# Patient Record
Sex: FEMALE | Race: WHITE | NOT HISPANIC OR LATINO
[De-identification: names, ages, dates, MRNs, and addresses within clinical notes are randomized per-mention and may not be internally consistent; named-entity substitution may affect disease eponyms.]

---

## 2017-11-28 ENCOUNTER — APPOINTMENT (OUTPATIENT)
Dept: VASCULAR SURGERY | Facility: CLINIC | Age: 82
End: 2017-11-28
Payer: MEDICARE

## 2017-11-28 PROCEDURE — 99213 OFFICE O/P EST LOW 20 MIN: CPT

## 2018-02-06 ENCOUNTER — APPOINTMENT (OUTPATIENT)
Dept: ORTHOPEDIC SURGERY | Facility: CLINIC | Age: 83
End: 2018-02-06
Payer: MEDICARE

## 2018-02-06 ENCOUNTER — CHART COPY (OUTPATIENT)
Age: 83
End: 2018-02-06

## 2018-02-06 VITALS
DIASTOLIC BLOOD PRESSURE: 68 MMHG | HEIGHT: 58 IN | BODY MASS INDEX: 25.19 KG/M2 | WEIGHT: 120 LBS | HEART RATE: 94 BPM | SYSTOLIC BLOOD PRESSURE: 158 MMHG

## 2018-02-06 DIAGNOSIS — H35.30 UNSPECIFIED MACULAR DEGENERATION: ICD-10-CM

## 2018-02-06 PROCEDURE — 73552 X-RAY EXAM OF FEMUR 2/>: CPT | Mod: RT

## 2018-02-06 PROCEDURE — 99204 OFFICE O/P NEW MOD 45 MIN: CPT

## 2018-02-06 PROCEDURE — 72100 X-RAY EXAM L-S SPINE 2/3 VWS: CPT

## 2018-02-06 PROCEDURE — 72170 X-RAY EXAM OF PELVIS: CPT

## 2018-03-06 ENCOUNTER — APPOINTMENT (OUTPATIENT)
Dept: ORTHOPEDIC SURGERY | Facility: CLINIC | Age: 83
End: 2018-03-06
Payer: MEDICARE

## 2018-03-06 PROCEDURE — 99214 OFFICE O/P EST MOD 30 MIN: CPT

## 2018-04-17 ENCOUNTER — APPOINTMENT (OUTPATIENT)
Dept: ORTHOPEDIC SURGERY | Facility: CLINIC | Age: 83
End: 2018-04-17
Payer: MEDICARE

## 2018-04-17 PROCEDURE — 99214 OFFICE O/P EST MOD 30 MIN: CPT

## 2018-05-15 ENCOUNTER — APPOINTMENT (OUTPATIENT)
Dept: ORTHOPEDIC SURGERY | Facility: CLINIC | Age: 83
End: 2018-05-15
Payer: MEDICARE

## 2018-05-15 DIAGNOSIS — M47.816 SPONDYLOSIS W/OUT MYELOPATHY OR RADICULOPATHY, LUMBAR REGION: ICD-10-CM

## 2018-05-15 DIAGNOSIS — M54.16 RADICULOPATHY, LUMBAR REGION: ICD-10-CM

## 2018-05-15 PROCEDURE — 99214 OFFICE O/P EST MOD 30 MIN: CPT

## 2018-06-29 ENCOUNTER — APPOINTMENT (OUTPATIENT)
Dept: OTOLARYNGOLOGY | Facility: CLINIC | Age: 83
End: 2018-06-29
Payer: MEDICARE

## 2018-06-29 VITALS
HEART RATE: 77 BPM | HEIGHT: 58 IN | SYSTOLIC BLOOD PRESSURE: 177 MMHG | WEIGHT: 120 LBS | BODY MASS INDEX: 25.19 KG/M2 | DIASTOLIC BLOOD PRESSURE: 72 MMHG

## 2018-06-29 DIAGNOSIS — F17.200 NICOTINE DEPENDENCE, UNSPECIFIED, UNCOMPLICATED: ICD-10-CM

## 2018-06-29 DIAGNOSIS — Z60.2 PROBLEMS RELATED TO LIVING ALONE: ICD-10-CM

## 2018-06-29 DIAGNOSIS — Z78.9 OTHER SPECIFIED HEALTH STATUS: ICD-10-CM

## 2018-06-29 DIAGNOSIS — M26.69 OTHER SPECIFIED DISORDERS OF TEMPOROMANDIBULAR JOINT: ICD-10-CM

## 2018-06-29 PROCEDURE — 92567 TYMPANOMETRY: CPT

## 2018-06-29 PROCEDURE — 92557 COMPREHENSIVE HEARING TEST: CPT

## 2018-06-29 PROCEDURE — 99214 OFFICE O/P EST MOD 30 MIN: CPT | Mod: 25

## 2018-06-29 PROCEDURE — 69210 REMOVE IMPACTED EAR WAX UNI: CPT

## 2018-06-29 SDOH — SOCIAL STABILITY - SOCIAL INSECURITY: PROBLEMS RELATED TO LIVING ALONE: Z60.2

## 2019-01-29 ENCOUNTER — APPOINTMENT (OUTPATIENT)
Dept: ORTHOPEDIC SURGERY | Facility: CLINIC | Age: 84
End: 2019-01-29
Payer: MEDICARE

## 2019-01-29 VITALS — HEART RATE: 85 BPM | DIASTOLIC BLOOD PRESSURE: 73 MMHG | SYSTOLIC BLOOD PRESSURE: 179 MMHG

## 2019-01-29 DIAGNOSIS — M79.604 PAIN IN RIGHT LEG: ICD-10-CM

## 2019-01-29 PROCEDURE — 99214 OFFICE O/P EST MOD 30 MIN: CPT

## 2019-01-29 NOTE — DISCUSSION/SUMMARY
[Medication Risks Reviewed] : Medication risks reviewed [de-identified] : She will try taking 2 arthritis strength Tylenol at bedtime. I do not think these symptoms necessarily are coming from her spine. She also complains about peripheral neuropathy. I will see her for followup on a p.r.n. basis.

## 2019-01-29 NOTE — HISTORY OF PRESENT ILLNESS
[de-identified] : She again returned to the right lateral thigh pain. She has had a previous operation for an intertrochanteric hip fracture. X-rays have revealed a fracture be well healed. There is tenderness in the mid thigh it is nowhere near the trochanteric fixation. Straight leg raising is negative to 90°. I had treated her with anti-inflammatory medications that she could not tolerate. She recently had relief with some Advil gel pads but she is on Plavix and her internist does not want her to take that. She had a trial with Celebrex in the past and felt a Tylenol work just as well. Symptoms seem to be more during the night and in the day.

## 2019-01-29 NOTE — REASON FOR VISIT
[Follow-Up Visit] : a follow-up visit for [Other: _____] : [unfilled] [FreeTextEntry2] : Right lateral thigh pain

## 2019-03-19 ENCOUNTER — APPOINTMENT (OUTPATIENT)
Dept: OTOLARYNGOLOGY | Facility: CLINIC | Age: 84
End: 2019-03-19
Payer: MEDICARE

## 2019-03-19 VITALS
SYSTOLIC BLOOD PRESSURE: 179 MMHG | HEART RATE: 73 BPM | DIASTOLIC BLOOD PRESSURE: 68 MMHG | HEIGHT: 58 IN | WEIGHT: 115 LBS | BODY MASS INDEX: 24.14 KG/M2

## 2019-03-19 DIAGNOSIS — H61.23 IMPACTED CERUMEN, BILATERAL: ICD-10-CM

## 2019-03-19 DIAGNOSIS — H90.3 SENSORINEURAL HEARING LOSS, BILATERAL: ICD-10-CM

## 2019-03-19 PROCEDURE — 99214 OFFICE O/P EST MOD 30 MIN: CPT

## 2019-03-19 NOTE — ASSESSMENT
[FreeTextEntry1] : Patient with the diminished hearing hearing is are working very well on examination she is MiraLax no infection she was reassured no other options other than adjusting her hearing aids are available.

## 2019-03-19 NOTE — HISTORY OF PRESENT ILLNESS
[de-identified] : Patient states that she had her ears cleaned by the audiologist and states that she still cannot hear anything when she has had hearing aids out. SHe does not hve any pain in the ears right now or pressure and she is not having any dizziness.

## 2019-08-16 ENCOUNTER — APPOINTMENT (OUTPATIENT)
Dept: SURGICAL ONCOLOGY | Facility: CLINIC | Age: 84
End: 2019-08-16
Payer: MEDICARE

## 2019-08-16 VITALS
TEMPERATURE: 97.7 F | HEIGHT: 58 IN | DIASTOLIC BLOOD PRESSURE: 75 MMHG | OXYGEN SATURATION: 96 % | SYSTOLIC BLOOD PRESSURE: 156 MMHG | RESPIRATION RATE: 18 BRPM | HEART RATE: 81 BPM | BODY MASS INDEX: 24.14 KG/M2 | WEIGHT: 115 LBS

## 2019-08-16 DIAGNOSIS — L85.9 EPIDERMAL THICKENING, UNSPECIFIED: ICD-10-CM

## 2019-08-16 PROCEDURE — 99203 OFFICE O/P NEW LOW 30 MIN: CPT

## 2019-08-16 NOTE — HISTORY OF PRESENT ILLNESS
[de-identified] : \par 89 y/o female patient presents for an initial consultation. Pt states that she has two black spots in between her breasts. \par Pt has macular degeneration. \par \par \par Allergies: Shellfish ,Levaquin, Latex, Neosporin, Morphine, Iodine, Florescence. \par

## 2019-08-16 NOTE — CONSULT LETTER
[Dear  ___] : Dear  [unfilled], [Consult Letter:] : I had the pleasure of evaluating your patient, [unfilled]. [Consult Closing:] : Thank you very much for allowing me to participate in the care of this patient.  If you have any questions, please do not hesitate to contact me. [Please see my note below.] : Please see my note below. [Sincerely,] : Sincerely, [FreeTextEntry2] : [address] [address] [FreeTextEntry3] : Steve Atwood M.D.\par \par \par \par

## 2019-08-16 NOTE — ASSESSMENT
[FreeTextEntry1] : Imp:\par Hyperkeratosis bilateral chest walls\par No suspicious lesions\par \par Plan:\par Referred back to dermatology.

## 2019-08-16 NOTE — PHYSICAL EXAM
[Normal] : supple, no neck mass and thyroid not enlarged [Normal Neck Lymph Nodes] : normal neck lymph nodes  [Normal Supraclavicular Lymph Nodes] : normal supraclavicular lymph nodes [Normal Groin Lymph Nodes] : normal groin lymph nodes [Normal Axillary Lymph Nodes] : normal axillary lymph nodes [Normal] : oriented to person, place and time, with appropriate affect [FreeTextEntry1] : I, Jose Davila was present for the physical exam\par \par \par \par  [de-identified] : Normal S1, S2. Regular rate and rhythm\par \par  [de-identified] : Hyperkeratosis on the inner and lower outer aspect of of RT breast and inner aspect of LT breast.  [de-identified] : Clear breath sounds bilaterally, normal respiratory effort\par \par

## 2019-10-02 PROBLEM — Z60.2 PERSON LIVING ALONE: Status: ACTIVE | Noted: 2018-02-06

## 2020-03-28 ENCOUNTER — EMERGENCY (EMERGENCY)
Facility: HOSPITAL | Age: 85
LOS: 1 days | Discharge: HOME | End: 2020-03-28
Attending: EMERGENCY MEDICINE
Payer: MEDICARE

## 2020-03-28 VITALS
OXYGEN SATURATION: 97 % | HEIGHT: 62 IN | HEART RATE: 96 BPM | TEMPERATURE: 99 F | DIASTOLIC BLOOD PRESSURE: 83 MMHG | RESPIRATION RATE: 20 BRPM | SYSTOLIC BLOOD PRESSURE: 167 MMHG | WEIGHT: 104.94 LBS

## 2020-03-28 VITALS
TEMPERATURE: 98 F | DIASTOLIC BLOOD PRESSURE: 72 MMHG | SYSTOLIC BLOOD PRESSURE: 179 MMHG | RESPIRATION RATE: 18 BRPM | OXYGEN SATURATION: 98 % | HEART RATE: 98 BPM

## 2020-03-28 PROCEDURE — 73551 X-RAY EXAM OF FEMUR 1: CPT | Mod: 26,LT

## 2020-03-28 PROCEDURE — 72125 CT NECK SPINE W/O DYE: CPT | Mod: 26

## 2020-03-28 PROCEDURE — 71045 X-RAY EXAM CHEST 1 VIEW: CPT

## 2020-03-28 PROCEDURE — 70450 CT HEAD/BRAIN W/O DYE: CPT

## 2020-03-28 PROCEDURE — 72125 CT NECK SPINE W/O DYE: CPT

## 2020-03-28 PROCEDURE — 70450 CT HEAD/BRAIN W/O DYE: CPT | Mod: 26

## 2020-03-28 PROCEDURE — 99284 EMERGENCY DEPT VISIT MOD MDM: CPT

## 2020-03-28 PROCEDURE — 73551 X-RAY EXAM OF FEMUR 1: CPT

## 2020-03-28 PROCEDURE — 71045 X-RAY EXAM CHEST 1 VIEW: CPT | Mod: 26

## 2020-03-28 PROCEDURE — 73502 X-RAY EXAM HIP UNI 2-3 VIEWS: CPT

## 2020-03-28 PROCEDURE — 73502 X-RAY EXAM HIP UNI 2-3 VIEWS: CPT | Mod: 26,LT

## 2020-03-28 RX ORDER — ACETAMINOPHEN 500 MG
650 TABLET ORAL ONCE
Refills: 0 | Status: COMPLETED | OUTPATIENT
Start: 2020-03-28 | End: 2020-03-28

## 2020-03-28 RX ADMIN — Medication 650 MILLIGRAM(S): at 14:22

## 2020-03-28 RX ADMIN — Medication 650 MILLIGRAM(S): at 15:00

## 2020-03-28 NOTE — ED PROVIDER NOTE - NSFOLLOWUPINSTRUCTIONS_ED_ALL_ED_FT
1. Follow up with your primary care doctor in the next 1-2 days   2. Rest and increase fluids. Apply ice to areas of pain in 20 minute intervals several times a day  3. For pain take Tylenol 1 g every 6 hours  4. Return to ED for change of symptoms including increased pain, recurrent falls, dizziness, weakness and any concerns

## 2020-03-28 NOTE — ED ADULT NURSE NOTE - NSIMPLEMENTINTERV_GEN_ALL_ED
Implemented All Fall with Harm Risk Interventions:  [place] to call system. Call bell, personal items and telephone within reach. Instruct patient to call for assistance. Room bathroom lighting operational. Non-slip footwear when patient is off stretcher. Physically safe environment: no spills, clutter or unnecessary equipment. Stretcher in lowest position, wheels locked, appropriate side rails in place. Provide visual cue, wrist band, yellow gown, etc. Monitor gait and stability. Monitor for mental status changes and reorient to person, place, and time. Review medications for side effects contributing to fall risk. Reinforce activity limits and safety measures with patient and family. Provide visual clues: red socks.

## 2020-03-28 NOTE — ED PROVIDER NOTE - PHYSICAL EXAMINATION
CONSTITUTIONAL: Patient is awake, alert and oriented x 3. Patient is well appearing and in no acute distress.  HEAD: 2 golfball size hematomas to left head, 1st for left forehead above eyebrow w/ overlying ecchymosis w/ second superior to left ear,   EYES: PERRL b/l, EOMI,   LUNGS: CTA B/L,  HEART: RRR.+S1S2 no murmurs,   ABDOMEN: Soft nd/nt+bs no rebound or guarding.   EXTREMITY: no edema or calf tenderness b/l, FROM upper and lower ext b/l. No midline cervical, thoracic or lumbar ttp. Pelvis stable. TTP left proximal femur w/out obvious deformity. TTP left elbow w/out deformity   NEURO: No focal deficits

## 2020-03-28 NOTE — ED PROVIDER NOTE - PATIENT PORTAL LINK FT
You can access the FollowMyHealth Patient Portal offered by [facility] by registering at the following website: [email or web address]. By joining [employer]’s FollowMyHealth portal, you will also be able to view your health information using other applications (apps) compatible with our system.

## 2020-03-28 NOTE — ED PROVIDER NOTE - OBJECTIVE STATEMENT
89 year old female with pmhx GERD, arthritis, seizure disorder, macular degeneration, hear of hearing presents to ED c/o left hip and elbow pain and left sided head pain s/p fall. Pt reports she was on the phone with her son, was walking and attempted to fix something on her TV she turned to fast and lost her balance falling on her left side. She denies syncope or loc. Reports bump to left side head as well as pain in the left elbow and left upper leg. Denies HA, dizziness, fevers, chills, cough, sick contacts, chest pain, sob, abd pain, n/v/d

## 2020-03-28 NOTE — ED ADULT NURSE NOTE - OBJECTIVE STATEMENT
1115 89 yr old WF brought to ER via ambulance on stretcher from nursing home for further eval and tx of HA. "Was adjusting television that was not working and she fell. ". No LOC. Ecchymosis, swelling noted at center of forehead. fall risk precautions maintained. Denies dizziness, palp, chest pain, fever or chills. A&Ox4.

## 2020-03-28 NOTE — ED PROVIDER NOTE - PROGRESS NOTE DETAILS
Pt imaging unremarkable. Ambulated in ED w/ assistance (normally walks w/ cane). Discussed w/ son who will  patient now   Mayda Harrell PA-C

## 2020-03-28 NOTE — ED PROVIDER NOTE - PMH
Arthritis    GERD (gastroesophageal reflux disease)    Hard of hearing, bilateral    Macular degeneration    Seizure disorder    Visual impairment

## 2020-03-28 NOTE — ED PROVIDER NOTE - CLINICAL SUMMARY MEDICAL DECISION MAKING FREE TEXT BOX
89 year old female with pmhx GERD, arthritis, seizure disorder, macular degeneration, hear of hearing presents to ED c/o left hip and elbow pain and left sided head pain s/p mechanical fall. + head injury, denies loc. Pt takes Plavix. Will obtain CT head and cervical pain, xray chest, left hip and left elbow. Provide tylenol for pain and reassess -ZR 89 year old female with pmhx GERD, arthritis, seizure disorder, macular degeneration, hear of hearing presents to ED c/o left hip and elbow pain and left sided head pain s/p mechanical fall. + head injury, denies loc. Pt takes Plavix. Will obtain CT head and cervical pain, xray chest, left hip. Provide tylenol for pain and reassess -ZR 89 year old female with pmhx GERD, arthritis, seizure disorder, macular degeneration, hear of hearing presents to ED c/o left hip and elbow pain and left sided head pain s/p mechanical fall. + head injury, denies loc. Pt takes Plavix. Will obtain CT head and cervical spine, xray chest, left hip. Provide tylenol for pain and reassess -ZR

## 2021-12-12 ENCOUNTER — EMERGENCY (EMERGENCY)
Facility: HOSPITAL | Age: 86
LOS: 1 days | Discharge: HOME | End: 2021-12-12
Attending: EMERGENCY MEDICINE | Admitting: HOSPITALIST
Payer: MEDICARE

## 2021-12-12 VITALS
TEMPERATURE: 98 F | HEIGHT: 62 IN | SYSTOLIC BLOOD PRESSURE: 179 MMHG | DIASTOLIC BLOOD PRESSURE: 75 MMHG | WEIGHT: 130.07 LBS | HEART RATE: 87 BPM | OXYGEN SATURATION: 95 % | RESPIRATION RATE: 16 BRPM

## 2021-12-12 DIAGNOSIS — Z96.642 PRESENCE OF LEFT ARTIFICIAL HIP JOINT: Chronic | ICD-10-CM

## 2021-12-12 DIAGNOSIS — S32.000A WEDGE COMPRESSION FRACTURE OF UNSPECIFIED LUMBAR VERTEBRA, INITIAL ENCOUNTER FOR CLOSED FRACTURE: ICD-10-CM

## 2021-12-12 DIAGNOSIS — I10 ESSENTIAL (PRIMARY) HYPERTENSION: ICD-10-CM

## 2021-12-12 DIAGNOSIS — S39.012A STRAIN OF MUSCLE, FASCIA AND TENDON OF LOWER BACK, INITIAL ENCOUNTER: ICD-10-CM
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
## 2021-12-12 DIAGNOSIS — D32.9 BENIGN NEOPLASM OF MENINGES, UNSPECIFIED: ICD-10-CM

## 2021-12-12 LAB
ALBUMIN SERPL ELPH-MCNC: 3.8 G/DL — SIGNIFICANT CHANGE UP (ref 3.3–5)
ALP SERPL-CCNC: 123 U/L — HIGH (ref 40–120)
ALT FLD-CCNC: 8 U/L — LOW (ref 10–45)
ANION GAP SERPL CALC-SCNC: 13 MMOL/L — SIGNIFICANT CHANGE UP (ref 5–17)
APPEARANCE UR: CLEAR — SIGNIFICANT CHANGE UP
AST SERPL-CCNC: 13 U/L — SIGNIFICANT CHANGE UP (ref 10–40)
BACTERIA # UR AUTO: ABNORMAL
BASOPHILS # BLD AUTO: 0.02 K/UL — SIGNIFICANT CHANGE UP (ref 0–0.2)
BASOPHILS NFR BLD AUTO: 0.2 % — SIGNIFICANT CHANGE UP (ref 0–2)
BILIRUB SERPL-MCNC: 0.7 MG/DL — SIGNIFICANT CHANGE UP (ref 0.2–1.2)
BILIRUB UR-MCNC: NEGATIVE — SIGNIFICANT CHANGE UP
BUN SERPL-MCNC: 25 MG/DL — HIGH (ref 7–23)
CALCIUM SERPL-MCNC: 9 MG/DL — SIGNIFICANT CHANGE UP (ref 8.4–10.5)
CHLORIDE SERPL-SCNC: 100 MMOL/L — SIGNIFICANT CHANGE UP (ref 96–108)
CO2 SERPL-SCNC: 23 MMOL/L — SIGNIFICANT CHANGE UP (ref 22–31)
COLOR SPEC: SIGNIFICANT CHANGE UP
CREAT SERPL-MCNC: 0.69 MG/DL — SIGNIFICANT CHANGE UP (ref 0.5–1.3)
DIFF PNL FLD: ABNORMAL
EOSINOPHIL # BLD AUTO: 0.09 K/UL — SIGNIFICANT CHANGE UP (ref 0–0.5)
EOSINOPHIL NFR BLD AUTO: 0.8 % — SIGNIFICANT CHANGE UP (ref 0–6)
EPI CELLS # UR: 2 /HPF — SIGNIFICANT CHANGE UP
GLUCOSE SERPL-MCNC: 111 MG/DL — HIGH (ref 70–99)
GLUCOSE UR QL: NEGATIVE — SIGNIFICANT CHANGE UP
HCT VFR BLD CALC: 33.4 % — LOW (ref 34.5–45)
HGB BLD-MCNC: 10.9 G/DL — LOW (ref 11.5–15.5)
HYALINE CASTS # UR AUTO: 1 /LPF — SIGNIFICANT CHANGE UP (ref 0–7)
IMM GRANULOCYTES NFR BLD AUTO: 0.5 % — SIGNIFICANT CHANGE UP (ref 0–1.5)
KETONES UR-MCNC: NEGATIVE — SIGNIFICANT CHANGE UP
LEUKOCYTE ESTERASE UR-ACNC: ABNORMAL
LYMPHOCYTES # BLD AUTO: 1.02 K/UL — SIGNIFICANT CHANGE UP (ref 1–3.3)
LYMPHOCYTES # BLD AUTO: 9.5 % — LOW (ref 13–44)
MCHC RBC-ENTMCNC: 28.3 PG — SIGNIFICANT CHANGE UP (ref 27–34)
MCHC RBC-ENTMCNC: 32.6 GM/DL — SIGNIFICANT CHANGE UP (ref 32–36)
MCV RBC AUTO: 86.8 FL — SIGNIFICANT CHANGE UP (ref 80–100)
MONOCYTES # BLD AUTO: 0.55 K/UL — SIGNIFICANT CHANGE UP (ref 0–0.9)
MONOCYTES NFR BLD AUTO: 5.1 % — SIGNIFICANT CHANGE UP (ref 2–14)
NEUTROPHILS # BLD AUTO: 8.99 K/UL — HIGH (ref 1.8–7.4)
NEUTROPHILS NFR BLD AUTO: 83.9 % — HIGH (ref 43–77)
NITRITE UR-MCNC: NEGATIVE — SIGNIFICANT CHANGE UP
NRBC # BLD: 0 /100 WBCS — SIGNIFICANT CHANGE UP (ref 0–0)
PH UR: 6 — SIGNIFICANT CHANGE UP (ref 5–8)
PLATELET # BLD AUTO: 172 K/UL — SIGNIFICANT CHANGE UP (ref 150–400)
POTASSIUM SERPL-MCNC: 4.1 MMOL/L — SIGNIFICANT CHANGE UP (ref 3.5–5.3)
POTASSIUM SERPL-SCNC: 4.1 MMOL/L — SIGNIFICANT CHANGE UP (ref 3.5–5.3)
PROT SERPL-MCNC: 7.1 G/DL — SIGNIFICANT CHANGE UP (ref 6–8.3)
PROT UR-MCNC: ABNORMAL
RBC # BLD: 3.85 M/UL — SIGNIFICANT CHANGE UP (ref 3.8–5.2)
RBC # FLD: 14 % — SIGNIFICANT CHANGE UP (ref 10.3–14.5)
RBC CASTS # UR COMP ASSIST: 5 /HPF — HIGH (ref 0–4)
SARS-COV-2 RNA SPEC QL NAA+PROBE: SIGNIFICANT CHANGE UP
SODIUM SERPL-SCNC: 136 MMOL/L — SIGNIFICANT CHANGE UP (ref 135–145)
SP GR SPEC: 1.02 — SIGNIFICANT CHANGE UP (ref 1.01–1.02)
UROBILINOGEN FLD QL: NEGATIVE — SIGNIFICANT CHANGE UP
WBC # BLD: 10.72 K/UL — HIGH (ref 3.8–10.5)
WBC # FLD AUTO: 10.72 K/UL — HIGH (ref 3.8–10.5)
WBC UR QL: 3 /HPF — SIGNIFICANT CHANGE UP (ref 0–5)

## 2021-12-12 PROCEDURE — 93010 ELECTROCARDIOGRAM REPORT: CPT

## 2021-12-12 PROCEDURE — 99285 EMERGENCY DEPT VISIT HI MDM: CPT | Mod: 25

## 2021-12-12 RX ORDER — LIDOCAINE 4 G/100G
1 CREAM TOPICAL DAILY
Refills: 0 | Status: DISCONTINUED | OUTPATIENT
Start: 2021-12-12 | End: 2021-12-13

## 2021-12-12 RX ORDER — CHOLECALCIFEROL (VITAMIN D3) 125 MCG
1000 CAPSULE ORAL DAILY
Refills: 0 | Status: DISCONTINUED | OUTPATIENT
Start: 2021-12-12 | End: 2021-12-13

## 2021-12-12 RX ORDER — LIDOCAINE 4 G/100G
1 CREAM TOPICAL ONCE
Refills: 0 | Status: COMPLETED | OUTPATIENT
Start: 2021-12-12 | End: 2021-12-12

## 2021-12-12 RX ORDER — CLOPIDOGREL BISULFATE 75 MG/1
75 TABLET, FILM COATED ORAL DAILY
Refills: 0 | Status: DISCONTINUED | OUTPATIENT
Start: 2021-12-12 | End: 2021-12-13

## 2021-12-12 RX ORDER — ACETAMINOPHEN 500 MG
650 TABLET ORAL EVERY 6 HOURS
Refills: 0 | Status: DISCONTINUED | OUTPATIENT
Start: 2021-12-12 | End: 2021-12-13

## 2021-12-12 RX ORDER — ENOXAPARIN SODIUM 100 MG/ML
30 INJECTION SUBCUTANEOUS DAILY
Refills: 0 | Status: DISCONTINUED | OUTPATIENT
Start: 2021-12-12 | End: 2021-12-13

## 2021-12-12 RX ORDER — ACETAMINOPHEN 500 MG
650 TABLET ORAL ONCE
Refills: 0 | Status: COMPLETED | OUTPATIENT
Start: 2021-12-12 | End: 2021-12-12

## 2021-12-12 RX ADMIN — LIDOCAINE 1 PATCH: 4 CREAM TOPICAL at 10:54

## 2021-12-12 RX ADMIN — Medication 650 MILLIGRAM(S): at 10:52

## 2021-12-12 RX ADMIN — Medication 650 MILLIGRAM(S): at 16:31

## 2021-12-12 RX ADMIN — LIDOCAINE 1 PATCH: 4 CREAM TOPICAL at 22:24

## 2021-12-12 NOTE — ED PROVIDER NOTE - ATTENDING CONTRIBUTION TO CARE
------------ATTENDING NOTE------------  pt w/ son brought to ED by EMS c/o recent falls, describes this AM falling while trying to sit in chair, describes sliding back and hit back on ground, no head injury or LOC, pt was on ground for less than an hour waiting for her son to come (she called from ground), complicated as recent falls in past weeks and has likely a R sciatica that was bothering her in bed this AM, today c/o diffuse lower mid back pain w/ ROM, she is mentating at her baseline, no additional complaints, at baseline ambulates w/ walker or cane, overall very well appearing, awaiting imaging and close reassessments --> failed safe PT, needing admission for [facility] medical [facility], outpt needs assessments.  - Feng Gibbs MD   ---------------------------------------------

## 2021-12-12 NOTE — PHYSICAL THERAPY INITIAL EVALUATION ADULT - PRECAUTIONS/LIMITATIONS, REHAB EVAL
Only down for 20-30 minutes, at maximum. Pain is dull and increased with rotation. No cp, pain with deep breathing, neck pain or numbness/tingling. +L hip pain s/p hip replacement 7y ago, recently seen by Marcin and ray with soft tissue injury; no change in the pain after the fall. Only down for 20-30 minutes, at maximum. Pain is dull and increased with rotation. No cp, pain with deep breathing, neck pain or numbness/tingling. +L hip pain s/p hip replacement 7y ago, recently seen by Marcin and ray with soft tissue injury; no change in the pain after the fall. CTT/LSpine: No acute findings.Multiple chronic compression deformities of the T12, L1, L4, and L5  vertebral bodies which appear unchanged. No bony retropulsion. Diffuse osteopenia and multilevel thoracolumbar spondylosis.  Cervical spine CT: No acute fractures or dislocations.Similar-appearing multilevel cervical stenosis.Head CT: No acute intracranial findings.Similar-appearing left lateral frontal convexity partially calcified meningioma without associated mass effect or surrounding edema.Unchanged imaging findings for which normal pressure hydrocephalus can be  considered. Clinical correlation is required for this diagnosis. XRayPelvis: (-) XRayRHip: (-) XRayLTib/Fib: (-)/fall precautions

## 2021-12-12 NOTE — H&P ADULT - PROBLEM SELECTOR PLAN 1
-PT consulted and recommended for YOON and patient okay with it  -Fall risk protocol, ambulate w/ assistance

## 2021-12-12 NOTE — ED PROVIDER NOTE - OBJECTIVE STATEMENT
pt typically ambulatory at home with a walker presents after sliding off her assistive recliner and onto the floor this am, landing on her buttocks. No head trauma LOC or prodromal cp/dizziness/sob. She was in the recliner secondary to acute pain in her midback overnight. She could not find a comfortable position, so she slept the rest of the night in the recliner, and when attempting to get up in the morning before her aid arrived, slipped off the assistive recliner. Only down for 20-30 minutes, at maximum. Pain is dull and increased with rotation. No cp, pain with deep breathing, neck pain or numbness/tingling. +L hip pain s/p hip replacement 7y ago, recently seen by Marcin and dx with soft tissue injury; no change in the pain after the fall.

## 2021-12-12 NOTE — H&P ADULT - HISTORY OF PRESENT ILLNESS
89F with PMHx of macular degeneration (appears to be legally blind), severe presbycusis, and hypertension presents after a fall at home. Patient was attempting to sit on her assistive recliner when she missed it and slid onto the floor. Afterwards had worsening of her chronic back pain, bilateral knee and hip pain and was in the recliner overnight. Patient states she has trouble walking with pain in lower lumbar described as 10/10 and worsened with movement. She states she typically takes Tylenol which relieves the pain. In the ED patient essentially pan-scanned with no acute fractures, chronic of T12, L1, L4 and L5, cervical stenosis and unchanged meningioma with no midline shift.    Patient seen in the ED after working with PT and was recommended for YOON. Patient states she is turning 89 next month. She states she only takes Plavix. She states she is blind and deaf. Currently complaining of back pain, no endorsement of other alarm symptoms.

## 2021-12-12 NOTE — ED PROVIDER NOTE - CLINICAL SUMMARY MEDICAL DECISION MAKING FREE TEXT BOX
------------ATTENDING NOTE------------  pt w/ son brought to ED by EMS c/o recent falls, describes this AM falling while trying to sit in chair, describes sliding back and hit back on ground, no head injury or LOC, pt was on ground for less than an hour waiting for her son to come (she called from ground), complicated as recent falls in past weeks and has likely a R sciatica that was bothering her in bed this AM, today c/o diffuse lower mid back pain w/ ROM, she is mentating at her baseline, no additional complaints, at baseline ambulates w/ walker or cane, overall very well appearing, awaiting imaging and close reassessments -->   - Feng Gibbs MD   --------------------------------------------- see MD Note

## 2021-12-12 NOTE — ED PROVIDER NOTE - CARE PLAN
1 Principal Discharge DX:	Strain, back, initial encounter  Secondary Diagnosis:	Contusion of lower back, initial encounter

## 2021-12-12 NOTE — ED ADULT NURSE NOTE - NSIMPLEMENTINTERV_GEN_ALL_ED
Implemented All Fall with Harm Risk Interventions:  [place] to call system. Call bell, personal items and telephone within reach. Instruct patient to call for assistance. Room bathroom lighting operational. Non-slip footwear when patient is off stretcher. Physically safe environment: no spills, clutter or unnecessary equipment. Stretcher in lowest position, wheels locked, appropriate side rails in place. Provide visual cue, wrist band, yellow gown, etc. Monitor gait and stability. Monitor for mental status changes and reorient to person, place, and time. Review medications for side effects contributing to fall risk. Reinforce activity limits and safety measures with patient and family. Provide visual clues: red socks.

## 2021-12-12 NOTE — ED PROVIDER NOTE - PROGRESS NOTE DETAILS
imaging negative for acute traumatic injury, however PT has evaluated pt and deemed her unsafe to be dc'd home. sending labs for admission. hospitalist paged. PCP is Dakota Villalobos, cardiologist

## 2021-12-12 NOTE — H&P ADULT - PROBLEM SELECTOR PLAN 4
Unchanged from prior scans with no mass effect/midline shift. Patient can continue with monitor as outpatient with imaging, but based on growth trajectory and her age this is unlikely to cause her any issues.

## 2021-12-12 NOTE — H&P ADULT - PROBLEM SELECTOR PLAN 2
-Obtain TLSO brace  -Monitor for neurological alarm symptoms  -Tylenol PRN  -Lidocaine patch  -Supplement with Vitamin D and Calcium

## 2021-12-12 NOTE — ED PROVIDER NOTE - PHYSICAL EXAMINATION
General: non-toxic, NAD  HEENT: NCAT,   Cardiac: RRR, no murmurs, 2+ peripheral pulses  Resp: CTAB  Abdomen: soft, non-distended, bowel sounds present, no ttp, no rebound or guarding. no organomegaly  MSK: no midline spinal tenderness. no deformity. +tibial tenderness pelvis stable.  Skin: no rashes or ecchymosis  Neuro: AAOx4, 5+motor, sensation grossly intact  Psych: mood and affect appropriate

## 2021-12-12 NOTE — H&P ADULT - NSHPPHYSICALEXAM_GEN_ALL_CORE
T(C): 36.7 (12-12-21 @ 15:20), Max: 36.8 (12-12-21 @ 10:29)  HR: 78 (12-12-21 @ 15:20) (78 - 88)  BP: 154/69 (12-12-21 @ 15:20) (154/69 - 188/68)  RR: 18 (12-12-21 @ 10:44) (16 - 18)  SpO2: 95% (12-12-21 @ 15:20) (94% - 95%)    GEN: female in NAD, appears comfortable, no diaphoresis  EYES: No scleral injection, PERRL, EOMI  ENTM: neck supple & symmetric without tracheal deviation, moist membranes, no gross hearing impairment, thyroid gland not enlarged  CV: +S1/S2, no m/r/g, no abdominal bruit, no LE edema  RESP: breathing comfortably, no respiratory accessory muscle use, CTAB, no w/r/r  GI: normoactive BS, soft, NTND, no rebounding/guarding, no palpable masses

## 2021-12-12 NOTE — H&P ADULT - NSICDXPASTMEDICALHX_GEN_ALL_CORE_FT
PAST MEDICAL HISTORY:  Arthritis     GERD (gastroesophageal reflux disease)     Hard of hearing, bilateral     Macular degeneration     Visual impairment

## 2021-12-12 NOTE — PHYSICAL THERAPY INITIAL EVALUATION ADULT - PERTINENT HX OF CURRENT PROBLEM, REHAB EVAL
89y/o F complaining of back pain/injury. Pt typically ambulatory at home with a walker presents after sliding off her assistive recliner and onto the floor this am, landing on her buttocks.  She was in the recliner secondary to acute pain in her midback overnight. She could not find a comfortable position, so she slept the rest of the night in the recliner, and when attempting to get up in the morning before her aid arrived, slipped off the assistive recliner.

## 2021-12-12 NOTE — ED PROVIDER NOTE - NSICDXPASTMEDICALHX_GEN_ALL_CORE_FT
PAST MEDICAL HISTORY:  Arthritis     GERD (gastroesophageal reflux disease)     Hard of hearing, bilateral     Macular degeneration     Seizure disorder     Visual impairment

## 2021-12-12 NOTE — ED PROVIDER NOTE - NS ED ROS FT
Constitutional: no fevers, chills  HEENT: no HA, vision changes, rhinorrhea, sore throat  Cardiac: no chest pain, palpitations  Respiratory: no SOB, cough or hemoptysis  GI: no n/v/d/c, abd pain, bloody or dark stools  : +dysuria, no frequency, or hematuria  MSK: see HPI  Skin: no rashes, jaundice, pruritis  Neuro: no numbness/tingling, weakness, unsteady gait  ROS otherwise negative except as per HPI

## 2021-12-12 NOTE — H&P ADULT - ASSESSMENT
89F with PMHx of macular degeneration (appears to be legally blind), severe presbycusis, and hypertension presents after a fall at home after she missed her recliner and slid onto the floor onto her buttocks. In the ED patient essentially pan-scanned with no acute fractures, chronic of T12, L1, L4 and L5, cervical stenosis and unchanged meningioma with no midline shift.

## 2021-12-12 NOTE — ED ADULT NURSE NOTE - OBJECTIVE STATEMENT
89y f pt arrived via ems; emt reports son called ems; pt had gone to sit in chair, missed and slid to floor on back; pt aox3; [place]; pt denies head injury; denies LOC; pt denies dizziness/syncope; pt states just slid down; was unable to get up on own; called son; was on floor for less than an hour; pt c/o pain to right lower back; right hip; down right leg; no shortening of leg; no visible injury; pt able to move all toes; + periph pulses; pt medicated; son at bedside; safety/comfort maintained

## 2021-12-12 NOTE — PHYSICAL THERAPY INITIAL EVALUATION ADULT - GAIT TRAINING, PT EVAL
Needs an appointment. Only refilled for one month.  
Next Office Visit: none scheduled  Last seen: 11/12/20  Last refilled: 10/15/20  Medication: lamoTRIgine (LaMICtal) 100 MG tablet    She will return in 1 month for a follow up.             
Writer called pt to let her know that Lamotrigine 100 mg was sent to the pharmacy.  Pt will need to schedule an office visit with Joie Jolly prior to next refill.  Please call and schedule at [phone] opt 2  
GOAL:Pt will ambulate 50ft with RW and CG assist in 2wks.

## 2021-12-13 ENCOUNTER — TRANSCRIPTION ENCOUNTER (OUTPATIENT)
Age: 86
End: 2021-12-13

## 2021-12-13 VITALS
OXYGEN SATURATION: 94 % | SYSTOLIC BLOOD PRESSURE: 170 MMHG | DIASTOLIC BLOOD PRESSURE: 90 MMHG | HEART RATE: 83 BPM | TEMPERATURE: 98 F | RESPIRATION RATE: 18 BRPM

## 2021-12-13 PROCEDURE — 85025 COMPLETE CBC W/AUTO DIFF WBC: CPT

## 2021-12-13 PROCEDURE — 81001 URINALYSIS AUTO W/SCOPE: CPT

## 2021-12-13 PROCEDURE — 70450 CT HEAD/BRAIN W/O DYE: CPT | Mod: MA

## 2021-12-13 PROCEDURE — 80053 COMPREHEN METABOLIC PANEL: CPT

## 2021-12-13 PROCEDURE — 73502 X-RAY EXAM HIP UNI 2-3 VIEWS: CPT

## 2021-12-13 PROCEDURE — 72131 CT LUMBAR SPINE W/O DYE: CPT | Mod: MA

## 2021-12-13 PROCEDURE — U0005: CPT

## 2021-12-13 PROCEDURE — 93005 ELECTROCARDIOGRAM TRACING: CPT

## 2021-12-13 PROCEDURE — 72125 CT NECK SPINE W/O DYE: CPT | Mod: MA

## 2021-12-13 PROCEDURE — 99285 EMERGENCY DEPT VISIT HI MDM: CPT | Mod: 25

## 2021-12-13 PROCEDURE — 73590 X-RAY EXAM OF LOWER LEG: CPT

## 2021-12-13 PROCEDURE — U0003: CPT

## 2021-12-13 PROCEDURE — 97162 PT EVAL MOD COMPLEX 30 MIN: CPT

## 2021-12-13 PROCEDURE — 71250 CT THORAX DX C-: CPT | Mod: MA

## 2021-12-13 PROCEDURE — 72170 X-RAY EXAM OF PELVIS: CPT

## 2021-12-13 RX ORDER — LIDOCAINE 4 G/100G
1 CREAM TOPICAL
Qty: 0 | Refills: 0 | DISCHARGE
Start: 2021-12-13

## 2021-12-13 RX ADMIN — Medication 650 MILLIGRAM(S): at 12:07

## 2021-12-13 RX ADMIN — LIDOCAINE 1 PATCH: 4 CREAM TOPICAL at 22:35

## 2021-12-13 RX ADMIN — Medication 1000 UNIT(S): at 11:00

## 2021-12-13 RX ADMIN — Medication 650 MILLIGRAM(S): at 11:01

## 2021-12-13 RX ADMIN — LIDOCAINE 1 PATCH: 4 CREAM TOPICAL at 19:00

## 2021-12-13 RX ADMIN — CLOPIDOGREL BISULFATE 75 MILLIGRAM(S): 75 TABLET, FILM COATED ORAL at 11:00

## 2021-12-13 RX ADMIN — ENOXAPARIN SODIUM 30 MILLIGRAM(S): 100 INJECTION SUBCUTANEOUS at 11:00

## 2021-12-13 RX ADMIN — Medication 650 MILLIGRAM(S): at 00:49

## 2021-12-13 RX ADMIN — LIDOCAINE 1 PATCH: 4 CREAM TOPICAL at 11:23

## 2021-12-13 RX ADMIN — Medication 1 TABLET(S): at 11:23

## 2021-12-13 NOTE — DISCHARGE NOTE NURSING/CASE MANAGEMENT/SOCIAL WORK - PATIENT PORTAL LINK FT
You can access the FollowMyHealth Patient Portal offered by [facility] by registering at the following website: [email or web address]. By joining [facility]’s FollowMyHealth portal, you will also be able to view your health information using other applications (apps) compatible with our system.

## 2021-12-13 NOTE — PROGRESS NOTE ADULT - SUBJECTIVE AND OBJECTIVE BOX
Patient is a 89y old  Female who presents with a chief complaint of Fall   T 12- L 1- L4comp fracture (13 Dec 2021 13:45)      SUBJECTIVE / OVERNIGHT EVENTS:  Pt seen and examined with aide at bedside. No acute events overnight. She c/o right sided hip pain and low back pain.     MEDICATIONS  (STANDING):  calcium carbonate 1250 mG  + Vitamin D (OsCal 500 + D) 1 Tablet(s) Oral daily  cholecalciferol 1000 Unit(s) Oral daily  clopidogrel Tablet 75 milliGRAM(s) Oral daily  enoxaparin Injectable 30 milliGRAM(s) SubCutaneous daily  lidocaine   4% Patch 1 Patch Transdermal daily    MEDICATIONS  (PRN):  acetaminophen     Tablet .. 650 milliGRAM(s) Oral every 6 hours PRN Temp greater or equal to 38C (100.4F), Mild Pain (1 - 3), Moderate Pain (4 - 6), Severe Pain (7 - 10)      Vital Signs Last 24 Hrs  T(C): 36.1 (13 Dec 2021 10:59), Max: 36.9 (12 Dec 2021 20:16)  T(F): 97 (13 Dec 2021 10:59), Max: 98.4 (12 Dec 2021 20:16)  HR: 80 (13 Dec 2021 10:59) (69 - 82)  BP: 179/68 (13 Dec 2021 10:59) (142/60 - 179/68)  BP(mean): --  RR: 18 (13 Dec 2021 10:59) (16 - 18)  SpO2: 97% (13 Dec 2021 10:59) (93% - 97%)  CAPILLARY BLOOD GLUCOSE        I&O's Summary      PHYSICAL EXAM:  GENERAL: NAD, frail  HEAD:  Atraumatic, Normocephalic  EYES: conjunctiva and sclera clear  NECK: Supple, No JVD  CHEST/LUNG: Clear to auscultation bilaterally; No wheeze  HEART: Regular rate and rhythm; No murmurs, rubs, or gallops  ABDOMEN: Soft, Nontender, Nondistended; Bowel sounds present  EXTREMITIES:  Limited ROS due to pain, no LE edema  PSYCH: appropriate affect  NEUROLOGY:  AAOX 3, non-focal  SKIN: No rashes or lesions    LABS:                        10.9   10.72 )-----------( 172      ( 12 Dec 2021 14:54 )             33.4     12-12    136  |  100  |  25<H>  ----------------------------<  111<H>  4.1   |  23  |  0.69    Ca    9.0      12 Dec 2021 14:54    TPro  7.1  /  Alb  3.8  /  TBili  0.7  /  DBili  x   /  AST  13  /  ALT  8<L>  /  AlkPhos  123<H>            Urinalysis Basic - ( 12 Dec 2021 12:26 )    Color: Light Yellow / Appearance: Clear / S.020 / pH: x  Gluc: x / Ketone: Negative  / Bili: Negative / Urobili: Negative   Blood: x / Protein: Trace / Nitrite: Negative   Leuk Esterase: Moderate / RBC: 5 /hpf / WBC 3 /HPF   Sq Epi: x / Non Sq Epi: 2 /hpf / Bacteria: Occasional

## 2021-12-13 NOTE — DISCHARGE NOTE PROVIDER - HOSPITAL COURSE
89F with PMHx of macular degeneration (appears to be legally blind), severe presbycusis, and hypertension presents after a fall at home after she missed her recliner and slid onto the floor onto her buttocks. In the ED patient essentially pan-scanned with no acute fractures, chronic of T12, L1, L4 and L5, cervical stenosis and unchanged meningioma with no midline shift.       ·  Fall.   ·  : -PT consulted and recommended for YOON and patient okay with it  -Fall risk protocol, ambulate w/ assistance.       ·   Lumbar compression fracture.   ·   TLSO brace  -Monitor for neurological alarm symptoms  -Tylenol PRN  -Lidocaine patch  -Supplement with Vitamin D and Calcium.       ·  : Hypertension.   ·  Plan: Continue to monitor. JNC8 goal BP for someone of this age and no other comorbidities is <150/90.       ·  Meningioma.   ·  Plan: Unchanged from prior scans with no mass effect/midline shift. Patient can continue with monitor as outpatient with imaging, but based on growth trajectory and her age this is unlikely to cause her any issues.    attending has clear this patient for discharge

## 2021-12-13 NOTE — PROGRESS NOTE ADULT - NSPROGADDITIONALINFOA_GEN_ALL_CORE
Based on my assessment, this patient’s condition has improved and no longer warrants inpatient status and will be changed to outpatient status prior to being discharged from the hospital.  This decision is based on the following reasons ; stable vial signs, benign physical exam and absence of alarming neurological symptoms.    d/w VANESSA Membreno

## 2021-12-13 NOTE — PROGRESS NOTE ADULT - PROBLEM SELECTOR PLAN 1
- mechanical fall per hx ;  pt slid off a recliner , no headstrike , no LOC  - PT consulted and reccs YOON ; can be done at [facility] where she currently resides  -Fall risk protocol, ambulate w/ assistance

## 2021-12-13 NOTE — PROGRESS NOTE ADULT - PROBLEM SELECTOR PLAN 4
Unchanged from prior scans with no mass effect/midline shift.   Patient can continue with monitor as outpatient with imaging  but based on growth trajectory and her age this is unlikely to cause her any issues.

## 2021-12-13 NOTE — DISCHARGE NOTE PROVIDER - NSDCMRMEDTOKEN_GEN_ALL_CORE_FT
calcium-vitamin D 500 mg-5 mcg (200 intl units) oral tablet: 1 tab(s) orally once a day  lidocaine 4% topical film: Apply topically to affected area once a day  Plavix: 75 milligram(s) orally once a day  TLSO BRACE: 1  buccal once a day

## 2021-12-13 NOTE — DISCHARGE NOTE PROVIDER - NSDCCPCAREPLAN_GEN_ALL_CORE_FT
PRINCIPAL DISCHARGE DIAGNOSIS  Diagnosis: Strain, back, initial encounter  Assessment and Plan of Treatment: T12 FX Tlso brace - Rehab      SECONDARY DISCHARGE DIAGNOSES  Diagnosis: Contusion of lower back, initial encounter  Assessment and Plan of Treatment:

## 2021-12-13 NOTE — PROGRESS NOTE ADULT - SUBJECTIVE AND OBJECTIVE BOX
Patient eval custom measured and fitted for TLSO spinal orthosis  to treat T11 L1 L4 L% compression fractures ordered by internal medicine  delivered by [facility] [phone] spinal orthosis will aid in  immobilization of fractures and reduce pain levels allow patient to  weight bear for OBB delivered as ordered

## 2021-12-13 NOTE — DISCHARGE NOTE NURSING/CASE MANAGEMENT/SOCIAL WORK - NSDCPEFALRISK_GEN_ALL_CORE
For information on Fall & Injury Prevention, visit: [email or web address] OR  [email or web address] OR  https://www.cdc.gov/steadi/patient.html

## 2021-12-13 NOTE — PROGRESS NOTE ADULT - PROBLEM SELECTOR PLAN 2
-Obtain TLSO brace  -No neurological alarm symptoms noted  -Tylenol PRN  -Lidocaine patch  -Supplement with Vitamin D and Calcium

## 2022-12-01 ENCOUNTER — INPATIENT (INPATIENT)
Facility: HOSPITAL | Age: 87
LOS: 8 days | Discharge: NURSING, REHAB OR LONG-TERM CARE | DRG: 438 | End: 2022-12-10
Attending: INTERNAL MEDICINE | Admitting: INTERNAL MEDICINE
Payer: MEDICARE

## 2022-12-01 VITALS
WEIGHT: 119.93 LBS | HEART RATE: 107 BPM | OXYGEN SATURATION: 99 % | TEMPERATURE: 98 F | DIASTOLIC BLOOD PRESSURE: 81 MMHG | HEIGHT: 62 IN | SYSTOLIC BLOOD PRESSURE: 152 MMHG | RESPIRATION RATE: 18 BRPM

## 2022-12-01 DIAGNOSIS — K85.90 ACUTE PANCREATITIS WITHOUT NECROSIS OR INFECTION, UNSPECIFIED: ICD-10-CM

## 2022-12-01 DIAGNOSIS — Z96.642 PRESENCE OF LEFT ARTIFICIAL HIP JOINT: Chronic | ICD-10-CM

## 2022-12-01 LAB
ALBUMIN SERPL ELPH-MCNC: 3.9 G/DL — SIGNIFICANT CHANGE UP (ref 3.3–5)
ALP SERPL-CCNC: 67 U/L — SIGNIFICANT CHANGE UP (ref 40–120)
ALT FLD-CCNC: 9 U/L — LOW (ref 10–45)
ANION GAP SERPL CALC-SCNC: 17 MMOL/L — SIGNIFICANT CHANGE UP (ref 5–17)
APPEARANCE UR: ABNORMAL
AST SERPL-CCNC: 21 U/L — SIGNIFICANT CHANGE UP (ref 10–40)
BACTERIA # UR AUTO: NEGATIVE — SIGNIFICANT CHANGE UP
BASE EXCESS BLDV CALC-SCNC: -3 MMOL/L — LOW (ref -2–3)
BASOPHILS # BLD AUTO: 0.03 K/UL — SIGNIFICANT CHANGE UP (ref 0–0.2)
BASOPHILS NFR BLD AUTO: 0.2 % — SIGNIFICANT CHANGE UP (ref 0–2)
BILIRUB SERPL-MCNC: 0.8 MG/DL — SIGNIFICANT CHANGE UP (ref 0.2–1.2)
BILIRUB UR-MCNC: NEGATIVE — SIGNIFICANT CHANGE UP
BUN SERPL-MCNC: 27 MG/DL — HIGH (ref 7–23)
CA-I SERPL-SCNC: 1.25 MMOL/L — SIGNIFICANT CHANGE UP (ref 1.15–1.33)
CALCIUM SERPL-MCNC: 9.8 MG/DL — SIGNIFICANT CHANGE UP (ref 8.4–10.5)
CHLORIDE BLDV-SCNC: 102 MMOL/L — SIGNIFICANT CHANGE UP (ref 96–108)
CHLORIDE SERPL-SCNC: 100 MMOL/L — SIGNIFICANT CHANGE UP (ref 96–108)
CO2 BLDV-SCNC: 22 MMOL/L — SIGNIFICANT CHANGE UP (ref 22–26)
CO2 SERPL-SCNC: 19 MMOL/L — LOW (ref 22–31)
COLOR SPEC: YELLOW — SIGNIFICANT CHANGE UP
CREAT SERPL-MCNC: 0.91 MG/DL — SIGNIFICANT CHANGE UP (ref 0.5–1.3)
DIFF PNL FLD: ABNORMAL
EGFR: 56 ML/MIN/1.73M2 — LOW
EOSINOPHIL # BLD AUTO: 0.09 K/UL — SIGNIFICANT CHANGE UP (ref 0–0.5)
EOSINOPHIL NFR BLD AUTO: 0.6 % — SIGNIFICANT CHANGE UP (ref 0–6)
EPI CELLS # UR: 3 /HPF — SIGNIFICANT CHANGE UP
GAS PNL BLDV: 132 MMOL/L — LOW (ref 136–145)
GAS PNL BLDV: SIGNIFICANT CHANGE UP
GAS PNL BLDV: SIGNIFICANT CHANGE UP
GLUCOSE BLDV-MCNC: 61 MG/DL — LOW (ref 70–99)
GLUCOSE SERPL-MCNC: 69 MG/DL — LOW (ref 70–99)
GLUCOSE UR QL: ABNORMAL
GRAN CASTS # UR COMP ASSIST: 8 /LPF — SIGNIFICANT CHANGE UP
HCO3 BLDV-SCNC: 21 MMOL/L — LOW (ref 22–29)
HCT VFR BLD CALC: 34.8 % — SIGNIFICANT CHANGE UP (ref 34.5–45)
HCT VFR BLDA CALC: 37 % — SIGNIFICANT CHANGE UP (ref 34.5–46.5)
HGB BLD CALC-MCNC: 12.3 G/DL — SIGNIFICANT CHANGE UP (ref 11.7–16.1)
HGB BLD-MCNC: 11.6 G/DL — SIGNIFICANT CHANGE UP (ref 11.5–15.5)
HYALINE CASTS # UR AUTO: 31 /LPF — HIGH (ref 0–2)
IMM GRANULOCYTES NFR BLD AUTO: 0.6 % — SIGNIFICANT CHANGE UP (ref 0–0.9)
KETONES UR-MCNC: ABNORMAL
LACTATE BLDV-MCNC: 1.5 MMOL/L — SIGNIFICANT CHANGE UP (ref 0.5–2)
LEUKOCYTE ESTERASE UR-ACNC: NEGATIVE — SIGNIFICANT CHANGE UP
LIDOCAIN IGE QN: >3000 U/L — HIGH (ref 7–60)
LYMPHOCYTES # BLD AUTO: 1.16 K/UL — SIGNIFICANT CHANGE UP (ref 1–3.3)
LYMPHOCYTES # BLD AUTO: 7.5 % — LOW (ref 13–44)
MCHC RBC-ENTMCNC: 29.5 PG — SIGNIFICANT CHANGE UP (ref 27–34)
MCHC RBC-ENTMCNC: 33.3 GM/DL — SIGNIFICANT CHANGE UP (ref 32–36)
MCV RBC AUTO: 88.5 FL — SIGNIFICANT CHANGE UP (ref 80–100)
MONOCYTES # BLD AUTO: 1.1 K/UL — HIGH (ref 0–0.9)
MONOCYTES NFR BLD AUTO: 7.1 % — SIGNIFICANT CHANGE UP (ref 2–14)
NEUTROPHILS # BLD AUTO: 12.95 K/UL — HIGH (ref 1.8–7.4)
NEUTROPHILS NFR BLD AUTO: 84 % — HIGH (ref 43–77)
NITRITE UR-MCNC: NEGATIVE — SIGNIFICANT CHANGE UP
NRBC # BLD: 0 /100 WBCS — SIGNIFICANT CHANGE UP (ref 0–0)
PCO2 BLDV: 33 MMHG — LOW (ref 39–42)
PH BLDV: 7.41 — SIGNIFICANT CHANGE UP (ref 7.32–7.43)
PH UR: 5.5 — SIGNIFICANT CHANGE UP (ref 5–8)
PLATELET # BLD AUTO: 198 K/UL — SIGNIFICANT CHANGE UP (ref 150–400)
PO2 BLDV: 139 MMHG — HIGH (ref 25–45)
POTASSIUM BLDV-SCNC: 4.8 MMOL/L — SIGNIFICANT CHANGE UP (ref 3.5–5.1)
POTASSIUM SERPL-MCNC: 4.4 MMOL/L — SIGNIFICANT CHANGE UP (ref 3.5–5.3)
POTASSIUM SERPL-SCNC: 4.4 MMOL/L — SIGNIFICANT CHANGE UP (ref 3.5–5.3)
PROT SERPL-MCNC: 7.8 G/DL — SIGNIFICANT CHANGE UP (ref 6–8.3)
PROT UR-MCNC: 100 — SIGNIFICANT CHANGE UP
RBC # BLD: 3.93 M/UL — SIGNIFICANT CHANGE UP (ref 3.8–5.2)
RBC # FLD: 14.2 % — SIGNIFICANT CHANGE UP (ref 10.3–14.5)
RBC CASTS # UR COMP ASSIST: 5 /HPF — HIGH (ref 0–4)
SAO2 % BLDV: 99.7 % — HIGH (ref 67–88)
SARS-COV-2 RNA SPEC QL NAA+PROBE: SIGNIFICANT CHANGE UP
SODIUM SERPL-SCNC: 136 MMOL/L — SIGNIFICANT CHANGE UP (ref 135–145)
SP GR SPEC: 1.02 — SIGNIFICANT CHANGE UP (ref 1.01–1.02)
UROBILINOGEN FLD QL: NEGATIVE — SIGNIFICANT CHANGE UP
WBC # BLD: 15.42 K/UL — HIGH (ref 3.8–10.5)
WBC # FLD AUTO: 15.42 K/UL — HIGH (ref 3.8–10.5)
WBC UR QL: 8 /HPF — HIGH (ref 0–5)

## 2022-12-01 PROCEDURE — 74176 CT ABD & PELVIS W/O CONTRAST: CPT | Mod: 26,MA

## 2022-12-01 PROCEDURE — 76705 ECHO EXAM OF ABDOMEN: CPT | Mod: 26

## 2022-12-01 PROCEDURE — 99285 EMERGENCY DEPT VISIT HI MDM: CPT

## 2022-12-01 RX ORDER — CLOPIDOGREL BISULFATE 75 MG/1
75 TABLET, FILM COATED ORAL DAILY
Refills: 0 | Status: DISCONTINUED | OUTPATIENT
Start: 2022-12-01 | End: 2022-12-10

## 2022-12-01 RX ORDER — SODIUM CHLORIDE 9 MG/ML
500 INJECTION INTRAMUSCULAR; INTRAVENOUS; SUBCUTANEOUS ONCE
Refills: 0 | Status: COMPLETED | OUTPATIENT
Start: 2022-12-01 | End: 2022-12-01

## 2022-12-01 RX ORDER — SODIUM CHLORIDE 9 MG/ML
1000 INJECTION, SOLUTION INTRAVENOUS
Refills: 0 | Status: DISCONTINUED | OUTPATIENT
Start: 2022-12-01 | End: 2022-12-02

## 2022-12-01 RX ORDER — CLOPIDOGREL BISULFATE 75 MG/1
1 TABLET, FILM COATED ORAL
Qty: 0 | Refills: 0 | DISCHARGE

## 2022-12-01 RX ORDER — DEXTROSE 50 % IN WATER 50 %
50 SYRINGE (ML) INTRAVENOUS ONCE
Refills: 0 | Status: COMPLETED | OUTPATIENT
Start: 2022-12-01 | End: 2022-12-01

## 2022-12-01 RX ORDER — ACETAMINOPHEN 500 MG
2 TABLET ORAL
Qty: 0 | Refills: 0 | DISCHARGE

## 2022-12-01 RX ORDER — SODIUM CHLORIDE 9 MG/ML
1000 INJECTION, SOLUTION INTRAVENOUS ONCE
Refills: 0 | Status: COMPLETED | OUTPATIENT
Start: 2022-12-01 | End: 2022-12-01

## 2022-12-01 RX ORDER — ACETAMINOPHEN 500 MG
1000 TABLET ORAL ONCE
Refills: 0 | Status: COMPLETED | OUTPATIENT
Start: 2022-12-01 | End: 2022-12-01

## 2022-12-01 RX ADMIN — SODIUM CHLORIDE 500 MILLILITER(S): 9 INJECTION INTRAMUSCULAR; INTRAVENOUS; SUBCUTANEOUS at 12:58

## 2022-12-01 RX ADMIN — Medication 400 MILLIGRAM(S): at 12:56

## 2022-12-01 RX ADMIN — SODIUM CHLORIDE 1000 MILLILITER(S): 9 INJECTION, SOLUTION INTRAVENOUS at 16:04

## 2022-12-01 RX ADMIN — SODIUM CHLORIDE 100 MILLILITER(S): 9 INJECTION, SOLUTION INTRAVENOUS at 18:38

## 2022-12-01 RX ADMIN — Medication 50 MILLILITER(S): at 15:11

## 2022-12-01 NOTE — H&P ADULT - NSHPPHYSICALEXAM_GEN_ALL_CORE
Vital Signs Last 24 Hrs  T(C): 36.8 (01 Dec 2022 18:24), Max: 36.8 (01 Dec 2022 18:24)  T(F): 98.3 (01 Dec 2022 18:24), Max: 98.3 (01 Dec 2022 18:24)  HR: 95 (01 Dec 2022 18:24) (84 - 116)  BP: 159/69 (01 Dec 2022 18:24) (152/81 - 183/75)  BP(mean): --  RR: 18 (01 Dec 2022 18:24) (18 - 20)  SpO2: 96% (01 Dec 2022 18:24) (96% - 99%)    Parameters below as of 01 Dec 2022 18:24  Patient On (Oxygen Delivery Method): room air      PHYSICAL EXAM:  GENERAL: NAD, well-developed  HEAD:  Atraumatic, Normocephalic  EYES: EOMI, PERRLA, conjunctiva and sclera clear  NECK: Supple, No JVD  CHEST/LUNG: Clear to auscultation bilaterally; No wheeze  HEART: Regular rate and rhythm; No murmurs, rubs, or gallops  ABDOMEN: Soft, mild diffuse tender, Nondistended; Bowel sounds present  EXTREMITIES:  2+ Peripheral Pulses, No clubbing, cyanosis, or edema  PSYCH: AAOx3  NEUROLOGY: non-focal  SKIN: No rashes or lesions

## 2022-12-01 NOTE — ED PROVIDER NOTE - PHYSICAL EXAMINATION
Physical Exam:  Gen: NAD, AOx3, non-toxic appearing  Head: normal appearing  HEENT: normal conjunctiva, oral mucosa moist  Lung:  no respiratory distress, speaking in full sentences, clear to ascultation bilaterally     CV: regular rate and rhythm   Abd: soft, distended diffuse ttp no rt no cva ttp  MSK: no visible deformities  Neuro: No focal deficits  Skin: Warm no leg swelling calf ttp   Psych: normal affect  ~Regino Fuentes D.O. -ED Attending

## 2022-12-01 NOTE — ED PROVIDER NOTE - PROGRESS NOTE DETAILS
Gagnadeep, PGY3: Patient reporting severe allergy to IV contrast. Will get non-con CT. Chris, PGY3: CT notable for acute pancreatitis. Patient is actually a patient of Dr. Conner Molina and is being covered by Dr. Covington. Discussed w/ Dr. Covington

## 2022-12-01 NOTE — ED PROVIDER NOTE - ATTENDING CONTRIBUTION TO CARE
I, Regino Fuentes, performed a history and physical exam of the patient and discussed their management with the resident and/or advanced care provider. I reviewed the resident and/or advanced care provider's note and agree with the documented findings and plan of care. I was present and available for all procedures.    See my full note for details

## 2022-12-01 NOTE — H&P ADULT - HISTORY OF PRESENT ILLNESS
89yof PMHx of macular degeneration (appears to be legally blind), severe presbycusis, and hypertension presents after 1 week of abdominal distention mild nausea enlarging abdomen went to PMD Dr. Rodriguez having routine blood work done for this issue and sent in today for evaluation after lipase greater than 6000.  Denies previous abdominal surgeries denies constipation diarrhea. Denies recent trauma, fevers, chills, headache, dizziness, nausea, vomiting, dysuria, freq, hematuria, diarrhea, constipation, chest pain, shortness of breath, cough.

## 2022-12-01 NOTE — ED PROVIDER NOTE - CLINICAL SUMMARY MEDICAL DECISION MAKING FREE TEXT BOX
pettet attending- concerning for pancreatitis eval for mass ctap screening labs analgesia prn fluids admit for further mgmt lipid panel

## 2022-12-01 NOTE — CONSULT NOTE ADULT - ASSESSMENT
abdominal pain   elevated lipase   pancreatitis     lipase >3000  CT non con pending   NPO  advance diet as tolerated  IVF   check lipid profile   trend lipase  pain meds prn  will follow  dw pt and son at bedside     Advanced care planning was discussed with patient and family.  Advanced care planning forms were reviewed and discussed.  Risks, benefits and alternatives of gastroenterologic procedures were discussed in detail and all questions were answered.    30 minutes spent.

## 2022-12-01 NOTE — ED PROVIDER NOTE - NSICDXPASTMEDICALHX_GEN_ALL_CORE_FT
PAST MEDICAL HISTORY:  Arthritis     GERD (gastroesophageal reflux disease)     Hard of hearing, bilateral     Macular degeneration     Visual impairment      (1) Oriented to own ability

## 2022-12-01 NOTE — H&P ADULT - ASSESSMENT
89 yo female h/o macular degeneration, htn, here with pancreatitis    pancreatitis  gi consulted  npo  ivf  pain control  CT noted  check lipid panel    htn  not on meds at home  monitor    d/w pt and son at bedside      Advanced care planning was discussed with patient and family.  Advanced care planning forms were reviewed and discussed as appropriate.  Differential diagnosis and plan of care discussed with patient after the evaluation.   Pain assessed and judicious use of narcotics when appropriate was discussed.  Importance of Fall prevention discussed.  Counseling on Smoking and Alcohol cessation was offered when appropriate.  Counseling on Diet, exercise, and medication compliance was done.   Approx 30 minutes spent. 89 yo female h/o macular degeneration, htn, here with pancreatitis    pancreatitis  gi consulted  npo  ivf  pain control  CT noted  check lipid panel    htn  not on meds at home  monitor    peripheral arterial disease  cont plavix    d/w pt and son at bedside    PT    Advanced care planning was discussed with patient and family.  Advanced care planning forms were reviewed and discussed as appropriate.  Differential diagnosis and plan of care discussed with patient after the evaluation.   Pain assessed and judicious use of narcotics when appropriate was discussed.  Importance of Fall prevention discussed.  Counseling on Smoking and Alcohol cessation was offered when appropriate.  Counseling on Diet, exercise, and medication compliance was done.   Approx 30 minutes spent.

## 2022-12-01 NOTE — ED PROVIDER NOTE - NS ED ROS FT
ROS:  GENERAL: No fever, no chills  EYES: no change in vision  HEENT: no trouble swallowing, no trouble speaking  CARDIAC: no chest pain  PULMONARY: no cough, no shortness of breath  GI:+abdominal pain, + nausea, no vomiting, no diarrhea, no constipation  : No dysuria, no frequency, no change in appearance, or odor of urine  SKIN: no rashes  NEURO: no headache, no weakness  MSK: No joint pain  ~Regino Fuentes D.O. -ED Attending

## 2022-12-01 NOTE — ED ADULT NURSE NOTE - OBJECTIVE STATEMENT
89 y female presents from home with 1 week of abdominal pain, nausea, and distention. reports to following with PCP for symptoms- was told to come to ED today for elated lipase. denies cp, sob, lightheadedness, dizziness, cough, sob. a&ox3. skin warm and dry. abdomen soft- reports to bloating and distention. safety maintained- bed locked in lowest position. awaiting labs, radiology and dispo.

## 2022-12-01 NOTE — CONSULT NOTE ADULT - SUBJECTIVE AND OBJECTIVE BOX
[facility]  Keegan Cox PA-C  [address] [place] [id number] [phone]      Chief Complaint:  Patient is a 89y old  Female who presents with a chief complaint of     HPI: 89yof PMHx of macular degeneration (appears to be legally blind), severe presbycusis, and hypertension presents after 1 week of abdominal distention mild nausea enlarging abdomen went to PMD Dr. Rodriguez having routine blood work done for this issue and sent in today for evaluation after lipase greater than 6000.  Denies previous abdominal surgeries denies constipation diarrhea. Denies recent trauma, fevers, chills, headache, dizziness, nausea, vomiting, dysuria, freq, hematuria, diarrhea, constipation, chest pain, shortness of breath, cough.    GI asked to consult for pancreatitis  -pt seen and examined in the ER with son at bedside. As per pt and son, she has had mild epigastric abdominal pain for about 1 week. Also had complaints of nausea. Went to PCP, had labs drawn showing lipase >6000. Also c/o abdominal distention.  -no hx of pancreatitis in the past  -no hx gallstones  -in the ER pt received IV Tylenol pain currently well controlled.    Allergies:  bacitracin (Unknown)  Flonase (Unknown)  fluorescein (Unknown)  iodine (Anaphylaxis)  iodine topical (Unknown)  latex (Unknown)  Levaquin (Unknown)  levofloxacin (Unknown)  morphine (Unknown)  Neosporin (Unknown)  shellfish (Anaphylaxis)  shellfish (Unknown)  sulfa drugs (Unknown)      Medications:  lactated ringers Bolus 1000 milliLiter(s) IV Bolus once  lactated ringers. 1000 milliLiter(s) IV Continuous <Continuous>      PMHX/PSHX:  Seizure disorder    GERD (gastroesophageal reflux disease)    Arthritis    Macular degeneration    Hard of hearing, bilateral    Visual impairment    No Past Surgical History    S/P hip replacement, left        Family history:      Social History:     ROS:     General:  No wt loss, fevers, chills, night sweats, fatigue,   Eyes:  Good vision, no reported pain  ENT:  No sore throat, pain, runny nose, dysphagia  CV:  No pain, palpitations, hypo/hypertension  Resp:  No dyspnea, cough, tachypnea, wheezing  GI:  + pain, + nausea, No vomiting, No diarrhea, No constipation, No weight loss, No fever, No pruritis, No rectal bleeding, No tarry stools, No dysphagia,  :  No pain, bleeding, incontinence, nocturia  Muscle:  No pain, weakness  Neuro:  No weakness, tingling, memory problems  Psych:  No fatigue, insomnia, mood problems, depression  Endocrine:  No polyuria, polydipsia, cold/heat intolerance  Heme:  No petechiae, ecchymosis, easy bruisability  Skin:  No rash, tattoos, scars, edema      PHYSICAL EXAM:   Vital Signs:  Vital Signs Last 24 Hrs  T(C): 36.6 (01 Dec 2022 12:15), Max: 36.7 (01 Dec 2022 11:36)  T(F): 97.9 (01 Dec 2022 12:15), Max: 98 (01 Dec 2022 11:36)  HR: 116 (01 Dec 2022 12:15) (107 - 116)  BP: 183/75 (01 Dec 2022 12:15) (152/81 - 183/75)  BP(mean): --  RR: 20 (01 Dec 2022 12:15) (18 - 20)  SpO2: 99% (01 Dec 2022 12:15) (99% - 99%)    Parameters below as of 01 Dec 2022 12:15  Patient On (Oxygen Delivery Method): room air      Daily Height in cm: 157.48 (01 Dec 2022 11:36)    Daily     nad  alert   frail  non toxic  soft, ttp  no edema      LABS:                        11.6   15.42 )-----------( 198      ( 01 Dec 2022 13:03 )             34.8     12-01    136  |  100  |  27<H>  ----------------------------<  69<L>  4.4   |  19<L>  |  0.91    Ca    9.8      01 Dec 2022 13:03    TPro  7.8  /  Alb  3.9  /  TBili  0.8  /  DBili  x   /  AST  21  /  ALT  9<L>  /  AlkPhos  67  12-01    LIVER FUNCTIONS - ( 01 Dec 2022 13:03 )  Alb: 3.9 g/dL / Pro: 7.8 g/dL / ALK PHOS: 67 U/L / ALT: 9 U/L / AST: 21 U/L / GGT: x               Amylase Serum--      Lipase serum>3000       Ammonia--      Imaging:

## 2022-12-01 NOTE — ED PROVIDER NOTE - OBJECTIVE STATEMENT
brady attending- 89yof PMHx of macular degeneration (appears to be legally blind), severe presbycusis, and hypertension presents after 1 week of abdominal distention mild nausea enlarging abdomen went to PMD Dr. Rodriguez having routine blood work done for this issue and sent in today for evaluation after lipase greater than 6000.  Denies previous abdominal surgeries denies constipation diarrhea. Denies recent trauma, fevers, chills, headache, dizziness, nausea, vomiting, dysuria, freq, hematuria, diarrhea, constipation, chest pain, shortness of breath, cough.

## 2022-12-02 DIAGNOSIS — K85.90 ACUTE PANCREATITIS WITHOUT NECROSIS OR INFECTION, UNSPECIFIED: ICD-10-CM

## 2022-12-02 DIAGNOSIS — I10 ESSENTIAL (PRIMARY) HYPERTENSION: ICD-10-CM

## 2022-12-02 DIAGNOSIS — R09.02 HYPOXEMIA: ICD-10-CM

## 2022-12-02 LAB
ALBUMIN SERPL ELPH-MCNC: 2.8 G/DL — LOW (ref 3.3–5)
ALBUMIN SERPL ELPH-MCNC: 3.3 G/DL — SIGNIFICANT CHANGE UP (ref 3.3–5)
ALP SERPL-CCNC: 47 U/L — SIGNIFICANT CHANGE UP (ref 40–120)
ALP SERPL-CCNC: 62 U/L — SIGNIFICANT CHANGE UP (ref 40–120)
ALT FLD-CCNC: 11 U/L — SIGNIFICANT CHANGE UP (ref 10–45)
ALT FLD-CCNC: 13 U/L — SIGNIFICANT CHANGE UP (ref 10–45)
ANION GAP SERPL CALC-SCNC: 13 MMOL/L — SIGNIFICANT CHANGE UP (ref 5–17)
ANION GAP SERPL CALC-SCNC: 16 MMOL/L — SIGNIFICANT CHANGE UP (ref 5–17)
AST SERPL-CCNC: 19 U/L — SIGNIFICANT CHANGE UP (ref 10–40)
AST SERPL-CCNC: 28 U/L — SIGNIFICANT CHANGE UP (ref 10–40)
BILIRUB SERPL-MCNC: 0.6 MG/DL — SIGNIFICANT CHANGE UP (ref 0.2–1.2)
BILIRUB SERPL-MCNC: 0.8 MG/DL — SIGNIFICANT CHANGE UP (ref 0.2–1.2)
BLD GP AB SCN SERPL QL: NEGATIVE — SIGNIFICANT CHANGE UP
BUN SERPL-MCNC: 16 MG/DL — SIGNIFICANT CHANGE UP (ref 7–23)
BUN SERPL-MCNC: 18 MG/DL — SIGNIFICANT CHANGE UP (ref 7–23)
CALCIUM SERPL-MCNC: 8.6 MG/DL — SIGNIFICANT CHANGE UP (ref 8.4–10.5)
CALCIUM SERPL-MCNC: 8.9 MG/DL — SIGNIFICANT CHANGE UP (ref 8.4–10.5)
CHLORIDE SERPL-SCNC: 101 MMOL/L — SIGNIFICANT CHANGE UP (ref 96–108)
CHLORIDE SERPL-SCNC: 104 MMOL/L — SIGNIFICANT CHANGE UP (ref 96–108)
CHOLEST SERPL-MCNC: 236 MG/DL — HIGH
CK MB BLD-MCNC: 2.8 % — SIGNIFICANT CHANGE UP (ref 0–3.5)
CK MB CFR SERPL CALC: 9.4 NG/ML — HIGH (ref 0–3.8)
CK SERPL-CCNC: 341 U/L — HIGH (ref 25–170)
CO2 SERPL-SCNC: 18 MMOL/L — LOW (ref 22–31)
CO2 SERPL-SCNC: 20 MMOL/L — LOW (ref 22–31)
CREAT SERPL-MCNC: 0.67 MG/DL — SIGNIFICANT CHANGE UP (ref 0.5–1.3)
CREAT SERPL-MCNC: 0.71 MG/DL — SIGNIFICANT CHANGE UP (ref 0.5–1.3)
EGFR: 75 ML/MIN/1.73M2 — SIGNIFICANT CHANGE UP
EGFR: 77 ML/MIN/1.73M2 — SIGNIFICANT CHANGE UP
GAS PNL BLDA: SIGNIFICANT CHANGE UP
GLUCOSE BLDC GLUCOMTR-MCNC: 93 MG/DL — SIGNIFICANT CHANGE UP (ref 70–99)
GLUCOSE SERPL-MCNC: 136 MG/DL — HIGH (ref 70–99)
GLUCOSE SERPL-MCNC: 59 MG/DL — LOW (ref 70–99)
HCT VFR BLD CALC: 28 % — LOW (ref 34.5–45)
HCT VFR BLD CALC: 34.5 % — SIGNIFICANT CHANGE UP (ref 34.5–45)
HDLC SERPL-MCNC: 56 MG/DL — SIGNIFICANT CHANGE UP
HGB BLD-MCNC: 11.3 G/DL — LOW (ref 11.5–15.5)
HGB BLD-MCNC: 9.3 G/DL — LOW (ref 11.5–15.5)
LIDOCAIN IGE QN: 2111 U/L — HIGH (ref 7–60)
LIPID PNL WITH DIRECT LDL SERPL: 159 MG/DL — HIGH
MCHC RBC-ENTMCNC: 29 PG — SIGNIFICANT CHANGE UP (ref 27–34)
MCHC RBC-ENTMCNC: 29.4 PG — SIGNIFICANT CHANGE UP (ref 27–34)
MCHC RBC-ENTMCNC: 32.8 GM/DL — SIGNIFICANT CHANGE UP (ref 32–36)
MCHC RBC-ENTMCNC: 33.2 GM/DL — SIGNIFICANT CHANGE UP (ref 32–36)
MCV RBC AUTO: 88.6 FL — SIGNIFICANT CHANGE UP (ref 80–100)
MCV RBC AUTO: 88.7 FL — SIGNIFICANT CHANGE UP (ref 80–100)
NON HDL CHOLESTEROL: 179 MG/DL — HIGH
NRBC # BLD: 0 /100 WBCS — SIGNIFICANT CHANGE UP (ref 0–0)
NRBC # BLD: 0 /100 WBCS — SIGNIFICANT CHANGE UP (ref 0–0)
NT-PROBNP SERPL-SCNC: 3636 PG/ML — HIGH (ref 0–300)
PLATELET # BLD AUTO: 141 K/UL — LOW (ref 150–400)
PLATELET # BLD AUTO: 229 K/UL — SIGNIFICANT CHANGE UP (ref 150–400)
POTASSIUM SERPL-MCNC: 3.6 MMOL/L — SIGNIFICANT CHANGE UP (ref 3.5–5.3)
POTASSIUM SERPL-MCNC: 4.1 MMOL/L — SIGNIFICANT CHANGE UP (ref 3.5–5.3)
POTASSIUM SERPL-SCNC: 3.6 MMOL/L — SIGNIFICANT CHANGE UP (ref 3.5–5.3)
POTASSIUM SERPL-SCNC: 4.1 MMOL/L — SIGNIFICANT CHANGE UP (ref 3.5–5.3)
PROT SERPL-MCNC: 6 G/DL — SIGNIFICANT CHANGE UP (ref 6–8.3)
PROT SERPL-MCNC: 7 G/DL — SIGNIFICANT CHANGE UP (ref 6–8.3)
RBC # BLD: 3.16 M/UL — LOW (ref 3.8–5.2)
RBC # BLD: 3.89 M/UL — SIGNIFICANT CHANGE UP (ref 3.8–5.2)
RBC # FLD: 13.8 % — SIGNIFICANT CHANGE UP (ref 10.3–14.5)
RBC # FLD: 14 % — SIGNIFICANT CHANGE UP (ref 10.3–14.5)
RH IG SCN BLD-IMP: NEGATIVE — SIGNIFICANT CHANGE UP
SODIUM SERPL-SCNC: 135 MMOL/L — SIGNIFICANT CHANGE UP (ref 135–145)
SODIUM SERPL-SCNC: 137 MMOL/L — SIGNIFICANT CHANGE UP (ref 135–145)
TRIGL SERPL-MCNC: 103 MG/DL — SIGNIFICANT CHANGE UP
TRIGL SERPL-MCNC: 103 MG/DL — SIGNIFICANT CHANGE UP
TROPONIN T, HIGH SENSITIVITY RESULT: 99 NG/L — HIGH (ref 0–51)
WBC # BLD: 15.22 K/UL — HIGH (ref 3.8–10.5)
WBC # BLD: 7.4 K/UL — SIGNIFICANT CHANGE UP (ref 3.8–10.5)
WBC # FLD AUTO: 15.22 K/UL — HIGH (ref 3.8–10.5)
WBC # FLD AUTO: 7.4 K/UL — SIGNIFICANT CHANGE UP (ref 3.8–10.5)

## 2022-12-02 PROCEDURE — 93970 EXTREMITY STUDY: CPT | Mod: 26

## 2022-12-02 PROCEDURE — 93010 ELECTROCARDIOGRAM REPORT: CPT

## 2022-12-02 PROCEDURE — 71045 X-RAY EXAM CHEST 1 VIEW: CPT | Mod: 26

## 2022-12-02 RX ORDER — ONDANSETRON 8 MG/1
4 TABLET, FILM COATED ORAL ONCE
Refills: 0 | Status: COMPLETED | OUTPATIENT
Start: 2022-12-02 | End: 2022-12-05

## 2022-12-02 RX ORDER — ETHACRYNIC ACID 25 MG/1
50 TABLET ORAL ONCE
Refills: 0 | Status: COMPLETED | OUTPATIENT
Start: 2022-12-02 | End: 2022-12-02

## 2022-12-02 RX ORDER — HYDRALAZINE HCL 50 MG
10 TABLET ORAL ONCE
Refills: 0 | Status: COMPLETED | OUTPATIENT
Start: 2022-12-02 | End: 2022-12-02

## 2022-12-02 RX ORDER — NITROGLYCERIN 6.5 MG
1 CAPSULE, EXTENDED RELEASE ORAL ONCE
Refills: 0 | Status: COMPLETED | OUTPATIENT
Start: 2022-12-02 | End: 2022-12-02

## 2022-12-02 RX ORDER — POLYETHYLENE GLYCOL 3350 17 G/17G
17 POWDER, FOR SOLUTION ORAL DAILY
Refills: 0 | Status: DISCONTINUED | OUTPATIENT
Start: 2022-12-02 | End: 2022-12-10

## 2022-12-02 RX ADMIN — CLOPIDOGREL BISULFATE 75 MILLIGRAM(S): 75 TABLET, FILM COATED ORAL at 11:40

## 2022-12-02 RX ADMIN — Medication 10 MILLIGRAM(S): at 20:06

## 2022-12-02 RX ADMIN — Medication 1 INCH(S): at 20:07

## 2022-12-02 RX ADMIN — SODIUM CHLORIDE 100 MILLILITER(S): 9 INJECTION, SOLUTION INTRAVENOUS at 07:20

## 2022-12-02 RX ADMIN — Medication 10 MILLIGRAM(S): at 20:07

## 2022-12-02 RX ADMIN — ETHACRYNIC ACID 100 MILLIGRAM(S): 25 TABLET ORAL at 20:06

## 2022-12-02 RX ADMIN — SODIUM CHLORIDE 100 MILLILITER(S): 9 INJECTION, SOLUTION INTRAVENOUS at 11:40

## 2022-12-02 RX ADMIN — POLYETHYLENE GLYCOL 3350 17 GRAM(S): 17 POWDER, FOR SOLUTION ORAL at 11:40

## 2022-12-02 NOTE — PROGRESS NOTE ADULT - SUBJECTIVE AND OBJECTIVE BOX
89yof PMHx of macular degeneration (appears to be legally blind), severe presbycusis, and hypertension presents after 1 week of abdominal distention mild nausea enlarging abdomen went to PMD Dr. Rodriguez having routine blood work done for this issue and sent in today for evaluation after lipase greater than 6000.  Denies previous abdominal surgeries denies constipation diarrhea. Denies recent trauma, fevers, chills, headache, dizziness, nausea, vomiting, dysuria, freq, hematuria, diarrhea, constipation, chest pain, shortness of breath, cough.    MEDICATIONS  (STANDING):  clopidogrel Tablet 75 milliGRAM(s) Oral daily  lactated ringers. 1000 milliLiter(s) (100 mL/Hr) IV Continuous <Continuous>  polyethylene glycol 3350 17 Gram(s) Oral daily    MEDICATIONS  (PRN):          VITALS:   T(C): 36.9 (22 @ 04:20), Max: 36.9 (22 @ 00:01)  HR: 73 (22 @ 04:20) (73 - 116)  BP: 141/61 (22 @ 04:20) (139/74 - 183/75)  RR: 18 (22 @ 04:20) (18 - 20)  SpO2: 92% (22 @ 04:20) (92% - 99%)  Wt(kg): --    PHYSICAL EXAM:  GENERAL: NAD, well-developed  HEAD:  Atraumatic, Normocephalic  EYES: EOMI, PERRLA, conjunctiva and sclera clear  NECK: Supple, No JVD  CHEST/LUNG: Clear to auscultation bilaterally; No wheeze  HEART: Regular rate and rhythm; No murmurs, rubs, or gallops  ABDOMEN: Soft, mild diffuse tender, Nondistended; Bowel sounds present  EXTREMITIES:  2+ Peripheral Pulses, No clubbing, cyanosis, or edema  PSYCH: AAOx3  NEUROLOGY: non-focal  SKIN: No rashes or lesions    LABS:        CBC Full  -  ( 02 Dec 2022 07:00 )  WBC Count : 7.40 K/uL  RBC Count : 3.16 M/uL  Hemoglobin : 9.3 g/dL  Hematocrit : 28.0 %  Platelet Count - Automated : 141 K/uL  Mean Cell Volume : 88.6 fl  Mean Cell Hemoglobin : 29.4 pg  Mean Cell Hemoglobin Concentration : 33.2 gm/dL  Auto Neutrophil # : x  Auto Lymphocyte # : x  Auto Monocyte # : x  Auto Eosinophil # : x  Auto Basophil # : x  Auto Neutrophil % : x  Auto Lymphocyte % : x  Auto Monocyte % : x  Auto Eosinophil % : x  Auto Basophil % : x        137  |  104  |  18  ----------------------------<  59<L>  4.1   |  20<L>  |  0.67    Ca    8.6      02 Dec 2022 07:00    TPro  6.0  /  Alb  2.8<L>  /  TBili  0.6  /  DBili  x   /  AST  19  /  ALT  11  /  AlkPhos  47  12    LIVER FUNCTIONS - ( 02 Dec 2022 07:00 )  Alb: 2.8 g/dL / Pro: 6.0 g/dL / ALK PHOS: 47 U/L / ALT: 11 U/L / AST: 19 U/L / GGT: x             Urinalysis Basic - ( 01 Dec 2022 18:42 )    Color: Yellow / Appearance: Slightly Turbid / S.020 / pH: x  Gluc: x / Ketone: Small  / Bili: Negative / Urobili: Negative   Blood: x / Protein: 100 / Nitrite: Negative   Leuk Esterase: Negative / RBC: 5 /hpf / WBC 8 /HPF   Sq Epi: x / Non Sq Epi: 3 /hpf / Bacteria: Negative      CAPILLARY BLOOD GLUCOSE      POCT Blood Glucose.: 255 mg/dL (01 Dec 2022 15:27)  POCT Blood Glucose.: 62 mg/dL (01 Dec 2022 14:52)      RADIOLOGY & ADDITIONAL TESTS:

## 2022-12-02 NOTE — PROGRESS NOTE ADULT - ASSESSMENT
abdominal pain   elevated lipase   pancreatitis     lipase >3000  CT non con with acute pancreatitis, no evidence of gallstones   triglycerides are normal   CLD  advance diet as tolerated  IVF   trend lipase  pain meds prn  will follow  dw pt and son at bedside     Advanced care planning was discussed with patient and family.  Advanced care planning forms were reviewed and discussed.  Risks, benefits and alternatives of gastroenterologic procedures were discussed in detail and all questions were answered.    30 minutes spent.

## 2022-12-02 NOTE — RAPID RESPONSE TEAM SUMMARY - NSOTHERINTERVENTIONSRRT_GEN_ALL_CORE
Transferred to telemetry unit   Full set of labs sent including cardiac enzymes   External urinary catheter placed for I&O's  Troponins q 6 hours  Called primary attending, continue with current medical management per primary team

## 2022-12-02 NOTE — CHART NOTE - NSCHARTNOTEFT_GEN_A_CORE
Informed by nurse patient O2 saturation 84% s/p ambulation back to bed after using bedside commode. Saw and evaluated patient at bedside. Patient A&Ox4 - endorses generalized fatigue - otherwise denies lightheadedness, dizziness, chest pain, palpitations, SOB, N/V/D, blurred vision.    Vital Signs Last 24 Hrs  T(F): 98.3 (02 Dec 2022 10:11)  HR: 90 (02 Dec 2022 10:11) (73 - 116)  BP: 142/71 (02 Dec 2022 10:11) (139/74 - 183/75)  SpO2: 92% (02 Dec 2022 10:11) (84% - 99%)    Parameters below as of 02 Dec 2022 10:11  Patient On (Oxygen Delivery Method): nasal cannula  O2 Flow (L/min): 2    CT A/P Non-con (12.01.22 @17.27)  IMPRESSION:  Acute interstitial pancreatitis. No pancreatic or peripancreatic   collections.    Groundglass opacities, interlobular septal thickening and bronchial wall   thickening at the lung bases, which could be related to mild pulmonary   edema or infection.    Physical Exam:  CONSTITUTIONAL: NAD, well-developed  RESPIRATORY: Normal respiratory effort; lungs are clear to auscultation bilaterally  CARDIOVASCULAR: Regular rate and rhythm, normal S1 and S2, no murmur/rub/gallop; No lower extremity edema; Peripheral pulses are 2+ bilaterally  ABDOMEN: Nontender to palpation, normoactive bowel sounds, no rebound/guarding; No hepatosplenomegaly  PSYCH: A+O to person, place, and time; phasic     Assessment:  89 yo female h/o macular degeneration, HTN, here with pancreatitis. GI following - advanced to clear liquid diet this AM. New findings of hypoxia on Room air s/p ambulation to bed after using bedside commode. Now on 2L NC satting 92%. Of note, CT A/P 12/1 demonstrated <<groundglass opacities, interlobular septal thickening and bronchial wall thickening at the lung bases, which could be related to mild pulmonary edema or infection.>>     Plan:  - Currently on 2L NC  - Urgent CXR  - Pulm consulted    - Will continue to monitor patient closely.  - Discussed with attending - agrees with plan above; recommends holding off on abx for now pending pulm recs and CXR results       Desire DALLAS  Dept of Medicine  [place]ra [phone]

## 2022-12-02 NOTE — RAPID RESPONSE TEAM SUMMARY - NSMEDICATIONSRRT_GEN_ALL_CORE
ethacrynic acid 50mg IVPB x 2 Pt with sulfa allergy, UTD reaction, held loop diuretics, instead used ethacrynic acid  ethacrynic acid 50mg IVPB x 2  nitro paste   hydralazine 10mg IV x 2  ondansetron 4mg IV  x 1

## 2022-12-02 NOTE — PATIENT PROFILE ADULT - FALL HARM RISK - HARM RISK INTERVENTIONS
Assistance with ambulation/Assistance OOB with selected safe patient handling equipment/Communicate Risk of Fall with Harm to all staff/Discuss with provider need for PT consult/Monitor gait and stability/Reinforce activity limits and safety measures with patient and family/Tailored Fall Risk Interventions/Visual Cue: Yellow wristband and red socks/Bed in lowest position, wheels locked, appropriate side rails in place/Call bell, personal items and telephone in reach/Instruct patient to call for assistance before getting out of bed or chair/Non-slip footwear when patient is out of bed/[place] to call system/Physically safe environment - no spills, clutter or unnecessary equipment/Purposeful Proactive Rounding/Room/bathroom lighting operational, light cord in reach

## 2022-12-02 NOTE — PATIENT PROFILE ADULT - FUNCTIONAL SCREEN CURRENT LEVEL: COMMUNICATION, MLM
What Is The Reason For Today's Visit?: History of Squamous Cell Carcinoma
How Many Sccs Have You Had?: more than one
When Was Your Last Cancer Diagnosed?: 2015
0 = understands/communicates without difficulty

## 2022-12-02 NOTE — CONSULT NOTE ADULT - ATTENDING COMMENTS
Ms. Adrian reported no chest pain or dyspnea at the time of my evaluation. TTE shows LV apical WMA. Start statin if no contraindication. Close monitoring of volume status for signs of fluid overload and PRN diuresis weighting benefits and risk with concomitant pancreatitis.    50 minutes spent on total patient encounter. More than 50% of the encounter was spent counseling and/or coordination care. The necessity of the time spent on this encounter was due to: time spent evaluating the patient as well as time spent reviewing labs, imaging, and discussing the case with a multidisciplinary team.

## 2022-12-02 NOTE — CHART NOTE - NSCHARTNOTEFT_GEN_A_CORE
Patient is a 89y old  Female who presents with a chief complaint of     -Pt transferred to tele unit post RRT earlier tonight  Pt seen at bedside, alert, oriented X 3, with BiPAP in place 10/5/55%   Pt is not tachypneic or dyspneic, denied having cp and no SOB, no abdominal pain, n/v  Pt conversing easily with provider and her son, stating "I just want to go home"  During RRT pt received 2 doses of Edecrine 50 mg IVPB. Pt urine output has been steady      Vital Signs Last 24 Hrs  T(C): 36.8 (02 Dec 2022 11:24), Max: 36.9 (02 Dec 2022 00:01)  T(F): 98.3 (02 Dec 2022 11:24), Max: 98.4 (02 Dec 2022 00:01)  HR: 90 (02 Dec 2022 11:24) (73 - 92)  BP: 142/71 (02 Dec 2022 11:24) (139/74 - 142/71)  BP(mean): 3 (02 Dec 2022 00:01) (3 - 3)  RR: 18 (02 Dec 2022 11:24) (18 - 18)  SpO2: 92% (02 Dec 2022 10:11) (84% - 93%)    Parameters below as of 02 Dec 2022 10:11  Patient On (Oxygen Delivery Method): nasal cannula  O2 Flow (L/min): 2      Labs:                          11.3   15.22 )-----------( 229      ( 02 Dec 2022 19:22 )             34.5     12-02    135  |  101  |  16  ----------------------------<  136<H>  3.6   |  18<L>  |  0.71    Ca    8.9      02 Dec 2022 19:22    TPro  7.0  /  Alb  3.3  /  TBili  0.8  /  DBili  x   /  AST  28  /  ALT  13  /  AlkPhos  62  12-02    CARDIAC MARKERS ( 02 Dec 2022 19:22 )  x     / x     / 341 U/L / x     / 9.4 ng/mL      ABG - ( 02 Dec 2022 19:13 )  pH, Arterial: 7.36  pH, Blood: x     /  pCO2: 33    /  pO2: 75    / HCO3: 19    / Base Excess: -6.0  /  SaO2: 97.1      Troponin T, High Sensitivity (12.02.22 @ 19:22)    Troponin T, High Sensitivity Result: 99      Radiology:  CXR: Prelim  INTERPRETATION:  B/l patchy perihilar opacities with similar appearing   b/l pleural effusions      Physical Exam:  General: WN/WD NAD  Neurology: A&Ox3, nonfocal, SANTOS x 4  Head:  Normocephalic, atraumatic  Respiratory: Bi basilar coarse rales B/L L > R                     Apices CTA; no wheezing or rhonchi appreciated                     Observed with good inspiratory effort  CV: RRR, S1S2, no murmur, tachycardic  Abdominal: Soft, Non tender, non distended, + BS  MSK: Trace edema B/L + peripheral pulses, FROM all 4 extremity    Assessment & Plan:  HPI:  89yof PMHx of macular degeneration (appears to be legally blind), severe presbycusis, and hypertension presents after 1 week of abdominal distention mild nausea enlarging abdomen went to PMD Dr. Rodriguez having routine blood work done for this issue and sent in today for evaluation after lipase greater than 6000.  Denies previous abdominal surgeries denies constipation diarrhea. Denies recent trauma, fevers, chills, headache, dizziness, nausea, vomiting, dysuria, freq, hematuria, diarrhea, constipation, chest pain, shortness of breath, cough. (01 Dec 2022 16:31)   Pt is s/p RRT for hypoxia and sob transferred to telemetry unit, now markedly improved on NIPPV BiPAP 10/5/55%  Currently hemodynamically stable, tolerating BiPAP    Son requested to speak to provider regarding Goals of Care, and stated that he has a MOLST form filled out previously, poss. within the past 10 yrs. He wanted to ensure that his mother only be made comfortable, no chest compressions or mechanical ventilatory support. He is agreeable to IVF, IV meds, Vasopressors, and no NGT feeding.   Son Geo Tyson is her Health Care Agent, and signed MOLST tonight. Pt has macular degeneration, and Mcgrath, and her son signed on her behalf. DNR/DNI order placed in chart    PLAN:  >Continue to monitor  >Maintain BiPAP at 10/5/55%                 Will keep on overnight, and wean in AM to Nasal canula  >Consider giving additional Edecrine for diuretic effect if SOB recurs  >DNR/DNI  >Will endorse to dayteam in AM to follow up

## 2022-12-02 NOTE — CONSULT NOTE ADULT - PROBLEM SELECTOR RECOMMENDATION 9
Pt with episode of hypoxia to mid 80s with ambulation, resolved with rest and 2LNC  -Seen on 2LNC, sats 99%  -CT A/P with subtle GGO, CXR clear  -Keep sats >90% with O2 PRN  -Check LE duplex to r/o DVT  -Would monitor off ABX.

## 2022-12-02 NOTE — RAPID RESPONSE TEAM SUMMARY - NSADDTLFINDINGSRRT_GEN_ALL_CORE
Upon arrival to RRT, pt was tachypneic to 40's, hypoxic (spO2 82% on room air), tachycardic (ST to 130's). Pt reports she feels nauseas, SOB, and uncomfortable. Pt remained alert and oriented x 4, speaking in full sentences, but very restless and agitated. Pt was placed on NRB with improvement in spO2 to 94% however pt remained tachypneic and restless. On exam, diffuse crackles auscultated in all fields. EKG done at bedside showed sinus tach w/ RBBB and ST depressions in anterior leads. Pt denied chest pain. Cardiology called.    Upon arrival to RRT, pt was tachypneic to 40's, hypoxemic (spO2 82% on room air), tachycardic (ST to 130's), hypertensive (see RRT flowsheet for complete set of vital signs). Pt reported she felt nauseas, SOB, and uncomfortable. Pt remained alert and oriented x 4, speaking in full sentences, but very restless and agitated. Pt was placed on NRB with improvement in spO2 to 94% however pt remained tachypneic and restless. On exam, diffuse crackles auscultated in all fields. Pt likely in flash pulmonary edema secondary to fluid overload. EKG done at bedside showed sinus tach w/ RBBB and ST depressions in anterior leads. Pt denied chest pain. Cardiology called and in agreement, likely demand ischemia secondary to flash pulmonary edema. Pt placed on bipap with improvement in respiratory rate and spO2. Full set of labs sent. EKG repeated. Transferred pt to tele monitoring unit. Pt remains at baseline, states she feels better, appears comfortable, tolerating bipap well. Spoke with pt's daughter and son on phone, son on the way to hospital, per son, pt would not want heroic measures such as CPR done, pending [facility] conversation with son and pt upon his arrival.

## 2022-12-02 NOTE — PHYSICAL THERAPY INITIAL EVALUATION ADULT - PERTINENT HX OF CURRENT PROBLEM, REHAB EVAL
89y.o w/ PMHx of macular degeneration, severe presbycusis, and hypertension presents after 1 wk of abdominal distention mild nausea enlarging abdomen went to PMD Dr. Rodriguez having routine blood work done for this issue & sent in today for evaluation after lipase greater than 6000.  Denies previous abdominal surgeries denies constipation diarrhea. Denies recent trauma, fevers, chills, headache, dizziness, nausea, vomiting, dysuria, freq, hematuria, diarrhea, constipation, chest pain, shortness of breath, cough.

## 2022-12-02 NOTE — CONSULT NOTE ADULT - SUBJECTIVE AND OBJECTIVE BOX
PULMONARY CONSULT    HPI: 89 y/o F with PMH of macular degeneration (appears to be legally blind), severe presbycusis, and HTN. Presents with abdominal distention, nausea, x1 week. Labs significant for elevated lipase, GI following for pancreatitis. Pulmonary called to consult for episode of hypoxia to 84% this AM after ambulating on RA. Sats on RA at rest have been mid 90s per flowsheet. CT A/P with GGO at lung bases.     PAST MEDICAL & SURGICAL HISTORY:  GERD (gastroesophageal reflux disease)  Arthritis  Macular degeneration  Hard of hearing, bilateral  Visual impairment  S/P hip replacement, left    Allergies  bacitracin (Unknown)  Flonase (Unknown)  fluorescein (Unknown)  iodine (Anaphylaxis)  iodine topical (Unknown)  latex (Unknown)  Levaquin (Unknown)  levofloxacin (Unknown)  morphine (Unknown)  Neosporin (Unknown)  shellfish (Anaphylaxis)  shellfish (Unknown)  sulfa drugs (Unknown)    FAMILY HISTORY:    Social history:     Review of Systems:  CONSTITUTIONAL: No fever, chills, or fatigue  EYES: No eye pain, visual disturbances, or discharge  ENMT:  No difficulty hearing, tinnitus, vertigo; No sinus or throat pain  NECK: No pain or stiffness  RESPIRATORY: Per above  CARDIOVASCULAR: No chest pain, palpitations, dizziness, or leg swelling  GASTROINTESTINAL: No abdominal or epigastric pain. No nausea, vomiting, or hematemesis; No diarrhea or constipation. No melena or hematochezia.  GENITOURINARY: No dysuria, frequency, hematuria, or incontinence  NEUROLOGICAL: No headaches, memory loss, loss of strength, numbness, or tremors  SKIN: No itching, burning, rashes, or lesions   MUSCULOSKELETAL: No joint pain or swelling; No muscle, back, or extremity pain  PSYCHIATRIC: No depression, anxiety, mood swings, or difficulty sleeping    Medications:  MEDICATIONS  (STANDING):  clopidogrel Tablet 75 milliGRAM(s) Oral daily  lactated ringers. 1000 milliLiter(s) (100 mL/Hr) IV Continuous <Continuous>  polyethylene glycol 3350 17 Gram(s) Oral daily    Vital Signs Last 24 Hrs  T(C): 36.9 (02 Dec 2022 04:20), Max: 36.9 (02 Dec 2022 00:01)  T(F): 98.4 (02 Dec 2022 04:20), Max: 98.4 (02 Dec 2022 00:01)  HR: 73 (02 Dec 2022 04:20) (73 - 116)  BP: 141/61 (02 Dec 2022 04:20) (139/74 - 183/75)  BP(mean): 3 (02 Dec 2022 00:01) (3 - 3)  RR: 18 (02 Dec 2022 04:20) (18 - 20)  SpO2: 92% (02 Dec 2022 10:11) (84% - 99%)    Parameters below as of 02 Dec 2022 10:11  Patient On (Oxygen Delivery Method): nasal cannula  O2 Flow (L/min): 2    VBG pH 7.41  @ 12:15  VBG pCO2 33  @ 12:15  VBG O2 sat 99.7  @ 12:15  VBG lactate 1.5  @ 12:15    LABS:                        9.3    7.40  )-----------( 141      ( 02 Dec 2022 07:00 )             28.0     12-    137  |  104  |  18  ----------------------------<  59<L>  4.1   |  20<L>  |  0.67    Ca    8.6      02 Dec 2022 07:00    TPro  6.0  /  Alb  2.8<L>  /  TBili  0.6  /  DBili  x   /  AST  19  /  ALT  11  /  AlkPhos  47  12-02    CAPILLARY BLOOD GLUCOSE  POCT Blood Glucose.: 255 mg/dL (01 Dec 2022 15:27)    Urinalysis Basic - ( 01 Dec 2022 18:42 )    Color: Yellow / Appearance: Slightly Turbid / S.020 / pH: x  Gluc: x / Ketone: Small  / Bili: Negative / Urobili: Negative   Blood: x / Protein: 100 / Nitrite: Negative   Leuk Esterase: Negative / RBC: 5 /hpf / WBC 8 /HPF   Sq Epi: x / Non Sq Epi: 3 /hpf / Bacteria: Negative    Physical Examination:    General: No acute distress.      HEENT: Pupils equal, reactive to light.  Symmetric.    PULM: Clear to auscultation bilaterally, no significant sputum production    CVS: S1, S2    ABD: Soft, nondistended, nontender, normoactive bowel sounds, no masses    EXT: No edema, nontender    SKIN: Warm and well perfused, no rashes noted.    NEURO: Alert, oriented, interactive, nonfocal    RADIOLOGY REVIEWED    CT chest:< from: CT Abdomen and Pelvis No Cont (22 @ 17:27) >  FINDINGS:  LOWER CHEST: Groundglass opacities at the lung bases with mild   interlobular septal thickening and bronchial wall thickening.    LIVER: Within normal limits.  BILE DUCTS: Normal caliber.  GALLBLADDER: Within normal limits.  SPLEEN: Within normal limits.  PANCREAS: Mild peripancreatic inflammatory stranding. No pancreatic or   peripancreatic collections.  ADRENALS: Within normal limits.  KIDNEYS/URETERS: Bilateral renal cysts measuring up to 6.6 cm in the left   kidney. No hydronephrosis or obstructive renal calculi.    BLADDER: Within normal limits.  REPRODUCTIVE ORGANS: Uterus and adnexa within normal limits.    BOWEL: Small hiatal hernia. Sigmoid diverticulosis without evidence of   acute diverticulitis. No acute appendicitis. No bowel obstruction.  PERITONEUM: No ascites.  VESSELS: Atherosclerotic changes.  RETROPERITONEUM/LYMPH NODES: No lymphadenopathy.  ABDOMINAL WALL: Within normal limits.  BONES: Diffuse osteopenia. Stable moderate compression fracture of L4 and   moderate to severe compression fracture of T12. Status post ORIF of the   right hip. No acute osseous abnormality.    IMPRESSION:  Acute interstitial pancreatitis. No pancreatic or peripancreatic   collections.    Groundglass opacities, interlobular septal thickening and bronchial wall   thickening at the lung bases, which could be related to mild pulmonary   edema or infection.    --- End of Report ---    < end of copied text >   PULMONARY CONSULT    HPI: 89 y/o F with PMH of macular degeneration (appears to be legally blind), severe presbycusis, and HTN. Presents with abdominal distention, nausea, x1 week. Labs significant for elevated lipase, GI following for pancreatitis. Pulmonary called to consult for episode of hypoxia to 84% this AM after ambulating on RA. Sats on RA at rest have been mid 90s per flowsheet. CT A/P with GGO at lung bases. CXR grossly clear. Non smoker, denies hx of lung disease. Denies CP, SOB, pleuritic chest pain. O2 sats 99% on 2LNC.     PAST MEDICAL & SURGICAL HISTORY:  GERD (gastroesophageal reflux disease)  Arthritis  Macular degeneration  Hard of hearing, bilateral  Visual impairment  S/P hip replacement, left    Allergies  bacitracin (Unknown)  Flonase (Unknown)  fluorescein (Unknown)  iodine (Anaphylaxis)  iodine topical (Unknown)  latex (Unknown)  Levaquin (Unknown)  levofloxacin (Unknown)  morphine (Unknown)  Neosporin (Unknown)  shellfish (Anaphylaxis)  shellfish (Unknown)  sulfa drugs (Unknown)    FAMILY HISTORY: non contributory    Social history: non smoker    Review of Systems:  CONSTITUTIONAL: No fever, chills, or fatigue  EYES: No eye pain, visual disturbances, or discharge  ENMT:  No difficulty hearing, tinnitus, vertigo; No sinus or throat pain  NECK: No pain or stiffness  RESPIRATORY: Per above  CARDIOVASCULAR: No chest pain, palpitations, dizziness, or leg swelling  GASTROINTESTINAL: No abdominal or epigastric pain. No nausea, vomiting, or hematemesis; No diarrhea or constipation. No melena or hematochezia.  GENITOURINARY: Per above  NEUROLOGICAL: No headaches, memory loss, loss of strength, numbness, or tremors  SKIN: No itching, burning, rashes, or lesions   MUSCULOSKELETAL: No joint pain or swelling; No muscle, back, or extremity pain  PSYCHIATRIC: No depression, anxiety, mood swings, or difficulty sleeping    Medications:  MEDICATIONS  (STANDING):  clopidogrel Tablet 75 milliGRAM(s) Oral daily  lactated ringers. 1000 milliLiter(s) (100 mL/Hr) IV Continuous <Continuous>  polyethylene glycol 3350 17 Gram(s) Oral daily    Vital Signs Last 24 Hrs  T(C): 36.9 (02 Dec 2022 04:20), Max: 36.9 (02 Dec 2022 00:01)  T(F): 98.4 (02 Dec 2022 04:20), Max: 98.4 (02 Dec 2022 00:01)  HR: 73 (02 Dec 2022 04:20) (73 - 116)  BP: 141/61 (02 Dec 2022 04:20) (139/74 - 183/75)  BP(mean): 3 (02 Dec 2022 00:01) (3 - 3)  RR: 18 (02 Dec 2022 04:20) (18 - 20)  SpO2: 92% (02 Dec 2022 10:11) (84% - 99%)    Parameters below as of 02 Dec 2022 10:11  Patient On (Oxygen Delivery Method): nasal cannula  O2 Flow (L/min): 2    VBG pH 7.41  @ 12:15  VBG pCO2 33  @ 12:15  VBG O2 sat 99.7  @ 12:15  VBG lactate 1.5  @ 12:15    LABS:                        9.3    7.40  )-----------( 141      ( 02 Dec 2022 07:00 )             28.0     12    137  |  104  |  18  ----------------------------<  59<L>  4.1   |  20<L>  |  0.67    Ca    8.6      02 Dec 2022 07:00    TPro  6.0  /  Alb  2.8<L>  /  TBili  0.6  /  DBili  x   /  AST  19  /  ALT  11  /  AlkPhos  47  12-    CAPILLARY BLOOD GLUCOSE  POCT Blood Glucose.: 255 mg/dL (01 Dec 2022 15:27)    Urinalysis Basic - ( 01 Dec 2022 18:42 )    Color: Yellow / Appearance: Slightly Turbid / S.020 / pH: x  Gluc: x / Ketone: Small  / Bili: Negative / Urobili: Negative   Blood: x / Protein: 100 / Nitrite: Negative   Leuk Esterase: Negative / RBC: 5 /hpf / WBC 8 /HPF   Sq Epi: x / Non Sq Epi: 3 /hpf / Bacteria: Negative    Physical Examination:    General: No acute distress.      HEENT: Pupils equal, reactive to light.  Symmetric.    PULM: Clear to auscultation bilaterally, no significant sputum production    CVS: S1, S2    ABD: Soft, nondistended, nontender, normoactive bowel sounds, no masses    EXT: No edema, nontender    SKIN: Warm and well perfused, no rashes noted.    NEURO: Alert, oriented, interactive, nonfocal    RADIOLOGY REVIEWED    CT chest:< from: CT Abdomen and Pelvis No Cont (22 @ 17:27) >  FINDINGS:  LOWER CHEST: Groundglass opacities at the lung bases with mild   interlobular septal thickening and bronchial wall thickening.    LIVER: Within normal limits.  BILE DUCTS: Normal caliber.  GALLBLADDER: Within normal limits.  SPLEEN: Within normal limits.  PANCREAS: Mild peripancreatic inflammatory stranding. No pancreatic or   peripancreatic collections.  ADRENALS: Within normal limits.  KIDNEYS/URETERS: Bilateral renal cysts measuring up to 6.6 cm in the left   kidney. No hydronephrosis or obstructive renal calculi.    BLADDER: Within normal limits.  REPRODUCTIVE ORGANS: Uterus and adnexa within normal limits.    BOWEL: Small hiatal hernia. Sigmoid diverticulosis without evidence of   acute diverticulitis. No acute appendicitis. No bowel obstruction.  PERITONEUM: No ascites.  VESSELS: Atherosclerotic changes.  RETROPERITONEUM/LYMPH NODES: No lymphadenopathy.  ABDOMINAL WALL: Within normal limits.  BONES: Diffuse osteopenia. Stable moderate compression fracture of L4 and   moderate to severe compression fracture of T12. Status post ORIF of the   right hip. No acute osseous abnormality.    IMPRESSION:  Acute interstitial pancreatitis. No pancreatic or peripancreatic   collections.    Groundglass opacities, interlobular septal thickening and bronchial wall   thickening at the lung bases, which could be related to mild pulmonary   edema or infection.    --- End of Report ---    < end of copied text >

## 2022-12-02 NOTE — CONSULT NOTE ADULT - ASSESSMENT
89 y/o F with PMH of macular degeneration (appears to be legally blind), severe presbycusis, and HTN. Presents with abdominal distention, nausea, x1 week. Labs significant for elevated lipase, GI following for pancreatitis. Pulmonary called to consult for episode of hypoxia.

## 2022-12-02 NOTE — PROVIDER CONTACT NOTE (CRITICAL VALUE NOTIFICATION) - RECOMMENDATIONS
Provider will relay information and recommendations to new nurse taking the pt since the pt had a rapid and will be transferred to another floor.

## 2022-12-02 NOTE — PROGRESS NOTE ADULT - SUBJECTIVE AND OBJECTIVE BOX
[facility]  Keegan Cox PA-C  [address] [place] [address]  [phone]      INTERVAL HPI/OVERNIGHT EVENTS:  pt seen and examined, events noted  reports minimal abdominal pain     MEDICATIONS  (STANDING):  clopidogrel Tablet 75 milliGRAM(s) Oral daily  lactated ringers. 1000 milliLiter(s) (100 mL/Hr) IV Continuous <Continuous>  polyethylene glycol 3350 17 Gram(s) Oral daily    MEDICATIONS  (PRN):      Allergies    bacitracin (Unknown)  Flonase (Unknown)  fluorescein (Unknown)  iodine (Anaphylaxis)  iodine topical (Unknown)  latex (Unknown)  Levaquin (Unknown)  levofloxacin (Unknown)  morphine (Unknown)  Neosporin (Unknown)  shellfish (Anaphylaxis)  shellfish (Unknown)  sulfa drugs (Unknown)    Intolerances        ROS:   General:  No wt loss, fevers, chills, night sweats, fatigue,   Eyes:  Good vision, no reported pain  ENT:  No sore throat, pain, runny nose, dysphagia  CV:  No pain, palpitations, hypo/hypertension  Resp:  No dyspnea, cough, tachypnea, wheezing  GI:  No pain, No nausea, No vomiting, No diarrhea, No constipation, No weight loss, No fever, No pruritis, No rectal bleeding, No tarry stools, No dysphagia,  :  No pain, bleeding, incontinence, nocturia  Muscle:  No pain, weakness  Neuro:  No weakness, tingling, memory problems  Psych:  No fatigue, insomnia, mood problems, depression  Endocrine:  No polyuria, polydipsia, cold/heat intolerance  Heme:  No petechiae, ecchymosis, easy bruisability  Skin:  No rash, tattoos, scars, edema      PHYSICAL EXAM:   Vital Signs:  Vital Signs Last 24 Hrs  T(C): 36.8 (02 Dec 2022 11:24), Max: 36.9 (02 Dec 2022 00:01)  T(F): 98.3 (02 Dec 2022 11:24), Max: 98.4 (02 Dec 2022 00:01)  HR: 90 (02 Dec 2022 11:24) (73 - 116)  BP: 142/71 (02 Dec 2022 11:24) (139/74 - 183/75)  BP(mean): 3 (02 Dec 2022 00:01) (3 - 3)  RR: 18 (02 Dec 2022 11:24) (18 - 20)  SpO2: 92% (02 Dec 2022 10:11) (84% - 99%)    Parameters below as of 02 Dec 2022 10:11  Patient On (Oxygen Delivery Method): nasal cannula  O2 Flow (L/min): 2    Daily Height in cm: 154.94 (02 Dec 2022 00:01)    Daily     nad    alert  frail  non toxic  soft, nt  no edema    LABS:                        9.3    7.40  )-----------( 141      ( 02 Dec 2022 07:00 )             28.0     12    137  |  104  |  18  ----------------------------<  59<L>  4.1   |  20<L>  |  0.67    Ca    8.6      02 Dec 2022 07:00    TPro  6.0  /  Alb  2.8<L>  /  TBili  0.6  /  DBili  x   /  AST  19  /  ALT  11  /  AlkPhos  47  12-02      Urinalysis Basic - ( 01 Dec 2022 18:42 )    Color: Yellow / Appearance: Slightly Turbid / S.020 / pH: x  Gluc: x / Ketone: Small  / Bili: Negative / Urobili: Negative   Blood: x / Protein: 100 / Nitrite: Negative   Leuk Esterase: Negative / RBC: 5 /hpf / WBC 8 /HPF   Sq Epi: x / Non Sq Epi: 3 /hpf / Bacteria: Negative        RADIOLOGY & ADDITIONAL TESTS:  < from: CT Abdomen and Pelvis No Cont (22 @ 17:27) >    ACC: [id number] EXAM:  CT ABDOMEN AND PELVIS                          PROCEDURE DATE:  2022          INTERPRETATION:  CLINICAL INFORMATION: Elevated lipase. Evaluate for   pancreatic pathology.    COMPARISON: CT chest 2021 CT abdomen/pelvis 8/10/2012.    CONTRAST/COMPLICATIONS:  IV Contrast: NONE  Oral Contrast: NONE  Complications: None reported at time of study completion    PROCEDURE:  CT of the Abdomen and Pelvis was performed.  Sagittal and coronal reformats were performed.    FINDINGS:  LOWER CHEST: Groundglass opacities at the lung bases with mild   interlobular septal thickening and bronchial wall thickening.    LIVER: Within normal limits.  BILE DUCTS: Normal caliber.  GALLBLADDER: Within normal limits.  SPLEEN: Within normal limits.  PANCREAS: Mild peripancreatic inflammatory stranding. No pancreatic or   peripancreatic collections.  ADRENALS: Within normal limits.  KIDNEYS/URETERS: Bilateral renal cysts measuring up to 6.6 cm in the left   kidney. No hydronephrosis or obstructive renal calculi.    BLADDER: Within normal limits.  REPRODUCTIVE ORGANS: Uterus and adnexa within normal limits.    BOWEL: Small hiatal hernia. Sigmoid diverticulosis without evidence of   acute diverticulitis. No acute appendicitis. No bowel obstruction.  PERITONEUM: No ascites.  VESSELS: Atherosclerotic changes.  RETROPERITONEUM/LYMPH NODES: No lymphadenopathy.  ABDOMINAL WALL: Within normal limits.  BONES: Diffuse osteopenia. Stable moderate compression fracture of L4 and   moderate to severe compression fracture of T12. Status post ORIF of the   right hip. No acute osseous abnormality.    IMPRESSION:  Acute interstitial pancreatitis. No pancreatic or peripancreatic   collections.    Groundglass opacities, interlobular septal thickening and bronchial wall   thickening at the lung bases, which could be related to mild pulmonary   edema or infection.    --- End of Report ---            CEE RAGLAND MD; Attending Radiologist  This document has been electronically signed. Dec  1 2022  5:38PM    < end of copied text >

## 2022-12-02 NOTE — CONSULT NOTE ADULT - SUBJECTIVE AND OBJECTIVE BOX
CARDIOLOGY FELLOW CONSULT NOTE    HPI:  89yof PMHx of macular degeneration (appears to be legally blind), severe presbycusis, and hypertension presents after 1 week of abdominal distention mild nausea enlarging abdomen went to PMD Dr. Rodriguez having routine blood work done for this issue and sent in today for evaluation after lipase greater than 6000.  Denies previous abdominal surgeries denies constipation diarrhea. Denies recent trauma, fevers, chills, headache, dizziness, nausea, vomiting, dysuria, freq, hematuria, diarrhea, constipation, chest pain, shortness of breath, cough. (01 Dec 2022 16:31)    PMHx:   Seizure disorder  GERD (gastroesophageal reflux disease)  Arthritis  Macular degeneration  Hard of hearing, bilateral  Visual impairment    PSHx:   No Past Surgical History  S/P hip replacement, left    Allergies:  bacitracin (Unknown)  Flonase (Unknown)  fluorescein (Unknown)  iodine (Anaphylaxis)  iodine topical (Unknown)  latex (Unknown)  Levaquin (Unknown)  levofloxacin (Unknown)  morphine (Unknown)  Neosporin (Unknown)  shellfish (Anaphylaxis)  shellfish (Unknown)  sulfa drugs (Unknown)    Home Meds:    Current Medications:   clopidogrel Tablet 75 milliGRAM(s) Oral daily  ethacrynic acid IVPB 50 milliGRAM(s) IV Intermittent once  hydrALAZINE Injectable 10 milliGRAM(s) IV Push once  hydrALAZINE Injectable 10 milliGRAM(s) IV Push once  lactated ringers. 1000 milliLiter(s) IV Continuous <Continuous>  polyethylene glycol 3350 17 Gram(s) Oral daily    REVIEW OF SYSTEMS: ROS neg unless stated above    Physical Exam:  T(F): 98.3 (-), Max: 98.4 ()  HR: 90 (-) (73 - 92)  BP: 142/71 (-) (139/74 - 142/71)  RR: 18 (-)  SpO2: 92% (-)  GENERAL: No acute distress, well-developed  HEAD:  Atraumatic, Normocephalic  ENT: EOMI, PERRLA, conjunctiva and sclera clear, Neck supple, No JVD, moist mucosa  CHEST/LUNG: Clear to auscultation bilaterally; No wheeze, equal breath sounds bilaterally   BACK: No spinal tenderness  HEART: Regular rate and rhythm; No murmurs, rubs, or gallops  ABDOMEN: Soft, Nontender, Nondistended; Bowel sounds present  EXTREMITIES:  No clubbing, cyanosis, or edema  PSYCH: Nl behavior, nl affect  NEUROLOGY: AAOx3, non-focal, cranial nerves intact  SKIN: Normal color, No rashes or lesions  LINES:    ECG: sinus rhythm, asymmetric TWI in precordial leads V1-V3, no significant DOMINIQUE    Echo:     CXR:  bilateral fluffy infiltrates with cephalization     Labs: Personally reviewed                        9.3    7.40  )-----------( 141      ( 02 Dec 2022 07:00 )             28.0     12    137  |  104  |  18  ----------------------------<  59<L>  4.1   |  20<L>  |  0.67    Ca    8.6      02 Dec 2022 07:00    TPro  6.0  /  Alb  2.8<L>  /  TBili  0.6  /  DBili  x   /  AST  19  /  ALT  11  /  AlkPhos  47      Total Cholesterol: 236  LDL: --  HDL: 56  T   CARDIOLOGY FELLOW CONSULT NOTE    HPI:  89yof PMHx of macular degeneration (appears to be legally blind), severe presbycusis, and hypertension presents after 1 week of abdominal distention mild nausea enlarging abdomen went to PMD Dr. Rodriguez having routine blood work done for this issue and sent in today for evaluation after lipase greater than 6000.  Denies previous abdominal surgeries denies constipation diarrhea. Denies recent trauma, fevers, chills, headache, dizziness, nausea, vomiting, dysuria, freq, hematuria, diarrhea, constipation, chest pain, shortness of breath, cough.    Interval Events:  GI asked to consult for pancreatitis. lipase >6000. CT confirmed pancreatitis, no gallstones. GGO in lungs  Made NPO and receiving IVF.  Pulm consulted for new hypoxia recently, recommended duplexes which showed no DVT  RRT called by primary team for "new MI". I arrived at bedside where patient is without chest pain but is dyspneic, on supplemental O2 through NRB. BP is at baseline 130s-140s systolic. EKG with TWI in precordial V1-V3 leads, but incomplete RBBB as well is appreciated.   ABG with 7.36|33 | 75|19  XR with interval worsening of pulmonary edema   I was informed by ACP that patient has been coughing up blood tinged sputum, mostly pink and frothy. Hgb 11 >( but could be dilutional in the setting of fluid administration as all cell lines are down).  Patient is full code.     PMHx:   Seizure disorder  GERD (gastroesophageal reflux disease)  Arthritis  Macular degeneration  Hard of hearing, bilateral  Visual impairment    PSHx:   No Past Surgical History  S/P hip replacement, left    Allergies:  bacitracin (Unknown)  Flonase (Unknown)  fluorescein (Unknown)  iodine (Anaphylaxis)  iodine topical (Unknown)  latex (Unknown)  Levaquin (Unknown)  levofloxacin (Unknown)  morphine (Unknown)  Neosporin (Unknown)  shellfish (Anaphylaxis)  shellfish (Unknown)  sulfa drugs (Unknown)    Current Medications:   clopidogrel Tablet 75 milliGRAM(s) Oral daily  ethacrynic acid IVPB 50 milliGRAM(s) IV Intermittent once  hydrALAZINE Injectable 10 milliGRAM(s) IV Push once  hydrALAZINE Injectable 10 milliGRAM(s) IV Push once  lactated ringers. 1000 milliLiter(s) IV Continuous <Continuous>  polyethylene glycol 3350 17 Gram(s) Oral daily    REVIEW OF SYSTEMS: ROS neg unless stated above    Physical Exam:  T(F): 98.3 (-), Max: 98.4 (12-)  HR: 90 (-) (73 - 92)  BP: 142/71 (12-02) (139/74 - 142/71)  RR: 18 (12-02)  SpO2: 92% (-)  GENERAL: distress, on NRB. Frail, elderly  HEAD:  Atraumatic, Normocephalic  ENT: EOMI, PERRLA, conjunctiva and sclera clear, Neck supple, No JVD, moist mucosa  CHEST/LUNG: diffuse crackles anteriorly   BACK: No spinal tenderness  HEART: Regular rate and rhythm; No murmurs, rubs, or gallops  ABDOMEN: Soft, Nontender, Nondistended; Bowel sounds present  EXTREMITIES:  No clubbing, cyanosis, or edema  PSYCH: Nl behavior, nl affect  NEUROLOGY: AAOx3, non-focal, cranial nerves intact  SKIN: Normal color, No rashes or lesions    ECG: sinus rhythm, asymmetric TWI in precordial leads V1-V3, no significant DOMINIQUE  q waves in V3-V4    CXR:  bilateral fluffy infiltrates with cephalization     CT:  Acute interstitial pancreatitis. No pancreatic or peripancreatic   collections.  Groundglass opacities, interlobular septal thickening and bronchial wall   thickening at the lung bases, which could be related to mild pulmonary   edema or infection.    Duplexes: Neg    Echo:  Pending    Labs: Personally reviewed                        9.3    7.40  )-----------( 141      ( 02 Dec 2022 07:00 )             28.0     12    137  |  104  |  18  ----------------------------<  59<L>  4.1   |  20<L>  |  0.67    Ca    8.6      02 Dec 2022 07:00    TPro  6.0  /  Alb  2.8<L>  /  TBili  0.6  /  DBili  x   /  AST  19  /  ALT  11  /  AlkPhos  47  12    Total Cholesterol: 236  LDL: --  HDL: 56  T CARDIOLOGY FELLOW CONSULT NOTE    HPI:  89yof PMHx of macular degeneration (appears to be legally blind), severe presbycusis, and hypertension presents after 1 week of abdominal distention mild nausea enlarging abdomen went to PMD Dr. Rodriguez having routine blood work done for this issue and sent in today for evaluation after lipase greater than 6000.  Denies previous abdominal surgeries denies constipation diarrhea. Denies recent trauma, fevers, chills, headache, dizziness, nausea, vomiting, dysuria, freq, hematuria, diarrhea, constipation, chest pain, shortness of breath, cough.    Interval Events:  GI asked to consult for pancreatitis. lipase >6000. CT confirmed pancreatitis, no gallstones. GGO in lungs  Made NPO and receiving IVF.  Pulm consulted for new hypoxia recently, recommended duplexes which showed no DVT  RRT called by primary team for "new MI". I arrived at bedside where patient is without chest pain but is dyspneic, on supplemental O2 through NRB. BP is at baseline 130s-140s systolic. EKG with TWI in precordial V1-V3 leads, but incomplete RBBB as well is appreciated.   ABG with 7.36|33 | 75|19  XR with interval worsening of pulmonary edema   I was informed by ACP that patient has been coughing up blood tinged sputum, mostly pink and frothy. Hgb 11 >( but could be dilutional in the setting of fluid administration as all cell lines are down).  Labs with nml Cre, nml LFTs, hstrop 99,  (in the absence of chest pain). WBC 15.22 Hgb 11.3  Patient is full code.     PMHx:   Seizure disorder  GERD (gastroesophageal reflux disease)  Arthritis  Macular degeneration  Hard of hearing, bilateral  Visual impairment    PSHx:   No Past Surgical History  S/P hip replacement, left    Allergies:  bacitracin (Unknown)  Flonase (Unknown)  fluorescein (Unknown)  iodine (Anaphylaxis)  iodine topical (Unknown)  latex (Unknown)  Levaquin (Unknown)  levofloxacin (Unknown)  morphine (Unknown)  Neosporin (Unknown)  shellfish (Anaphylaxis)  shellfish (Unknown)  sulfa drugs (Unknown)    Current Medications:   clopidogrel Tablet 75 milliGRAM(s) Oral daily  ethacrynic acid IVPB 50 milliGRAM(s) IV Intermittent once  hydrALAZINE Injectable 10 milliGRAM(s) IV Push once  hydrALAZINE Injectable 10 milliGRAM(s) IV Push once  lactated ringers. 1000 milliLiter(s) IV Continuous <Continuous>  polyethylene glycol 3350 17 Gram(s) Oral daily    REVIEW OF SYSTEMS: ROS neg unless stated above    Physical Exam:  T(F): 98.3 (-), Max: 98.4 ()  HR: 90 (-) (73 - 92)  BP: 142/71 (-) (139/74 - 142/71)  RR: 18 (-)  SpO2: 92% ()  GENERAL: distress, on NRB. Frail, elderly  HEAD:  Atraumatic, Normocephalic  ENT: EOMI, PERRLA, conjunctiva and sclera clear, Neck supple, No JVD, moist mucosa  CHEST/LUNG: diffuse crackles anteriorly   BACK: No spinal tenderness  HEART: Regular rate and rhythm; No murmurs, rubs, or gallops  ABDOMEN: Soft, Nontender, Nondistended; Bowel sounds present  EXTREMITIES:  No clubbing, cyanosis, or edema  PSYCH: Nl behavior, nl affect  NEUROLOGY: AAOx3, non-focal, cranial nerves intact  SKIN: Normal color, No rashes or lesions    ECG: sinus rhythm, asymmetric TWI in precordial leads V1-V3, no significant DOMINIQUE  q waves in V3-V4    CXR:  bilateral fluffy infiltrates with cephalization     CT:  Acute interstitial pancreatitis. No pancreatic or peripancreatic   collections.  Groundglass opacities, interlobular septal thickening and bronchial wall   thickening at the lung bases, which could be related to mild pulmonary   edema or infection.    Duplexes: Neg    Echo:  Pending    Labs: Personally reviewed                        9.3    7.40  )-----------( 141      ( 02 Dec 2022 07:00 )             28.0     12    137  |  104  |  18  ----------------------------<  59<L>  4.1   |  20<L>  |  0.67    Ca    8.6      02 Dec 2022 07:00    TPro  6.0  /  Alb  2.8<L>  /  TBili  0.6  /  DBili  x   /  AST  19  /  ALT  11  /  AlkPhos  47  1202    Total Cholesterol: 236  LDL: --  HDL: 56  T

## 2022-12-02 NOTE — PROGRESS NOTE ADULT - PROBLEM SELECTOR PLAN 3
Patient found to have an O2 sat in the 80s on room air  increased to 93 on O2  found to have ground glass opacities in the RUL on CT  will ask pulmonary to see the Patient

## 2022-12-02 NOTE — PHYSICAL THERAPY INITIAL EVALUATION ADULT - GAIT DEVIATIONS NOTED, PT EVAL
decreased narda/decreased velocity of limb motion/decreased step length/decreased weight-shifting ability

## 2022-12-02 NOTE — RAPID RESPONSE TEAM SUMMARY - NSSITUATIONBACKGROUNDRRT_GEN_ALL_CORE
89 y/o female, PMHx macular degeneration, severe presbycusis, HTN, presented to the ED on 12/1 complaining of abdominal distention and nausea x 1 week and now admitted for acute pancreatitis. Pt was kept NPO, and was given maintenance IVF lactated ringers 100mL/hour. This afternoon, pt desaturated on room air to 84%, was placed on 2L with improvement and IVF was stopped. Tonight, pt was ambulating to commode with RN and after getting up to return to bed, pt stated she felt SOB, RRT was called.

## 2022-12-02 NOTE — CONSULT NOTE ADULT - ASSESSMENT
Plan:  - CHlorthalidone vs. triamterene for diuresis given sulfa allergy precluding loop diuretics  Assessment:  89yof PMHx of macular degeneration (appears to be legally blind), severe presbycusis, and hypertension, who presents with hypoxemic respiratory failure in the setting of flash pulmonary edema after IVF administration for her pancreatitis.  She has been coughing up pinkish bloody sputum; likely pulmonary congestion and low suspicion for DAH.   She is with interval worsening of XR, oxygen requirement, dyspnea, but no active chest pain.     Plan:  - Chlorthalidone vs. triamterene for diuresis given sulfa allergy precluding loop diuretics. Goal net neg 1-2L   - Can consider NIPPV for additioal fluid offloading and work of breathing  - Will follow up labs. At this point in time, presentation is not consistent with ACS/type I NSTEMI  - F/u TTE   Assessment:  89yof PMHx of macular degeneration (appears to be legally blind), severe presbycusis, and hypertension, who presents with hypoxemic respiratory failure in the setting of flash pulmonary edema after IVF administration for her pancreatitis.  She has been coughing up pinkish bloody sputum; likely pulmonary congestion and low suspicion for DAH.   She is with interval worsening of XR, oxygen requirement, dyspnea, but no active chest pain.     Plan:  - Transfer to telemetry unit for further monitoring  - Chlorthalidone vs. triamterene for diuresis given sulfa allergy precluding loop diuretics. Goal net neg 1-2L   - Can consider NIPPV for additioal fluid offloading and work of breathing  - Will follow up labs. At this point in time, presentation is not consistent with ACS/type I NSTEMI  - F/u TTE   Assessment:  89yof PMHx of macular degeneration (appears to be legally blind), severe presbycusis, and hypertension, who presents with hypoxemic respiratory failure in the setting of flash pulmonary edema after IVF administration for her pancreatitis.  She has been coughing up pinkish bloody sputum; likely pulmonary congestion and low suspicion for DAH.   She is with interval worsening of XR, oxygen requirement, dyspnea, but no active chest pain.     Plan:  - Transfer to telemetry unit for further monitoring  - Chlorthalidone vs. triamterene for diuresis given sulfa allergy precluding loop diuretics. Goal net neg 1-2L   - Can consider NIPPV for additioal fluid offloading and work of breathing  - Labs with hstrop 99. At this point in time, presentation is not consistent with ACS/type I NSTEMI, suspect type II from demand  - F/u TTE    Please do not hesitate to contact cardiology if worsening or new onset chest pain Assessment:  89yof PMHx of macular degeneration (appears to be legally blind), severe presbycusis, and hypertension, who presents with hypoxemic respiratory failure in the setting of flash pulmonary edema after IVF administration for her pancreatitis.  She has been coughing up pinkish bloody sputum; likely pulmonary congestion and low suspicion for DAH.   She is with interval worsening of XR, oxygen requirement, dyspnea, but no active chest pain.     Plan:  - Transfer to telemetry unit for further monitoring  - Chlorthalidone vs. triamterene for diuresis given sulfa allergy precluding loop diuretics. Goal net neg 1-2L   - Can consider NIPPV for additioal fluid offloading and work of breathing  - Labs with hstrop 99. At this point in time, presentation is not consistent with ACS/type I NSTEMI, suspect type II from demand. Defer DAPT and hep gtt  - F/u TTE    Please do not hesitate to contact cardiology if worsening or new onset chest pain Assessment:  89yof PMHx of macular degeneration (appears to be legally blind), severe presbycusis, and hypertension, who presents with hypoxemic respiratory failure in the setting of flash pulmonary edema after IVF administration for her pancreatitis.  She has been coughing up pinkish bloody sputum; likely pulmonary congestion and low suspicion for DAH.   She is with interval worsening of XR, oxygen requirement, dyspnea, but no active chest pain.     Plan:  - Transfer to telemetry unit for further monitoring  - Chlorthalidone vs. triamterene for diuresis given sulfa allergy precluding loop diuretics. Goal net neg 1-2L. SHe is refusing purewick, trend labs/oxygen requirement to assess for euvolemia  - Can consider NIPPV for additional fluid offloading and work of breathing  - Labs with hstrop 99. At this point in time, presentation is not consistent with ACS/type I NSTEMI, suspect type II from demand. Defer DAPT and hep gtt  - No BB for now as patient is in [place]   - F/u TTE  - Patient is DNR/DNI    Please do not hesitate to contact cardiology if worsening or new onset chest pain Assessment:  89yof PMHx of macular degeneration (appears to be legally blind), severe presbycusis, and hypertension, who presents with hypoxemic respiratory failure in the setting of flash pulmonary edema after IVF administration for her pancreatitis.  She has been coughing up pinkish bloody sputum; likely pulmonary congestion and low suspicion for DAH.   She is with interval worsening of XR, oxygen requirement, dyspnea, but no active chest pain.     Plan:  - Transfer to telemetry unit for further monitoring  - Chlorthalidone vs. triamterene for diuresis given sulfa allergy precluding loop diuretics. Goal net neg 1-2L. SHe is refusing purewick, trend labs/oxygen requirement to assess for euvolemia  - Can consider NIPPV for additional fluid offloading and work of breathing  - Labs with hstrop 99. At this point in time, presentation is not consistent with ACS/type I NSTEMI, suspect type II from demand. Defer DAPT and hep gtt. Would opt for more conservative approach to probable underlying ischemic disease given age. No BB for now as patient is in [place] II, but may initiate metop 25XL as she approaches euvolemia  - F/u TTE  - Patient is DNR/DNI    Please do not hesitate to contact cardiology if worsening or new onset chest pain Assessment:  89yof PMHx of macular degeneration (appears to be legally blind), severe presbycusis, and hypertension, who presents with hypoxemic respiratory failure in the setting of flash pulmonary edema after IVF administration for her pancreatitis.  She has been coughing up pinkish bloody sputum; likely pulmonary congestion and low suspicion for DAH.   She is with interval worsening of XR, oxygen requirement, dyspnea, but no active chest pain.     Plan:  - Transfer to telemetry unit for further monitoring  - Chlorthalidone vs. triamterene for diuresis given sulfa allergy precluding loop diuretics. Goal net neg 1-2L. SHe is refusing purewick, trend labs/oxygen requirement to assess for euvolemia  - Can consider NIPPV for additional fluid offloading and work of breathing  - Labs with hstrop 99 > 324. At this point in time, presentation is not consistent with ACS/type I NSTEMI, suspect type II from demand. Defer DAPT and hep gtt. Would opt for more conservative approach to probable underlying ischemic disease given age. No BB for now as patient is in [place], but may initiate metop 25XL as she approaches euvolemia. Trend troponin to peak with q6-8h lab draws.   - F/u TTE  - Patient is DNR/DNI    Please do not hesitate to call back if new chest pain, clinical status changes or hemodynamic instability.   Please do not hesitate to contact cardiology if worsening or new onset chest pain Assessment:  89yof PMHx of macular degeneration (appears to be legally blind), severe presbycusis, and hypertension, who presents with hypoxemic respiratory failure in the setting of flash pulmonary edema after IVF administration for her pancreatitis.  She has been coughing up pinkish bloody sputum; likely pulmonary congestion and low suspicion for DAH.   She is with interval worsening of XR, oxygen requirement, dyspnea, but no active chest pain.     Plan:  - Transfer to telemetry unit for further monitoring  - Ethacrynic acid for diuresis given sulfa allergy which may preclude loop diuretics. Goal net neg 1-2L. SHe is refusing purewick, trend labs/oxygen requirement to assess for euvolemia  - Can consider NIPPV for additional fluid offloading and work of breathing  - Labs with hstrop 99 > 324. At this point in time, presentation is not consistent with ACS/type I NSTEMI, suspect type II from demand. Defer DAPT and hep gtt. Would opt for more conservative approach to probable underlying ischemic disease given age. No BB for now as patient is in [place] II, but may initiate metop tartrate 6.25 bid as she approaches euvolemia. Trend troponin to peak with q6-8h lab draws.   - F/u TTE  - Patient is DNR/DNI    Please do not hesitate to call back if new chest pain, clinical status changes or hemodynamic instability.   Please do not hesitate to contact cardiology if worsening or new onset chest pain

## 2022-12-02 NOTE — PHYSICAL THERAPY INITIAL EVALUATION ADULT - ADDITIONAL COMMENTS
Patient reports she lives alone in an apartment with elevator access. She ambulates with a rolling walker and was independent prior. She has a HHA 6 hours/7days a week. Daughter states she was "very independent" prior.

## 2022-12-02 NOTE — CHART NOTE - NSCHARTNOTEFT_GEN_A_CORE
Informed by RN patient hypoxic to the low 80s on 5LNC, tachycardic with HR in 130s, and tachypneic. RRT called. Please refer to RRT note for further details. Patient is a FULL code. Attending Dr. Molina was notified. RRT team informed patient's daughter of acute event - Patient likely in flash pulmonary edema - pending transfer to telemetry unit once stable. RRT ongoing - Sign out given to NP Junie Richter for follow up. Informed by RN patient hypoxic to the low 80s on 5LNC, tachycardic with HR in 130s, and tachypneic. RRT called. Please refer to RRT note for further details. Patient is a FULL code. Attending Dr. Molina was notified. RRT team informed patient's daughter of acute event - Patient likely in flash pulmonary edema - placed on BiPAP pending transfer to telemetry unit once stable. RRT ongoing - Sign out given to GAURAV Richter and BURT Fountain for follow up. Informed by RN patient hypoxic to the low 80s on 5LNC, tachycardic with HR in 130s, and tachypneic. RRT called. Please refer to RRT note for further details. Patient is a FULL code. Attending Dr. Molina was notified. RRT team informed patient's daughter, Erika, of acute event - Patient likely in flash pulmonary edema - placed on BiPAP pending transfer to telemetry unit once stable. RRT ongoing - Sign out given to GAURAV Richter and BURT Fountain for follow up.

## 2022-12-03 LAB
ANION GAP SERPL CALC-SCNC: 16 MMOL/L — SIGNIFICANT CHANGE UP (ref 5–17)
BUN SERPL-MCNC: 16 MG/DL — SIGNIFICANT CHANGE UP (ref 7–23)
CALCIUM SERPL-MCNC: 8.7 MG/DL — SIGNIFICANT CHANGE UP (ref 8.4–10.5)
CHLORIDE SERPL-SCNC: 100 MMOL/L — SIGNIFICANT CHANGE UP (ref 96–108)
CK MB BLD-MCNC: 3.5 % — SIGNIFICANT CHANGE UP (ref 0–3.5)
CK MB CFR SERPL CALC: 11.7 NG/ML — HIGH (ref 0–3.8)
CK SERPL-CCNC: 332 U/L — HIGH (ref 25–170)
CO2 SERPL-SCNC: 21 MMOL/L — LOW (ref 22–31)
CREAT SERPL-MCNC: 0.8 MG/DL — SIGNIFICANT CHANGE UP (ref 0.5–1.3)
CULTURE RESULTS: SIGNIFICANT CHANGE UP
EGFR: 65 ML/MIN/1.73M2 — SIGNIFICANT CHANGE UP
GLUCOSE SERPL-MCNC: 69 MG/DL — LOW (ref 70–99)
HCT VFR BLD CALC: 31.5 % — LOW (ref 34.5–45)
HGB BLD-MCNC: 10.3 G/DL — LOW (ref 11.5–15.5)
LIDOCAIN IGE QN: 1595 U/L — HIGH (ref 7–60)
MCHC RBC-ENTMCNC: 29.2 PG — SIGNIFICANT CHANGE UP (ref 27–34)
MCHC RBC-ENTMCNC: 32.7 GM/DL — SIGNIFICANT CHANGE UP (ref 32–36)
MCV RBC AUTO: 89.2 FL — SIGNIFICANT CHANGE UP (ref 80–100)
MRSA PCR RESULT.: SIGNIFICANT CHANGE UP
NRBC # BLD: 0 /100 WBCS — SIGNIFICANT CHANGE UP (ref 0–0)
PLATELET # BLD AUTO: 181 K/UL — SIGNIFICANT CHANGE UP (ref 150–400)
POTASSIUM SERPL-MCNC: 3.7 MMOL/L — SIGNIFICANT CHANGE UP (ref 3.5–5.3)
POTASSIUM SERPL-SCNC: 3.7 MMOL/L — SIGNIFICANT CHANGE UP (ref 3.5–5.3)
RBC # BLD: 3.53 M/UL — LOW (ref 3.8–5.2)
RBC # FLD: 13.7 % — SIGNIFICANT CHANGE UP (ref 10.3–14.5)
S AUREUS DNA NOSE QL NAA+PROBE: SIGNIFICANT CHANGE UP
SODIUM SERPL-SCNC: 137 MMOL/L — SIGNIFICANT CHANGE UP (ref 135–145)
SPECIMEN SOURCE: SIGNIFICANT CHANGE UP
TROPONIN T, HIGH SENSITIVITY RESULT: 324 NG/L — HIGH (ref 0–51)
WBC # BLD: 12.67 K/UL — HIGH (ref 3.8–10.5)
WBC # FLD AUTO: 12.67 K/UL — HIGH (ref 3.8–10.5)

## 2022-12-03 PROCEDURE — 99222 1ST HOSP IP/OBS MODERATE 55: CPT | Mod: GC

## 2022-12-03 PROCEDURE — 93306 TTE W/DOPPLER COMPLETE: CPT | Mod: 26

## 2022-12-03 RX ORDER — ETHACRYNIC ACID 25 MG/1
50 TABLET ORAL ONCE
Refills: 0 | Status: COMPLETED | OUTPATIENT
Start: 2022-12-03 | End: 2022-12-03

## 2022-12-03 RX ORDER — CHLORHEXIDINE GLUCONATE 213 G/1000ML
1 SOLUTION TOPICAL DAILY
Refills: 0 | Status: DISCONTINUED | OUTPATIENT
Start: 2022-12-03 | End: 2022-12-10

## 2022-12-03 RX ORDER — SODIUM CHLORIDE 9 MG/ML
1000 INJECTION, SOLUTION INTRAVENOUS
Refills: 0 | Status: DISCONTINUED | OUTPATIENT
Start: 2022-12-03 | End: 2022-12-06

## 2022-12-03 RX ADMIN — POLYETHYLENE GLYCOL 3350 17 GRAM(S): 17 POWDER, FOR SOLUTION ORAL at 13:17

## 2022-12-03 RX ADMIN — CHLORHEXIDINE GLUCONATE 1 APPLICATION(S): 213 SOLUTION TOPICAL at 13:16

## 2022-12-03 RX ADMIN — SODIUM CHLORIDE 75 MILLILITER(S): 9 INJECTION, SOLUTION INTRAVENOUS at 22:39

## 2022-12-03 RX ADMIN — ETHACRYNIC ACID 100 MILLIGRAM(S): 25 TABLET ORAL at 13:50

## 2022-12-03 RX ADMIN — Medication 1 INCH(S): at 09:00

## 2022-12-03 RX ADMIN — CLOPIDOGREL BISULFATE 75 MILLIGRAM(S): 75 TABLET, FILM COATED ORAL at 12:39

## 2022-12-03 RX ADMIN — SODIUM CHLORIDE 125 MILLILITER(S): 9 INJECTION, SOLUTION INTRAVENOUS at 10:16

## 2022-12-03 NOTE — PROGRESS NOTE ADULT - SUBJECTIVE AND OBJECTIVE BOX
89yof PMHx of macular degeneration (appears to be legally blind), severe presbycusis, and hypertension presents after 1 week of abdominal distention mild nausea enlarging abdomen went to PMD Dr. Villalobos having routine blood work done for this issue and sent in today for evaluation after lipase greater than 6000.  Denies previous abdominal surgeries denies constipation diarrhea Patient has been resting comfortably.  Patient had an episode of shortness of breath last night. Started on ethacrynic acid with improvement in symptoms. Patient moved to [facility] for further monitoring     MEDICATIONS  (STANDING):  chlorhexidine 2% Cloths 1 Application(s) Topical daily  clopidogrel Tablet 75 milliGRAM(s) Oral daily  ethacrynic acid IVPB 50 milliGRAM(s) IV Intermittent once  lactated ringers. 1000 milliLiter(s) (125 mL/Hr) IV Continuous <Continuous>  ondansetron Injectable 4 milliGRAM(s) IV Push once  polyethylene glycol 3350 17 Gram(s) Oral daily    MEDICATIONS  (PRN):          VITALS:   T(C): 36.7 (22 @ 04:08), Max: 37.3 (22 @ 20:38)  HR: 85 (22 @ 09:32) (85 - 101)  BP: 119/66 (22 @ 04:08) (119/66 - 142/74)  RR: 18 (22 @ 04:08) (18 - 18)  SpO2: 95% (22 @ 09:32) (95% - 100%)  Wt(kg): --    PHYSICAL EXAM:  GENERAL: NAD, well-developed  HEAD:  Atraumatic, Normocephalic  EYES: EOMI, PERRLA, conjunctiva and sclera clear  NECK: Supple, No JVD  CHEST/LUNG: Clear to auscultation bilaterally; No wheeze  HEART: Regular rate and rhythm; No murmurs, rubs, or gallops  ABDOMEN: Soft, mild diffuse tender, Nondistended; Bowel sounds present  EXTREMITIES:  2+ Peripheral Pulses, No clubbing, cyanosis, or edema  PSYCH: AAOx3  NEUROLOGY: non-focal  SKIN: No rashes or lesions    LABS:  ABG - ( 02 Dec 2022 19:13 )  pH, Arterial: 7.36  pH, Blood: x     /  pCO2: 33    /  pO2: 75    / HCO3: 19    / Base Excess: -6.0  /  SaO2: 97.1              CARDIAC MARKERS ( 03 Dec 2022 03:29 )  x     / x     / 332 U/L / x     / 11.7 ng/mL  CARDIAC MARKERS ( 02 Dec 2022 19:22 )  x     / x     / 341 U/L / x     / 9.4 ng/mL      CBC Full  -  ( 03 Dec 2022 07:04 )  WBC Count : 12.67 K/uL  RBC Count : 3.53 M/uL  Hemoglobin : 10.3 g/dL  Hematocrit : 31.5 %  Platelet Count - Automated : 181 K/uL  Mean Cell Volume : 89.2 fl  Mean Cell Hemoglobin : 29.2 pg  Mean Cell Hemoglobin Concentration : 32.7 gm/dL  Auto Neutrophil # : x  Auto Lymphocyte # : x  Auto Monocyte # : x  Auto Eosinophil # : x  Auto Basophil # : x  Auto Neutrophil % : x  Auto Lymphocyte % : x  Auto Monocyte % : x  Auto Eosinophil % : x  Auto Basophil % : x    12-03    137  |  100  |  16  ----------------------------<  69<L>  3.7   |  21<L>  |  0.80    Ca    8.7      03 Dec 2022 07:01    TPro  7.0  /  Alb  3.3  /  TBili  0.8  /  DBili  x   /  AST  28  /  ALT  13  /  AlkPhos  62  12-02    LIVER FUNCTIONS - ( 02 Dec 2022 19:22 )  Alb: 3.3 g/dL / Pro: 7.0 g/dL / ALK PHOS: 62 U/L / ALT: 13 U/L / AST: 28 U/L / GGT: x             Urinalysis Basic - ( 01 Dec 2022 18:42 )    Color: Yellow / Appearance: Slightly Turbid / S.020 / pH: x  Gluc: x / Ketone: Small  / Bili: Negative / Urobili: Negative   Blood: x / Protein: 100 / Nitrite: Negative   Leuk Esterase: Negative / RBC: 5 /hpf / WBC 8 /HPF   Sq Epi: x / Non Sq Epi: 3 /hpf / Bacteria: Negative      CAPILLARY BLOOD GLUCOSE      POCT Blood Glucose.: 93 mg/dL (02 Dec 2022 18:38)      RADIOLOGY & ADDITIONAL TESTS:

## 2022-12-03 NOTE — PROGRESS NOTE ADULT - ASSESSMENT
89y.o. Female abdominal pain   elevated lipase   pancreatitis, clinically improving    lipase downtrending  CT non con with acute pancreatitis, no evidence of gallstones   triglycerides are normal   CLD  advance diet as tolerated  IVF   trend lipase  pain meds prn  will follow  dw pt and son at bedside       Advanced care planning was discussed with patient and family.  Advanced care planning forms were reviewed and discussed.  Risks, benefits and alternatives of gastroenterologic procedures were discussed in detail and all questions were answered.    30 minutes spent.

## 2022-12-03 NOTE — PROGRESS NOTE ADULT - PROBLEM SELECTOR PLAN 3
Patient found to have an O2 sat in the 80s on room air  increased to 93 on O2  found to have ground glass opacities in the RUL on CT  continue diuretics

## 2022-12-03 NOTE — PROGRESS NOTE ADULT - SUBJECTIVE AND OBJECTIVE BOX
[facility]  Keegan Cox PA-C  [address] [place] [id number] [phone]    INTERVAL HPI/OVERNIGHT EVENTS:  Pt resting comfortably. No acute complaints.   Feeling better.  Started clear liquid diet today.  No appetite.  +flatus/BM.   Denies N/V, abd pain.    MEDICATIONS  (STANDING):  chlorhexidine 2% Cloths 1 Application(s) Topical daily  clopidogrel Tablet 75 milliGRAM(s) Oral daily  ethacrynic acid IVPB 50 milliGRAM(s) IV Intermittent once  lactated ringers. 1000 milliLiter(s) (125 mL/Hr) IV Continuous <Continuous>  ondansetron Injectable 4 milliGRAM(s) IV Push once  polyethylene glycol 3350 17 Gram(s) Oral daily    Vital Signs Last 24 Hrs  T(C): 36.5 (03 Dec 2022 11:02), Max: 37.3 (02 Dec 2022 20:38)  T(F): 97.7 (03 Dec 2022 11:02), Max: 99.1 (02 Dec 2022 20:38)  HR: 84 (03 Dec 2022 11:02) (84 - 101)  BP: 123/74 (03 Dec 2022 11:02) (119/66 - 142/74)  BP(mean): --  RR: 18 (03 Dec 2022 11:02) (18 - 18)  SpO2: 95% (03 Dec 2022 11:02) (95% - 100%)    Parameters below as of 03 Dec 2022 11:02  Patient On (Oxygen Delivery Method): room air    Physical:  General: A&Ox3. NAD.  Abdomen: Soft nondistended, nontender.    I&O's Detail    02 Dec 2022 07:01  -  03 Dec 2022 07:00  --------------------------------------------------------  IN:    Oral Fluid: 240 mL  Total IN: 240 mL    OUT:    Voided (mL): 300 mL  Total OUT: 300 mL    Total NET: -60 mL    LABS:                        10.3   12.67 )-----------( 181      ( 03 Dec 2022 07:04 )             31.5             12-03    137  |  100  |  16  ----------------------------<  69<L>  3.7   |  21<L>  |  0.80    Ca    8.7      03 Dec 2022 07:01    TPro  7.0  /  Alb  3.3  /  TBili  0.8  /  DBili  x   /  AST  28  /  ALT  13  /  AlkPhos  62  12-02

## 2022-12-03 NOTE — PROVIDER CONTACT NOTE (CHANGE IN STATUS NOTIFICATION) - ASSESSMENT
Pharmacy contacted- burning is not a side effect to IV administration. LR compatible - ethacrynic acid piggybacked with LR. IV site intact- however vein lateral to IV site more reddened by end of infusion.

## 2022-12-04 LAB
ANION GAP SERPL CALC-SCNC: 12 MMOL/L — SIGNIFICANT CHANGE UP (ref 5–17)
ANION GAP SERPL CALC-SCNC: 13 MMOL/L — SIGNIFICANT CHANGE UP (ref 5–17)
BUN SERPL-MCNC: 12 MG/DL — SIGNIFICANT CHANGE UP (ref 7–23)
BUN SERPL-MCNC: 12 MG/DL — SIGNIFICANT CHANGE UP (ref 7–23)
CALCIUM SERPL-MCNC: 8.6 MG/DL — SIGNIFICANT CHANGE UP (ref 8.4–10.5)
CALCIUM SERPL-MCNC: 8.6 MG/DL — SIGNIFICANT CHANGE UP (ref 8.4–10.5)
CHLORIDE SERPL-SCNC: 97 MMOL/L — SIGNIFICANT CHANGE UP (ref 96–108)
CHLORIDE SERPL-SCNC: 99 MMOL/L — SIGNIFICANT CHANGE UP (ref 96–108)
CO2 SERPL-SCNC: 22 MMOL/L — SIGNIFICANT CHANGE UP (ref 22–31)
CO2 SERPL-SCNC: 24 MMOL/L — SIGNIFICANT CHANGE UP (ref 22–31)
CREAT SERPL-MCNC: 0.64 MG/DL — SIGNIFICANT CHANGE UP (ref 0.5–1.3)
CREAT SERPL-MCNC: 0.83 MG/DL — SIGNIFICANT CHANGE UP (ref 0.5–1.3)
EGFR: 62 ML/MIN/1.73M2 — SIGNIFICANT CHANGE UP
EGFR: 78 ML/MIN/1.73M2 — SIGNIFICANT CHANGE UP
GLUCOSE SERPL-MCNC: 112 MG/DL — HIGH (ref 70–99)
GLUCOSE SERPL-MCNC: 88 MG/DL — SIGNIFICANT CHANGE UP (ref 70–99)
LIDOCAIN IGE QN: 1998 U/L — HIGH (ref 7–60)
MAGNESIUM SERPL-MCNC: 1.6 MG/DL — SIGNIFICANT CHANGE UP (ref 1.6–2.6)
NT-PROBNP SERPL-SCNC: HIGH PG/ML (ref 0–300)
PHOSPHATE SERPL-MCNC: 2.4 MG/DL — LOW (ref 2.5–4.5)
POTASSIUM SERPL-MCNC: 3.5 MMOL/L — SIGNIFICANT CHANGE UP (ref 3.5–5.3)
POTASSIUM SERPL-MCNC: 4.3 MMOL/L — SIGNIFICANT CHANGE UP (ref 3.5–5.3)
POTASSIUM SERPL-SCNC: 3.5 MMOL/L — SIGNIFICANT CHANGE UP (ref 3.5–5.3)
POTASSIUM SERPL-SCNC: 4.3 MMOL/L — SIGNIFICANT CHANGE UP (ref 3.5–5.3)
SODIUM SERPL-SCNC: 132 MMOL/L — LOW (ref 135–145)
SODIUM SERPL-SCNC: 135 MMOL/L — SIGNIFICANT CHANGE UP (ref 135–145)
TROPONIN T, HIGH SENSITIVITY RESULT: 257 NG/L — HIGH (ref 0–51)

## 2022-12-04 RX ORDER — ACETAMINOPHEN 500 MG
650 TABLET ORAL ONCE
Refills: 0 | Status: COMPLETED | OUTPATIENT
Start: 2022-12-04 | End: 2022-12-04

## 2022-12-04 RX ORDER — ETHACRYNIC ACID 25 MG/1
50 TABLET ORAL ONCE
Refills: 0 | Status: COMPLETED | OUTPATIENT
Start: 2022-12-04 | End: 2022-12-04

## 2022-12-04 RX ORDER — METOPROLOL TARTRATE 50 MG
12.5 TABLET ORAL
Refills: 0 | Status: DISCONTINUED | OUTPATIENT
Start: 2022-12-04 | End: 2022-12-10

## 2022-12-04 RX ADMIN — POLYETHYLENE GLYCOL 3350 17 GRAM(S): 17 POWDER, FOR SOLUTION ORAL at 11:53

## 2022-12-04 RX ADMIN — SODIUM CHLORIDE 75 MILLILITER(S): 9 INJECTION, SOLUTION INTRAVENOUS at 18:34

## 2022-12-04 RX ADMIN — SODIUM CHLORIDE 75 MILLILITER(S): 9 INJECTION, SOLUTION INTRAVENOUS at 11:53

## 2022-12-04 RX ADMIN — ETHACRYNIC ACID 100 MILLIGRAM(S): 25 TABLET ORAL at 20:47

## 2022-12-04 RX ADMIN — CHLORHEXIDINE GLUCONATE 1 APPLICATION(S): 213 SOLUTION TOPICAL at 11:53

## 2022-12-04 RX ADMIN — CLOPIDOGREL BISULFATE 75 MILLIGRAM(S): 75 TABLET, FILM COATED ORAL at 11:53

## 2022-12-04 RX ADMIN — Medication 650 MILLIGRAM(S): at 18:33

## 2022-12-04 RX ADMIN — Medication 650 MILLIGRAM(S): at 19:10

## 2022-12-04 NOTE — CHART NOTE - NSCHARTNOTEFT_GEN_A_CORE
Medicine PA Note     LUZ TSANG  [id number]  Allergies    bacitracin (Unknown)  Flonase (Unknown)  fluorescein (Unknown)  iodine (Anaphylaxis)  iodine topical (Unknown)  latex (Unknown)  Levaquin (Unknown)  levofloxacin (Unknown)  morphine (Unknown)  Neosporin (Unknown)  shellfish (Anaphylaxis)  shellfish (Unknown)  sulfa drugs (Unknown)    Intolerances        Notified by RN for abdominal destention/discomfort and difficulty breathing. Pt states she feels more distended than usual describing it more as a discomfort rather than pain. States she has had 2 BM today, is passing gas. She is also c/o difficulty breathing, especially with deep breaths as she starts to cough. Pt describes cough as dry. Pt denies headache, chest pain, n/v/d, weakness, dizziness, abdominal pain, changes in BM.    Vital Signs Last 24 Hrs  T(C): 36.8 (12-05-22 @ 00:24), Max: 36.8 (12-04-22 @ 10:51)  T(F): 98.2 (12-05-22 @ 00:24), Max: 98.3 (12-04-22 @ 10:51)  HR: 83 (12-05-22 @ 00:24) (71 - 107)  BP: 157/63 (12-05-22 @ 00:24) (121/54 - 157/63)  BP(mean): --  RR: 20 (12-05-22 @ 00:24) (17 - 24)  SpO2: 94% (12-05-22 @ 00:24) (90% - 99%)                        10.3   12.67 )-----------( 181      ( 03 Dec 2022 07:04 )             31.5     12-04    132<L>  |  97  |  12  ----------------------------<  112<H>  4.3   |  22  |  0.64    Ca    8.6      04 Dec 2022 22:38  Phos  2.4     12-04  Mg     1.6     12-04            PHYSICAL EXAM:  GENERAL: NAD, well-developed  CHEST/LUNG: Wheezing heard throughout; no rhonchi or rales appreciated  HEART: Regular rate and rhythm; No murmurs, rubs, or gallops  ABDOMEN: Slight abdominal distention noted, soft, Nontender; Bowel sounds present. No rebound, or guarding noted.  EXTREMITIES:  2+ Peripheral Pulses, No clubbing, cyanosis, or edema        Assessment/Plan: HPI:  89yof PMHx of macular degeneration (appears to be legally blind), severe presbycusis, and hypertension presents after 1 week of abdominal distention mild nausea enlarging abdomen found to have pancreatitis now c/o abdominal distention/discomfort and difficulty breathing. Denies previous abdominal surgeries, recent trauma, fevers, chills, headache, dizziness, nausea, vomiting, dysuria, freq, hematuria, diarrhea, constipation, chest pain.    SOB 2/2 pulmonary edema  Abdominal distention 2/2 pancreatitis    >VS hemodynamically stable  > Ethacrynic acid 50 ordered  > Fluids held   > BMP and BNP ordered STAT  > Will reassess need for fluids in AM  > Consider CXR if SOB worsens or becomes hypoxic   > Plan discussed with Dr. Molina  > Will endorse to AM team, attending to follow    Davin Feliz PA-C,   Department of Medicine

## 2022-12-04 NOTE — PROGRESS NOTE ADULT - PROBLEM SELECTOR PLAN 3
Patient found to have an O2 sat in the 80s on room air  increased to 93 on O2  found to have ground glass opacities in the RUL on CT  continue diuretics as needed

## 2022-12-04 NOTE — PROGRESS NOTE ADULT - SUBJECTIVE AND OBJECTIVE BOX
[facility]  Keegan Cox PA-C  [address] [place] [id number] [phone]    INTERVAL HPI/OVERNIGHT EVENTS:  Pt resting comfortably.   States had HA yesterday and felt disoriented.  Tolerating clear liquid diet this morning.  Denies N/V.    MEDICATIONS  (STANDING):  chlorhexidine 2% Cloths 1 Application(s) Topical daily  clopidogrel Tablet 75 milliGRAM(s) Oral daily  lactated ringers. 1000 milliLiter(s) (75 mL/Hr) IV Continuous <Continuous>  ondansetron Injectable 4 milliGRAM(s) IV Push once  polyethylene glycol 3350 17 Gram(s) Oral daily    Vital Signs Last 24 Hrs  T(C): 36.8 (04 Dec 2022 10:51), Max: 37.3 (03 Dec 2022 18:05)  T(F): 98.3 (04 Dec 2022 10:51), Max: 99.2 (03 Dec 2022 18:05)  HR: 96 (04 Dec 2022 12:55) (71 - 96)  BP: 126/70 (04 Dec 2022 12:55) (106/62 - 127/65)  BP(mean): --  RR: 20 (04 Dec 2022 12:55) (17 - 20)  SpO2: 92% (04 Dec 2022 12:55) (90% - 100%)    Parameters below as of 04 Dec 2022 12:55  Patient On (Oxygen Delivery Method): nasal cannula w/ humidification  O2 Flow (L/min): 4    Physical:  General: A&Ox3. NAD.  Abdomen: Soft nondistended, nontender.    I&O's Detail    03 Dec 2022 07:01  -  04 Dec 2022 07:00  --------------------------------------------------------  IN:    IV PiggyBack: 950 mL    Lactated Ringers: 875 mL    Oral Fluid: 480 mL  Total IN: 2305 mL    OUT:  Total OUT: 0 mL    Total NET: 2305 mL      04 Dec 2022 07:01  -  04 Dec 2022 13:07  --------------------------------------------------------  IN:    Oral Fluid: 360 mL  Total IN: 360 mL    OUT:    Voided (mL): 200 mL  Total OUT: 200 mL    Total NET: 160 mL    LABS:                        10.3   12.67 )-----------( 181      ( 03 Dec 2022 07:04 )             31.5             12-04    135  |  99  |  12  ----------------------------<  88  3.5   |  24  |  0.83    Ca    8.6      04 Dec 2022 07:39    TPro  7.0  /  Alb  3.3  /  TBili  0.8  /  DBili  x   /  AST  28  /  ALT  13  /  AlkPhos  62  12-02    Lipase, Serum in AM (12.04.22 @ 07:39)    Lipase, Serum: 1998 U/L

## 2022-12-04 NOTE — PROGRESS NOTE ADULT - ASSESSMENT
89y.o. Female abdominal pain   elevated lipase   pancreatitis, clinically improving    lipase increase slightly  CT non con with acute pancreatitis, no evidence of gallstones   triglycerides are normal   CLD  advance diet as tolerated  IVF   trend lipase  pain meds prn  will follow    Advanced care planning was discussed with patient and family.  Advanced care planning forms were reviewed and discussed.  Risks, benefits and alternatives of gastroenterologic procedures were discussed in detail and all questions were answered.    30 minutes spent.

## 2022-12-04 NOTE — PROVIDER CONTACT NOTE (CRITICAL VALUE NOTIFICATION) - ACTION/TREATMENT ORDERED:
No intervention at this time. Will continue to monitor.
Provider will relay information and recommendations to new nurse taking the pt since the pt had a rapid and will be transferred to another floor.

## 2022-12-04 NOTE — PROGRESS NOTE ADULT - SUBJECTIVE AND OBJECTIVE BOX
89yof PMHx of macular degeneration (appears to be legally blind), severe presbycusis, and hypertension presents after 1 week of abdominal distention mild nausea enlarging abdomen went to PMD Dr. Villalobos having routine blood work done for this issue and sent in today for evaluation after lipase greater than 6000.  Denies previous abdominal surgeries denies constipation diarrhea Patient has been resting comfortably.  Patient had an episode of shortness of breath 12/2. Started on ethacrynic acid with improvement in symptoms. Patient moved to [facility] for further monitoring. Continue complaint of abdominal discomfort [facility]    MEDICATIONS  (STANDING):  chlorhexidine 2% Cloths 1 Application(s) Topical daily  clopidogrel Tablet 75 milliGRAM(s) Oral daily  lactated ringers. 1000 milliLiter(s) (75 mL/Hr) IV Continuous <Continuous>  ondansetron Injectable 4 milliGRAM(s) IV Push once  polyethylene glycol 3350 17 Gram(s) Oral daily    MEDICATIONS  (PRN):          VITALS:   T(C): 36.8 (12-04-22 @ 10:51), Max: 37.3 (12-03-22 @ 18:05)  HR: 95 (12-04-22 @ 10:51) (71 - 95)  BP: 127/65 (12-04-22 @ 10:51) (106/62 - 127/65)  RR: 18 (12-04-22 @ 10:51) (17 - 18)  SpO2: 91% (12-04-22 @ 10:51) (90% - 100%)  Wt(kg): --    PHYSICAL EXAM:  GENERAL: NAD, well-developed  HEAD:  Atraumatic, Normocephalic  EYES: EOMI, PERRLA, conjunctiva and sclera clear  NECK: Supple, No JVD  CHEST/LUNG: Clear to auscultation bilaterally; No wheeze  HEART: Regular rate and rhythm; No murmurs, rubs, or gallops  ABDOMEN: Soft, mild diffuse tender, Nondistended; Bowel sounds present  EXTREMITIES:  2+ Peripheral Pulses, No clubbing, cyanosis, or edema  PSYCH: AAOx3  NEUROLOGY: non-focal  SKIN: No rashes or lesions    LABS:  ABG - ( 02 Dec 2022 19:13 )  pH, Arterial: 7.36  pH, Blood: x     /  pCO2: 33    /  pO2: 75    / HCO3: 19    / Base Excess: -6.0  /  SaO2: 97.1              CARDIAC MARKERS ( 03 Dec 2022 03:29 )  x     / x     / 332 U/L / x     / 11.7 ng/mL  CARDIAC MARKERS ( 02 Dec 2022 19:22 )  x     / x     / 341 U/L / x     / 9.4 ng/mL      CBC Full  -  ( 03 Dec 2022 07:04 )  WBC Count : 12.67 K/uL  RBC Count : 3.53 M/uL  Hemoglobin : 10.3 g/dL  Hematocrit : 31.5 %  Platelet Count - Automated : 181 K/uL  Mean Cell Volume : 89.2 fl  Mean Cell Hemoglobin : 29.2 pg  Mean Cell Hemoglobin Concentration : 32.7 gm/dL  Auto Neutrophil # : x  Auto Lymphocyte # : x  Auto Monocyte # : x  Auto Eosinophil # : x  Auto Basophil # : x  Auto Neutrophil % : x  Auto Lymphocyte % : x  Auto Monocyte % : x  Auto Eosinophil % : x  Auto Basophil % : x    12-04    135  |  99  |  12  ----------------------------<  88  3.5   |  24  |  0.83    Ca    8.6      04 Dec 2022 07:39    TPro  7.0  /  Alb  3.3  /  TBili  0.8  /  DBili  x   /  AST  28  /  ALT  13  /  AlkPhos  62  12-02    LIVER FUNCTIONS - ( 02 Dec 2022 19:22 )  Alb: 3.3 g/dL / Pro: 7.0 g/dL / ALK PHOS: 62 U/L / ALT: 13 U/L / AST: 28 U/L / GGT: x               CAPILLARY BLOOD GLUCOSE          RADIOLOGY & ADDITIONAL TESTS:

## 2022-12-05 DIAGNOSIS — J81.1 CHRONIC PULMONARY EDEMA: ICD-10-CM

## 2022-12-05 DIAGNOSIS — R04.2 HEMOPTYSIS: ICD-10-CM

## 2022-12-05 LAB
ANION GAP SERPL CALC-SCNC: 14 MMOL/L — SIGNIFICANT CHANGE UP (ref 5–17)
BUN SERPL-MCNC: 11 MG/DL — SIGNIFICANT CHANGE UP (ref 7–23)
CALCIUM SERPL-MCNC: 9 MG/DL — SIGNIFICANT CHANGE UP (ref 8.4–10.5)
CHLORIDE SERPL-SCNC: 97 MMOL/L — SIGNIFICANT CHANGE UP (ref 96–108)
CO2 SERPL-SCNC: 25 MMOL/L — SIGNIFICANT CHANGE UP (ref 22–31)
CREAT SERPL-MCNC: 0.67 MG/DL — SIGNIFICANT CHANGE UP (ref 0.5–1.3)
D DIMER BLD IA.RAPID-MCNC: 3377 NG/ML DDU — HIGH
EGFR: 77 ML/MIN/1.73M2 — SIGNIFICANT CHANGE UP
GLUCOSE SERPL-MCNC: 107 MG/DL — HIGH (ref 70–99)
HCT VFR BLD CALC: 28.8 % — LOW (ref 34.5–45)
HGB BLD-MCNC: 9.6 G/DL — LOW (ref 11.5–15.5)
LIDOCAIN IGE QN: 1312 U/L — HIGH (ref 7–60)
MAGNESIUM SERPL-MCNC: 2 MG/DL — SIGNIFICANT CHANGE UP (ref 1.6–2.6)
MCHC RBC-ENTMCNC: 29.4 PG — SIGNIFICANT CHANGE UP (ref 27–34)
MCHC RBC-ENTMCNC: 33.3 GM/DL — SIGNIFICANT CHANGE UP (ref 32–36)
MCV RBC AUTO: 88.1 FL — SIGNIFICANT CHANGE UP (ref 80–100)
NRBC # BLD: 0 /100 WBCS — SIGNIFICANT CHANGE UP (ref 0–0)
PHOSPHATE SERPL-MCNC: 3.3 MG/DL — SIGNIFICANT CHANGE UP (ref 2.5–4.5)
PLATELET # BLD AUTO: 153 K/UL — SIGNIFICANT CHANGE UP (ref 150–400)
POTASSIUM SERPL-MCNC: 3.1 MMOL/L — LOW (ref 3.5–5.3)
POTASSIUM SERPL-SCNC: 3.1 MMOL/L — LOW (ref 3.5–5.3)
RBC # BLD: 3.27 M/UL — LOW (ref 3.8–5.2)
RBC # FLD: 13.6 % — SIGNIFICANT CHANGE UP (ref 10.3–14.5)
SARS-COV-2 RNA SPEC QL NAA+PROBE: SIGNIFICANT CHANGE UP
SODIUM SERPL-SCNC: 136 MMOL/L — SIGNIFICANT CHANGE UP (ref 135–145)
WBC # BLD: 12.2 K/UL — HIGH (ref 3.8–10.5)
WBC # FLD AUTO: 12.2 K/UL — HIGH (ref 3.8–10.5)

## 2022-12-05 PROCEDURE — 99223 1ST HOSP IP/OBS HIGH 75: CPT | Mod: GC

## 2022-12-05 PROCEDURE — 71045 X-RAY EXAM CHEST 1 VIEW: CPT | Mod: 26,77

## 2022-12-05 PROCEDURE — 71045 X-RAY EXAM CHEST 1 VIEW: CPT | Mod: 26

## 2022-12-05 RX ORDER — MAGNESIUM SULFATE 500 MG/ML
1 VIAL (ML) INJECTION ONCE
Refills: 0 | Status: COMPLETED | OUTPATIENT
Start: 2022-12-05 | End: 2022-12-05

## 2022-12-05 RX ORDER — ACETAMINOPHEN 500 MG
1000 TABLET ORAL ONCE
Refills: 0 | Status: COMPLETED | OUTPATIENT
Start: 2022-12-05 | End: 2022-12-05

## 2022-12-05 RX ORDER — POTASSIUM CHLORIDE 20 MEQ
40 PACKET (EA) ORAL ONCE
Refills: 0 | Status: COMPLETED | OUTPATIENT
Start: 2022-12-05 | End: 2022-12-05

## 2022-12-05 RX ADMIN — CLOPIDOGREL BISULFATE 75 MILLIGRAM(S): 75 TABLET, FILM COATED ORAL at 12:04

## 2022-12-05 RX ADMIN — Medication 63.75 MILLIMOLE(S): at 03:34

## 2022-12-05 RX ADMIN — Medication 40 MILLIEQUIVALENT(S): at 10:29

## 2022-12-05 RX ADMIN — Medication 1000 MILLIGRAM(S): at 07:31

## 2022-12-05 RX ADMIN — Medication 12.5 MILLIGRAM(S): at 05:46

## 2022-12-05 RX ADMIN — Medication 400 MILLIGRAM(S): at 05:45

## 2022-12-05 RX ADMIN — ONDANSETRON 4 MILLIGRAM(S): 8 TABLET, FILM COATED ORAL at 05:29

## 2022-12-05 RX ADMIN — Medication 12.5 MILLIGRAM(S): at 17:17

## 2022-12-05 RX ADMIN — CHLORHEXIDINE GLUCONATE 1 APPLICATION(S): 213 SOLUTION TOPICAL at 12:05

## 2022-12-05 RX ADMIN — Medication 100 GRAM(S): at 01:15

## 2022-12-05 NOTE — PROGRESS NOTE ADULT - SUBJECTIVE AND OBJECTIVE BOX
Follow-up Pulm Progress Note    Weekend/overnight events noted  O2 sats 93% on 5LNC  Reports of hemoptysis overnight    Medications:  MEDICATIONS  (STANDING):  chlorhexidine 2% Cloths 1 Application(s) Topical daily  clopidogrel Tablet 75 milliGRAM(s) Oral daily  lactated ringers. 1000 milliLiter(s) (75 mL/Hr) IV Continuous <Continuous>  metoprolol tartrate 12.5 milliGRAM(s) Oral two times a day  polyethylene glycol 3350 17 Gram(s) Oral daily    Vital Signs Last 24 Hrs  T(C): 36.3 (05 Dec 2022 08:25), Max: 36.8 (05 Dec 2022 00:24)  T(F): 97.3 (05 Dec 2022 08:25), Max: 98.3 (05 Dec 2022 04:13)  HR: 89 (05 Dec 2022 08:25) (82 - 107)  BP: 144/83 (05 Dec 2022 08:25) (121/67 - 157/63)  BP(mean): --  RR: 18 (05 Dec 2022 08:25) (18 - 24)  SpO2: 92% (05 Dec 2022 08:25) (90% - 95%)    Parameters below as of 05 Dec 2022 08:25  Patient On (Oxygen Delivery Method): nasal cannula  O2 Flow (L/min): 5      12-04 @ 07:01  -  12-05 @ 07:00  --------------------------------------------------------  IN: 1585 mL / OUT: 1400 mL / NET: 185 mL    LABS:                        9.6    12.20 )-----------( 153      ( 05 Dec 2022 06:48 )             28.8     12-05    136  |  97  |  11  ----------------------------<  107<H>  3.1<L>   |  25  |  0.67    Ca    9.0      05 Dec 2022 06:02  Phos  3.3     12-05  Mg     2.0     12-05    Serum Pro-Brain Natriuretic Peptide: 84083 pg/mL (12-04-22 @ 22:38)  Serum Pro-Brain Natriuretic Peptide: 3636 pg/mL (12-02-22 @ 19:22)    CULTURES:       Culture - Urine (collected 12-01-22 @ 18:42)  Source: Clean Catch Clean Catch (Midstream)  Final Report (12-03-22 @ 09:19):    <10,000 CFU/mL Normal Urogenital Dulce    Physical Examination:  PULM: scattered crackles anteriorly   CVS: S1, S2 heard    RADIOLOGY REVIEWED  CXR: pulm congestion      TTE: < from: TTE with Doppler (w/Cont) (12.03.22 @ 10:41) >  ------------------------------------------------------------------------  PROCEDURE: Transthoracic echocardiogram with 2-D, M-Mode  and complete spectral and color flow Doppler. Verbal  consent was obtained for injection of  Ultrasonic Enhancing  Agent following a discussion of risks and benefits.  Following intravenous injection of Ultrasonic Enhancing  Agent, harmonic imaging was performed.  INDICATION: Edema, unspecified (R60.9)  ------------------------------------------------------------------------  Dimensions:    Normal Values:  LA:     3.3    2.0 - 4.0 cm  Ao:     2.7    2.0 - 3.8 cm  SEPTUM: 0.9    0.6 - 1.2 cm  PWT:    0.8    0.6 - 1.1 cm  LVIDd:  4.2    3.0 - 5.6cm  LVIDs:  2.3    1.8 - 4.0 cm  Derived variables:  LVMI: 73 g/m2  RWT: 0.38  Fractional short: 45 %  EF (Johnson Rule): 50 %  ------------------------------------------------------------------------  Observations:  Mitral Valve: Mitral annular calcification and calcified  mitral leaflets. Mild-moderate mitral regurgitation.  Aortic Valve/Aorta: Calcified trileaflet aortic valve with  normal opening. Minimal aortic regurgitation.  Peak left  ventricular outflow tract gradient equals 5 mm Hg, mean  gradient is equal to 3 mm Hg, LVOT velocity time integral  equals 23 cm.  Aortic Root: 2.7 cm.  Ascending Aorta: 2.7 cm.  LVOT diameter: 1.7 cm.  Left Atrium: Normal left atrium.  LA volume index = 28  cc/m2.  Left Ventricle: Mild segmental left ventricular systolic  dysfunction.  Akinesis of the apical septum, apex, apical  inferior wall.   Endocardial visualization enhanced with  intravenous injection of Ultrasonic Enhancing Agent  (Definity). No left ventricular thrombus. Normal left  ventricular internal dimensions and wall thickness. Mild  diastolic dysfunction (Stage I).  Right Heart: Normal right atrium. Normal right ventricular  size and function. Normal tricuspid valve. Minimal  tricuspid regurgitation. Normal pulmonic valve.  Pericardium/Pleura: Normal pericardium with no pericardial  effusion.  Hemodynamic: Estimated right atrial pressure is 8 mm Hg.  Estimated right ventricular systolic pressure equals 42 mm  Hg, assuming right atrial pressure equals 8 mm Hg,  consistent with mild pulmonary hypertension.  ------------------------------------------------------------------------  Conclusions:  1. Mild segmental left ventricular systolic dysfunction.  Akinesis of the apical septum, apex, apical inferior wall.   Endocardial visualization enhanced with intravenous  injection of Ultrasonic Enhancing Agent (Definity). No left  ventricular thrombus.  2. Mild diastolic dysfunction (Stage I).  3. Normal right ventricular size and function.  4. Estimated pulmonary artery systolic pressure equals 42  mm Hg, assuming right atrial pressure equals 8 mm Hg,  consistent with mild pulmonary pressures.  *** Compared with echocardiogram of 6/15/2016, wall motion  abnormality is noted.    < end of copied text >

## 2022-12-05 NOTE — CONSULT NOTE ADULT - SUBJECTIVE AND OBJECTIVE BOX
Tania Person is a 89 yo F with a hx of HTN, PAD with intermittent claudication, RBBB and low-grade atrial ectopy.  Hx. 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    Presented to ED after 1 week of abdominal distention associated with mild nausea and abdominal distention.  Saw Dr. Villalobos, and blood work checked, revealing lipase > 6000; referred to ED.    She denies previous abdominal surgeries denies constipation diarrhea. Denies recent trauma, fevers, chills, headache, dizziness, nausea, vomiting, dysuria, freq, hematuria, diarrhea, constipation, chest pain, shortness of breath, cough. (01 Dec 2022 16:31)    PMH:   Seizure disorder  GERD (gastroesophageal reflux disease)  Arthritis  Macular degeneration  Hard of hearing, bilateral      PSH:   S/P hip replacement, left      Medications:   chlorhexidine 2% Cloths 1 Application(s) Topical daily  clopidogrel Tablet 75 milliGRAM(s) Oral daily  lactated ringers. 1000 milliLiter(s) IV Continuous <Continuous>  metoprolol tartrate 12.5 milliGRAM(s) Oral two times a day  polyethylene glycol 3350 17 Gram(s) Oral daily      Allergies:  bacitracin (Unknown)  Flonase (Unknown)  fluorescein (Unknown)  iodine (Anaphylaxis)  iodine topical (Unknown)  latex (Unknown)  Levaquin (Unknown)  levofloxacin (Unknown)  morphine (Unknown)  Neosporin (Unknown)  shellfish (Anaphylaxis)  shellfish (Unknown)  sulfa drugs (Unknown)    FAMILY HISTORY:  Parents  of old age; mother had PPM in her 80s.  Brother  of old age  One daughter  of lung Ca.      Social History:  Smoking:  social when younger, d/c'd many years ago  Alcohol:  no  Drugs:  no        Vital Signs Last 24 Hrs  T(C): 36.3 (05 Dec 2022 08:25), Max: 36.8 (05 Dec 2022 00:24)  T(F): 97.3 (05 Dec 2022 08:25), Max: 98.3 (05 Dec 2022 04:13)  HR: 89 (05 Dec 2022 08:25) (82 - 107)  BP: 144/83 (05 Dec 2022 08:25) (121/67 - 157/63)  RR: 18 (05 Dec 2022 08:25) (18 - 24)  SpO2: 92% (05 Dec 2022 08:25) (90% - 95%)      EXAM:  GENERAL:  Alert, no distress  HEENT: Normocephalic, EOM intact, PERRLA  NECK: Normal carotid upstrokes, no bruit; No JVD  CHEST:  Clear to auscultation  HEART: PMI not displaced. Normal S1 and S2.  No murmur, rub or gallop.  ABDOMEN: Normal bowel sounds, no bruit. Soft, non-tender.  Liver and spleen not palpable; aorta not palpable.  EXT:  No edema, no varicosities, 2+ pulses in upper and lower extremities.  PSYCH:  A&Ox3, normal insight, no anxiety  NEURO: Non-focal  SKIN:  No rash       EKG  :   ST, RBBB with associated ST/T changes; no interval change      Labs:                        9.6    12.20 )-----------( 153      ( 05 Dec 2022 06:48 )             28.8     12-    136  |  97  |  11  ----------------------------<  107<H>  3.1<L>   |  25  |  0.67    Ca    9.0      05 Dec 2022 06:02  Phos  3.3     12-  Mg     2.0     12-    Serum Pro-Brain Natriuretic Peptide: 49300 pg/mL ( @ 22:38)  Serum Pro-Brain Natriuretic Peptide: 3636 pg/mL ( @ 19:22)      Trop T:  99 -> 324 -> 257 ->       CT Abdomen and Pelvis No Cont (22 @ 17:27)     INTERPRETATION:  CLINICAL INFORMATION: Elevated lipase. Evaluate for  pancreatic pathology.    COMPARISON: CT chest 2021 CT abdomen/pelvis 8/10/2012.    FINDINGS:  LOWER CHEST: Ground glass opacities at the lung bases with mild  interlobular septal thickening and bronchial wall thickening.     LIVER: Within normal limits.  BILE DUCTS: Normal caliber.  GALLBLADDER: Within normal limits.  SPLEEN: Within normal limits.  PANCREAS: Mild peripancreatic inflammatory stranding. No pancreatic or peripancreatic collections.  ADRENALS: Within normal limits.  KIDNEYS/URETERS: Bilateral renal cysts measuring up to 6.6 cm in the left kidney. No hydronephrosis or obstructive renal calculi.    BLADDER: Within normal limits.  REPRODUCTIVE ORGANS: Uterus and adnexa within normal limits.    BOWEL: Small hiatal hernia. Sigmoid diverticulosis without evidence of acute diverticulitis. No acute appendicitis. No bowel obstruction.  PERITONEUM: No ascites.  VESSELS: Atherosclerotic changes.  RETROPERITONEUM/LYMPH NODES: No lymphadenopathy.  ABDOMINAL WALL: Within normal limits.  BONES: Diffuse osteopenia. Stable moderate compression fracture of L4 and   moderate to severe compression fracture of T12. Status post ORIF of the   right hip. No acute osseous abnormality.    IMPRESSION:  Acute interstitial pancreatitis. No pancreatic or peripancreatic  collections.  Ground glass opacities, interlobular septal thickening and bronchial wall  thickening at the lung bases, which could be related to mild pulmonary  edema or infection.     < from: Xray Chest 1 View-PORTABLE IMMEDIATE (Xray Chest 1 View-PORTABLE IMMEDIATE .) (22 @ 19:17) >    ACC: [id number] EXAM:  XR CHEST PORTABLE IMMED 1V                          PROCEDURE DATE:  2022          INTERPRETATION:  TECHNIQUE: A single AP view of the chest was obtained.   Ordered time:   2022 7:17 PM  INFORMATION: Hypoxemia    FINDINGS:  The heart is not well assessed on an AP film.  There is worsening pulmonary edema when compared with the prior study.  There are trace bilateral pleural effusions.  There is no pneumothorax.    IMPRESSION:  Pulmonary edema with trace bilateral pleural effusions.    POLLO GORMAN MD; Attending Radiologist  This document has been electronically signed. Dec  3 2022 12:57PM    CEE RAGLAND MD; Attending Radiologist  This document has been electronically signed. Dec  1 2022  5:38PM       Luz Person is a 89 yo F with a hx of HTN, PAD with intermittent claudication, RBBB and low-grade atrial ectopy.  Hx. 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    Presented to ED after 1 week of abdominal distention associated with mild nausea and abdominal distention.  Saw Dr. Villalobos, and blood work checked, revealing lipase > 6000; referred to ED.    She denies previous abdominal surgeries denies constipation diarrhea. Denies recent trauma, fevers, chills, headache, dizziness, nausea, vomiting, dysuria, freq, hematuria, diarrhea, constipation, chest pain, shortness of breath, cough. (01 Dec 2022 16:31)    PMH:   Seizure disorder  GERD (gastroesophageal reflux disease)  Arthritis  Macular degeneration  Hard of hearing, bilateral      PSH:   S/P hip replacement, left      Medications:   chlorhexidine 2% Cloths 1 Application(s) Topical daily  clopidogrel Tablet 75 milliGRAM(s) Oral daily  lactated ringers. 1000 milliLiter(s) IV Continuous <Continuous>  metoprolol tartrate 12.5 milliGRAM(s) Oral two times a day  polyethylene glycol 3350 17 Gram(s) Oral daily      Allergies:  bacitracin (Unknown)  Flonase (Unknown)  fluorescein (Unknown)  iodine (Anaphylaxis)  iodine topical (Unknown)  latex (Unknown)  Levaquin (Unknown)  levofloxacin (Unknown)  morphine (Unknown)  Neosporin (Unknown)  shellfish (Anaphylaxis)  shellfish (Unknown)  sulfa drugs (Unknown)    FAMILY HISTORY:  Parents  of old age; mother had PPM in her 80s.  Brother  of old age  One daughter  of lung Ca.      Social History:  Smoking:  social when younger, d/c'd many years ago  Alcohol:  no  Drugs:  no        Vital Signs Last 24 Hrs  T(C): 36.3 (05 Dec 2022 08:25), Max: 36.8 (05 Dec 2022 00:24)  T(F): 97.3 (05 Dec 2022 08:25), Max: 98.3 (05 Dec 2022 04:13)  HR: 89 (05 Dec 2022 08:25) (82 - 107)  BP: 144/83 (05 Dec 2022 08:25) (121/67 - 157/63)  RR: 18 (05 Dec 2022 08:25) (18 - 24)  SpO2: 92% (05 Dec 2022 08:25) (90% - 95%)      EXAM:  GENERAL:  Alert, no distress  HEENT: Normocephalic, EOM intact, PERRLA  NECK: Normal carotid upstrokes, no bruit; No JVD  CHEST:  Clear to auscultation  HEART: PMI not displaced. Normal S1 and S2.  No murmur, rub or gallop.  ABDOMEN: Normal bowel sounds, no bruit. Soft, non-tender.  Liver and spleen not palpable; aorta not palpable.  EXT:  No edema, no varicosities, 2+ pulses in upper and lower extremities.  PSYCH:  A&Ox3, normal insight, no anxiety  NEURO: Non-focal  SKIN:  No rash       EKG  :   ST, RBBB with associated ST/T changes; no interval change      Labs:                        9.6    12.20 )-----------( 153      ( 05 Dec 2022 06:48 )             28.8     12    136  |  97  |  11  ----------------------------<  107<H>  3.1<L>   |  25  |  0.67    Ca    9.0      05 Dec 2022 06:02  Phos  3.3     12-  Mg     2.0     12-    Serum Pro-Brain Natriuretic Peptide: 82236 pg/mL ( @ 22:38)  Serum Pro-Brain Natriuretic Peptide: 3636 pg/mL ( @ 19:22)    Trop T:  99 -> 324 -> 257 ->         TTE with Doppler (w/Cont) (22 @ 10:41)   Patient name: LUZ TSANG  YOB: 1921   Age: 89 (F)   MR#: [id number]  Study Date: 12/3/2022  Study quality: Technically fair  ------------------------------------------------------------------------  Dimensions:    Normal Values:  LA:     3.3    2.0 - 4.0 cm  Ao:     2.7    2.0 - 3.8 cm  SEPTUM: 0.9    0.6 - 1.2 cm  PWT:    0.8    0.6 - 1.1 cm  LVIDd:  4.2    3.0 - 5.6cm  LVIDs:  2.3    1.8 - 4.0 cm  Derived variables:  LVMI: 73 g/m2  RWT: 0.38  Fractional short: 45 %  EF (Johnson Rule): 50 %  ------------------------------------------------------------------------  Observations:  Mitral Valve: Mitral annular calcification and calcified mitral leaflets. Mild-moderate mitral regurgitation.  Aortic Valve/Aorta: Calcified trileaflet aortic valve with normal opening. Minimal aortic regurgitation.  Peak left ventricular outflow tract gradient equals 5 mm Hg, mean gradient is equal to 3 mm Hg, LVOT velocity time integral equals 23 cm.  Aortic Root: 2.7 cm.  Ascending Aorta: 2.7 cm.  LVOT diameter: 1.7 cm.  Left Atrium: Normal left atrium.  LA volume index = 28 cc/m2.  Left Ventricle: Mild segmental left ventricular systolic dysfunction.  Akinesis of the apical septum, apex, apical inferior wall.   Endocardial visualization enhanced with intravenous injection of Ultrasonic Enhancing Agent (Definity). No left ventricular thrombus. Normal left ventricular internal dimensions and wall thickness. Mild diastolic dysfunction (Stage I).  Right Heart: Normal right atrium. Normal right ventricular size and function. Normal tricuspid valve. Minimal tricuspid regurgitation. Normal pulmonic valve.  Pericardium/Pleura: Normal pericardium with no pericardial effusion.  Hemodynamic: Estimated right atrial pressure is 8 mm Hg. Estimated right ventricular systolic pressure equals 42 mm Hg, assuming right atrial pressure equals 8 mm Hg, consistent with mild pulmonary hypertension.   ------------------------------------------------------------------------  Conclusions:  1. Mild segmental left ventricular systolic dysfunction. Akinesis of the apical septum, apex, apical inferior wall.  Endocardial visualization enhanced with intravenous injection of Ultrasonic Enhancing Agent (Definity). No left ventricular thrombus.  2. Mild diastolic dysfunction (Stage I).  3. Normal right ventricular size and function.  4. Estimated pulmonary artery systolic pressure equals 42 mmHg, assuming right atrial pressure equals 8 mm Hg, consistent with mild pulmonary pressures.  *** Compared with echocardiogram of 6/15/2016, wall motion abnormality is noted.  ------------------------------------------------------------------------  Confirmed on  12/3/2022 - 14:34:38 by ELIO Barry  ------------------------------------------------------------------------      CT Abdomen and Pelvis No Cont (22 @ 17:27)     INTERPRETATION:  CLINICAL INFORMATION: Elevated lipase. Evaluate for  pancreatic pathology.    COMPARISON: CT chest 2021 CT abdomen/pelvis 8/10/2012.    FINDINGS:  LOWER CHEST: Ground glass opacities at the lung bases with mild  interlobular septal thickening and bronchial wall thickening.     LIVER: Within normal limits.  BILE DUCTS: Normal caliber.  GALLBLADDER: Within normal limits.  SPLEEN: Within normal limits.  PANCREAS: Mild peripancreatic inflammatory stranding. No pancreatic or peripancreatic collections.  ADRENALS: Within normal limits.  KIDNEYS/URETERS: Bilateral renal cysts measuring up to 6.6 cm in the left kidney. No hydronephrosis or obstructive renal calculi.    BLADDER: Within normal limits.  REPRODUCTIVE ORGANS: Uterus and adnexa within normal limits.    BOWEL: Small hiatal hernia. Sigmoid diverticulosis without evidence of acute diverticulitis. No acute appendicitis. No bowel obstruction.  PERITONEUM: No ascites.  VESSELS: Atherosclerotic changes.  RETROPERITONEUM/LYMPH NODES: No lymphadenopathy.  ABDOMINAL WALL: Within normal limits.  BONES: Diffuse osteopenia. Stable moderate compression fracture of L4 and   moderate to severe compression fracture of T12. Status post ORIF of the   right hip. No acute osseous abnormality.    IMPRESSION:  Acute interstitial pancreatitis. No pancreatic or peripancreatic  collections.  Ground glass opacities, interlobular septal thickening and bronchial wall  thickening at the lung bases, which could be related to mild pulmonary  edema or infection.     < from: Xray Chest 1 View-PORTABLE IMMEDIATE (Xray Chest 1 View-PORTABLE IMMEDIATE .) (22 @ 19:17) >    ACC: [id number] EXAM:  XR CHEST PORTABLE IMMED 1V                          PROCEDURE DATE:  2022          INTERPRETATION:  TECHNIQUE: A single AP view of the chest was obtained.   Ordered time:   2022 7:17 PM  INFORMATION: Hypoxemia    FINDINGS:  The heart is not well assessed on an AP film.  There is worsening pulmonary edema when compared with the prior study.  There are trace bilateral pleural effusions.  There is no pneumothorax.    IMPRESSION:  Pulmonary edema with trace bilateral pleural effusions.    POLLO GORMAN MD; Attending Radiologist  This document has been electronically signed. Dec  3 2022 12:57PM    CEE RAGLAND MD; Attending Radiologist  This document has been electronically signed. Dec  1 2022  5:38PM       Luz TysonSriniYomaira is a 89 yo F with a hx of HTN, PAD with intermittent claudication, RBBB and low-grade atrial ectopy.  Hx. also includes macular degeneration & presbycusis.  Presented to ED after 1 week of abdominal distention associated with mild nausea and abdominal distention.  Saw Dr. Villalobos, and blood work checked, revealing lipase > 6000; referred to ED.  CT abd. c/w pancreatitis.  Started on IV fluid but became dyspneic.  CXR c/w pulm congestion/CHF; IV fluid stopped and started on Edecrin for diuresis with improvement.  At present, alert and OOB to chair.  Offers no specific complaints at present.  No CP, palps or dyspnea.  No abd. pain.      PMH:   Seizure disorder  GERD (gastroesophageal reflux disease)  Arthritis  Macular degeneration  Hard of hearing, bilateral      PSH:   S/P hip replacement, left      Medications:   chlorhexidine 2% Cloths 1 Application(s) Topical daily  clopidogrel Tablet 75 milliGRAM(s) Oral daily  lactated ringers. 1000 milliLiter(s) IV Continuous <Continuous>  metoprolol tartrate 12.5 milliGRAM(s) Oral two times a day  polyethylene glycol 3350 17 Gram(s) Oral daily      Allergies:  bacitracin (Unknown)  Flonase (Unknown)  fluorescein (Unknown)  iodine (Anaphylaxis)  iodine topical (Unknown)  latex (Unknown)  Levaquin (Unknown)  levofloxacin (Unknown)  morphine (Unknown)  Neosporin (Unknown)  shellfish (Anaphylaxis)  shellfish (Unknown)  sulfa drugs (Unknown)    FAMILY HISTORY:  Parents  of old age; mother had PPM in her 80s.  Brother  of old age  One daughter  of lung Ca.      Social History:  Smoking:  social when younger, d/c'd many years ago  Alcohol:  no  Drugs:  no        Vital Signs Last 24 Hrs  T(C): 36.3 (05 Dec 2022 08:25), Max: 36.8 (05 Dec 2022 00:24)  T(F): 97.3 (05 Dec 2022 08:25), Max: 98.3 (05 Dec 2022 04:13)  HR: 89 (05 Dec 2022 08:25) (82 - 107)  BP: 144/83 (05 Dec 2022 08:25) (121/67 - 157/63)  RR: 18 (05 Dec 2022 08:25) (18 - 24)  SpO2: 92% (05 Dec 2022 08:25) (90% - 95%)      EXAM:  GENERAL:  Alert, no distress  HEENT: Normocephalic, EOM intact, PERRLA  NECK: Normal carotid upstrokes, no bruit; No JVD  CHEST:  Clear to auscultation  HEART: PMI not displaced. Normal S1 and S2.  No murmur, rub or gallop.  ABDOMEN: Normal bowel sounds, no bruit. Soft, non-tender.  Liver and spleen not palpable; aorta not palpable.  EXT:  No edema, no varicosities, 2+ pulses in upper and lower extremities.  PSYCH:  A&Ox3, normal insight, no anxiety  NEURO: Non-focal  SKIN:  No rash       I&O's Summary  04 Dec 2022 07:01  -  05 Dec 2022 07:00  --------------------------------------------------------  IN: 1585 mL / OUT: 1400 mL / NET: 185 mL      EKG  :   ST, RBBB with associated ST/T changes      TELE:  NSR; 8 beat episode of WCT reported overnite      Labs:                      9.6    12.20 )-----------( 153      ( 05 Dec 2022 06:48 )             28.8     12  136  |  97  |  11  ----------------------------<  107<H>  3.1<L>   |  25  |  0.67    Ca    9.0      05 Dec 2022 06:02  Phos  3.3     12-  Mg     2.0     12-    Serum Pro-Brain Natriuretic Peptide: 61321 pg/mL ( @ 22:38)  Serum Pro-Brain Natriuretic Peptide: 3636 pg/mL ( @ 19:22)    Trop T:  99 -> 324 -> 257 ->         TTE with Doppler (w/Cont) (22 @ 10:41)   Patient name: LUZ TSANG  YOB: 1921   Age: 89 (F)   MR#: [id number]  Study Date: 12/3/2022  Study quality: Technically fair  ------------------------------------------------------------------------  Dimensions:    Normal Values:  LA:     3.3    2.0 - 4.0 cm  Ao:     2.7    2.0 - 3.8 cm  SEPTUM: 0.9    0.6 - 1.2 cm  PWT:    0.8    0.6 - 1.1 cm  LVIDd:  4.2    3.0 - 5.6cm  LVIDs:  2.3    1.8 - 4.0 cm  Derived variables:  LVMI: 73 g/m2  RWT: 0.38  Fractional short: 45 %  EF (Johnson Rule): 50 %  ------------------------------------------------------------------------  Observations:  Mitral Valve: Mitral annular calcification and calcified mitral leaflets. Mild-moderate mitral regurgitation.  Aortic Valve/Aorta: Calcified trileaflet aortic valve with normal opening. Minimal aortic regurgitation.  Peak left ventricular outflow tract gradient equals 5 mm Hg, mean gradient is equal to 3 mm Hg, LVOT velocity time integral equals 23 cm.  Aortic Root: 2.7 cm.  Ascending Aorta: 2.7 cm.  LVOT diameter: 1.7 cm.  Left Atrium: Normal left atrium.  LA volume index = 28 cc/m2.  Left Ventricle: Mild segmental left ventricular systolic dysfunction.  Akinesis of the apical septum, apex, apical inferior wall.   Endocardial visualization enhanced with intravenous injection of Ultrasonic Enhancing Agent (Definity). No left ventricular thrombus. Normal left ventricular internal dimensions and wall thickness. Mild diastolic dysfunction (Stage I).  Right Heart: Normal right atrium. Normal right ventricular size and function. Normal tricuspid valve. Minimal tricuspid regurgitation. Normal pulmonic valve.  Pericardium/Pleura: Normal pericardium with no pericardial effusion.  Hemodynamic: Estimated right atrial pressure is 8 mm Hg. Estimated right ventricular systolic pressure equals 42 mm Hg, assuming right atrial pressure equals 8 mm Hg, consistent with mild pulmonary hypertension.   ------------------------------------------------------------------------  Conclusions:  1. Mild segmental left ventricular systolic dysfunction. Akinesis of the apical septum, apex, apical inferior wall.  Endocardial visualization enhanced with intravenous injection of Ultrasonic Enhancing Agent (Definity). No left ventricular thrombus.  2. Mild diastolic dysfunction (Stage I).  3. Normal right ventricular size and function.  4. Estimated pulmonary artery systolic pressure equals 42 mmHg, assuming right atrial pressure equals 8 mm Hg, consistent with mild pulmonary pressures.  *** Compared with echocardiogram of 6/15/2016, wall motion abnormality is noted.  ------------------------------------------------------------------------  Confirmed on  12/3/2022 - 14:34:38 by ELIO Barry  ------------------------------------------------------------------------      CT Abdomen and Pelvis No Cont (22 @ 17:27)     INTERPRETATION:  CLINICAL INFORMATION: Elevated lipase. Evaluate for  pancreatic pathology.    COMPARISON: CT chest 2021 CT abdomen/pelvis 8/10/2012.    FINDINGS:  LOWER CHEST: Ground glass opacities at the lung bases with mild  interlobular septal thickening and bronchial wall thickening.     LIVER: Within normal limits.  BILE DUCTS: Normal caliber.  GALLBLADDER: Within normal limits.  SPLEEN: Within normal limits.  PANCREAS: Mild peripancreatic inflammatory stranding. No pancreatic or peripancreatic collections.  ADRENALS: Within normal limits.  KIDNEYS/URETERS: Bilateral renal cysts measuring up to 6.6 cm in the left kidney. No hydronephrosis or obstructive renal calculi.    BLADDER: Within normal limits.  REPRODUCTIVE ORGANS: Uterus and adnexa within normal limits.    BOWEL: Small hiatal hernia. Sigmoid diverticulosis without evidence of acute diverticulitis. No acute appendicitis. No bowel obstruction.  PERITONEUM: No ascites.  VESSELS: Atherosclerotic changes.  RETROPERITONEUM/LYMPH NODES: No lymphadenopathy.  ABDOMINAL WALL: Within normal limits.  BONES: Diffuse osteopenia. Stable moderate compression fracture of L4 and   moderate to severe compression fracture of T12. Status post ORIF of the   right hip. No acute osseous abnormality.    IMPRESSION:  Acute interstitial pancreatitis. No pancreatic or peripancreatic  collections.  Ground glass opacities, interlobular septal thickening and bronchial wall  thickening at the lung bases, which could be related to mild pulmonary  edema or infection.     < from: Xray Chest 1 View-PORTABLE IMMEDIATE (Xray Chest 1 View-PORTABLE IMMEDIATE .) (22 @ 19:17) >    ACC: [id number] EXAM:  XR CHEST PORTABLE IMMED 1V                          PROCEDURE DATE:  2022          INTERPRETATION:  TECHNIQUE: A single AP view of the chest was obtained.   Ordered time:   2022 7:17 PM  INFORMATION: Hypoxemia    FINDINGS:  The heart is not well assessed on an AP film.  There is worsening pulmonary edema when compared with the prior study.  There are trace bilateral pleural effusions.  There is no pneumothorax.    IMPRESSION:  Pulmonary edema with trace bilateral pleural effusions.    POLLO GORMAN MD; Attending Radiologist  This document has been electronically signed. Dec  3 2022 12:57PM    CEE RAGLAND MD; Attending Radiologist  This document has been electronically signed. Dec  1 2022  5:38PM

## 2022-12-05 NOTE — CONSULT NOTE ADULT - ASSESSMENT
Tania Person is a 89 yo F with a hx of HTN, PAD with intermittent claudication, RBBB and low-grade atrial ectopy.  Hx. 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  Presented to ED after 1 week of abdominal distention associated with mild nausea and abdominal distention.  Saw Dr. Villalobos, and blood work checked, revealing lipase > 6000; referred to ED.  CT scan c/w pancreatitis.  Developed dyspnea/presents with hypoxemic respiratory failure attributed to flash pulmonary edema after IVF administration for pancreatitis.    Plan:  - Transfer to telemetry unit for further monitoring  - Ethacrynic acid for diuresis given sulfa allergy which may preclude loop diuretics. Goal net neg 1-2L. SHe is refusing purewick, trend labs/oxygen requirement to assess for euvolemia  - Can consider NIPPV for additional fluid offloading and work of breathing  - Labs with hstrop 99 > 324. At this point in time, presentation is not consistent with ACS/type I NSTEMI, suspect type II from demand. Defer DAPT and hep gtt. Would opt for more conservative approach to probable underlying ischemic disease given age. No BB for now as patient is in Killips II, but may initiate metop tartrate 6.25 bid as she approaches euvolemia. Trend troponin to peak with q6-8h lab draws.   - F/u TTE  - Patient is DNR/DNI      I have personally seen and examined the patient.  I fully participated in the care of this patient.  I have made amendments to the documentation where necessary, and agree with the history, physical exam, and plan as documented by the Fellow.     Ms. Adrian reported no chest pain or dyspnea at the time of my evaluation. TTE shows LV apical WMA. Start statin if no contraindication. Close monitoring of volume status for signs of fluid overload and PRN diuresis weighting benefits and risk with concomitant pancreatitis. Tania Person is a 89 yo F with a hx of HTN, PAD with intermittent claudication, RBBB and low-grade atrial ectopy.  Hx. 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  Presented to ED after 1 week of abdominal distention associated with mild nausea and abdominal distention.  Saw Dr. Villalobos, and blood work checked, revealing lipase > 6000; referred to ED.  CT scan c/w pancreatitis.  Developed dyspnea/presents with hypoxemic respiratory failure attributed to flash pulmonary edema after IVF administration for pancreatitis.  Echo on 12/3 showed mild to moderate MR, mild LV dysfunction with EF 50% with akinesis of apical-septum, apex and apical inferior wall.  Mild diastolic LV dysfunction and mild elevation of PA pressure also noted.    Plan:  - Transfer to telemetry unit for further monitoring  - Ethacrynic acid for diuresis given sulfa allergy which may preclude loop diuretics. Goal net neg 1-2L. SHe is refusing purewick, trend labs/oxygen requirement to assess for euvolemia  - Can consider NIPPV for additional fluid offloading and work of breathing  - Labs with hstrop 99 > 324. At this point in time, presentation is not consistent with ACS/type I NSTEMI, suspect type II from demand. Defer DAPT and hep gtt. Would opt for more conservative approach to probable underlying ischemic disease given age. No BB for now as patient is in Killips II, but may initiate metop tartrate 6.25 bid as she approaches euvolemia. Trend troponin to peak with q6-8h lab draws.   - F/u TTE  - Patient is DNR/DNI      I have personally seen and examined the patient.  I fully participated in the care of this patient.  I have made amendments to the documentation where necessary, and agree with the history, physical exam, and plan as documented by the Fellow.     Ms. Adrian reported no chest pain or dyspnea at the time of my evaluation. TTE shows LV apical WMA. Start statin if no contraindication. Close monitoring of volume status for signs of fluid overload and PRN diuresis weighting benefits and risk with concomitant pancreatitis. Tania Person is a 89 yo F with a hx of HTN, PAD with intermittent claudication, RBBB and low-grade atrial ectopy.  Hx. 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  Presented to ED after 1 week of abdominal distention associated with mild nausea and abdominal distention.  Saw Dr. Villalobos, and blood work checked, revealing lipase > 6000; referred to ED.  CT scan c/w pancreatitis.  Developed dyspnea/presents with hypoxemic respiratory failure attributed to flash pulmonary edema after IVF administration for pancreatitis.  Echo on 12/3 showed mild to moderate MR, mild LV dysfunction with EF 50% with akinesis of apical-septum, apex and apical inferior wall.  Mild diastolic LV dysfunction and mild elevation of PA pressure also noted.  Dyspnea appears to have resolved at this time.      REC:  1.  Dyspnea, due to pulmonary congestion/CHF, seems improved.  Echo with mild systolic and diastolic dysfunction; mild to moderate MR noted.  - continue strict I and O  - CXR to be repeated today  - continue ethacrynic acid by po route at 25 mg. daily for now.  - Labs with hstrop 99 > 324.  Picture not consistent with ACS/type I NSTEMI; suspect type II MI related to demand ischemia.  Continue usual Plavix.  Continue telemetry    2.  Elevated D-dimer  - V/Q scan planned.    3.  Has known PAD, and segmental dysfunction seen on echo c/w LAD.   - would start statin if no contraindication.   - continue usual Plavix    4.  WCT, ?NSVT  - continue telemetry at this time.  - consider adding beta blocker    5.  Pancreatitis  - conservative management per GI consultant      Kale Khan M.D.  Office: [phone]  Tania Person is a 89 yo F with a hx of HTN, PAD with intermittent claudication, RBBB and low-grade atrial ectopy.  Hx. 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  Presented to ED after 1 week of abdominal distention associated with mild nausea and abdominal distention.  Saw Dr. Villalobos, and blood work checked, revealing lipase > 6000; referred to ED.  CT scan c/w pancreatitis.  Developed dyspnea/presents with hypoxemic respiratory failure attributed to flash pulmonary edema after IVF administration for pancreatitis.  Echo on 12/3 showed mild to moderate MR, mild LV dysfunction with EF 50% with akinesis of apical-septum, apex and apical inferior wall.  Mild diastolic LV dysfunction and mild elevation of PA pressure also noted.  Dyspnea appears to have resolved at this time.      REC:  1.  Dyspnea, due to pulmonary congestion/CHF, seems improved.  Echo with mild systolic and diastolic dysfunction; mild to moderate MR noted.  - continue strict I and O  - CXR to be repeated today  - continue ethacrynic acid by po route at 25 mg. daily for now.  - Labs with hstrop 99 > 324.  Picture not consistent with ACS/type I NSTEMI; suspect type II MI related to demand ischemia.  Continue usual Plavix.  Continue telemetry    2.  Elevated D-dimer  - V/Q scan planned.    3.  Has known PAD, and segmental dysfunction seen on echo c/w LAD.   - would start statin if no contraindication.   - continue usual Plavix    4.  WCT, ?NSVT  - continue telemetry at this time.  - consider adding beta blocker    5.  Pancreatitis  - conservative management per GI consultant      Discussed with daughter, Erika, and son, Geo, by phone.    Kale Khan M.D.  Office: [phone]

## 2022-12-05 NOTE — PROGRESS NOTE ADULT - ASSESSMENT
89y.o. Female abdominal pain   elevated lipase   pancreatitis, clinically improving    lipase down trending   CT non con with acute pancreatitis, no evidence of gallstones   triglycerides are normal   now with flash pulm edema   fluids held  reverted to NPO  cards/pulm following   trend lipase  pain meds prn  will follow    Advanced care planning was discussed with patient and family.  Advanced care planning forms were reviewed and discussed.  Risks, benefits and alternatives of gastroenterologic procedures were discussed in detail and all questions were answered.    30 minutes spent.

## 2022-12-05 NOTE — PROGRESS NOTE ADULT - SUBJECTIVE AND OBJECTIVE BOX
[facility]  Keegan Cox PA-C  [address] [place] [id number] [phone]    INTERVAL HPI/OVERNIGHT EVENTS:  Pt seen and examined, events noted  increasing abd distention ON, noted to be fluid overloaded  also with c/o hemoptysis  reverted to NPO, lipase improving slowly    MEDICATIONS  (STANDING):  chlorhexidine 2% Cloths 1 Application(s) Topical daily  clopidogrel Tablet 75 milliGRAM(s) Oral daily  lactated ringers. 1000 milliLiter(s) (75 mL/Hr) IV Continuous <Continuous>  ondansetron Injectable 4 milliGRAM(s) IV Push once  polyethylene glycol 3350 17 Gram(s) Oral daily    Vital Signs Last 24 Hrs  T(C): 36.7 (05 Dec 2022 12:06), Max: 36.8 (05 Dec 2022 00:24)  T(F): 98 (05 Dec 2022 12:06), Max: 98.3 (05 Dec 2022 04:13)  HR: 87 (05 Dec 2022 12:06) (82 - 107)  BP: 145/81 (05 Dec 2022 12:06) (121/67 - 157/63)  BP(mean): --  RR: 18 (05 Dec 2022 12:06) (18 - 24)  SpO2: 94% (05 Dec 2022 12:06) (89% - 95%)    Parameters below as of 05 Dec 2022 12:06  Patient On (Oxygen Delivery Method): nasal cannula  O2 Flow (L/min): 5    Physical:  General: A&Ox3. NAD.  Abdomen: Soft nondistended, nontender.    I&O's Summary    04 Dec 2022 07:01  -  05 Dec 2022 07:00  --------------------------------------------------------  IN: 1585 mL / OUT: 1400 mL / NET: 185 mL    05 Dec 2022 07:01  -  05 Dec 2022 12:18  --------------------------------------------------------  IN: 0 mL / OUT: 0 mL / NET: 0 mL        LABS:                                   9.6    12.20 )-----------( 153      ( 05 Dec 2022 06:48 )             28.8   12-05    136  |  97  |  11  ----------------------------<  107<H>  3.1<L>   |  25  |  0.67    Ca    9.0      05 Dec 2022 06:02  Phos  3.3     12-05  Mg     2.0     12-05

## 2022-12-05 NOTE — PROGRESS NOTE ADULT - ASSESSMENT
AMBLYOPIA OD__ LONGSTANDING CONTINUE TO MONITOR  89 y/o F with PMH of macular degeneration (appears to be legally blind), severe presbycusis, and HTN. Presents with abdominal distention, nausea, x1 week. Labs significant for elevated lipase, GI following for pancreatitis. Pulmonary called to consult for episode of hypoxia.

## 2022-12-05 NOTE — PROGRESS NOTE ADULT - SUBJECTIVE AND OBJECTIVE BOX
89yof PMHx of macular degeneration (appears to be legally blind), severe presbycusis, and hypertension presents after 1 week of abdominal distention mild nausea enlarging abdomen went to PMD Dr. Villalobos having routine blood work done for this issue and sent in today for evaluation after lipase greater than 6000.  Denies previous abdominal surgeries denies constipation diarrhea Patient has been resting comfortably.  Patient had an episode of shortness of breath 12/2. Started on ethacrynic acid with improvement in symptoms. Patient moved to [facility] for further monitoring. Patient states abdominal pain has improved. Patient with worsening hypoxia.     MEDICATIONS  (STANDING):  chlorhexidine 2% Cloths 1 Application(s) Topical daily  clopidogrel Tablet 75 milliGRAM(s) Oral daily  lactated ringers. 1000 milliLiter(s) (75 mL/Hr) IV Continuous <Continuous>  metoprolol tartrate 12.5 milliGRAM(s) Oral two times a day  polyethylene glycol 3350 17 Gram(s) Oral daily    MEDICATIONS  (PRN):          VITALS:   T(C): 36.7 (12-05-22 @ 12:06), Max: 36.8 (12-05-22 @ 00:24)  HR: 87 (12-05-22 @ 12:06) (75 - 107)  BP: 145/81 (12-05-22 @ 12:06) (121/67 - 157/63)  RR: 18 (12-05-22 @ 12:06) (18 - 24)  SpO2: 94% (12-05-22 @ 12:06) (89% - 95%)  Wt(kg): --    PHYSICAL EXAM:  GENERAL: NAD, well-developed  HEAD:  Atraumatic, Normocephalic  EYES: EOMI, PERRLA, conjunctiva and sclera clear  NECK: Supple, No JVD  CHEST/LUNG: Clear to auscultation bilaterally; No wheeze  HEART: Regular rate and rhythm; No murmurs, rubs, or gallops  ABDOMEN: Soft, mild diffuse tender, Nondistended; Bowel sounds present  EXTREMITIES:  2+ Peripheral Pulses, No clubbing, cyanosis, or edema  PSYCH: AAOx3  NEUROLOGY: non-focal  SKIN: No rashes or lesions    LABS:        CBC Full  -  ( 05 Dec 2022 06:48 )  WBC Count : 12.20 K/uL  RBC Count : 3.27 M/uL  Hemoglobin : 9.6 g/dL  Hematocrit : 28.8 %  Platelet Count - Automated : 153 K/uL  Mean Cell Volume : 88.1 fl  Mean Cell Hemoglobin : 29.4 pg  Mean Cell Hemoglobin Concentration : 33.3 gm/dL  Auto Neutrophil # : x  Auto Lymphocyte # : x  Auto Monocyte # : x  Auto Eosinophil # : x  Auto Basophil # : x  Auto Neutrophil % : x  Auto Lymphocyte % : x  Auto Monocyte % : x  Auto Eosinophil % : x  Auto Basophil % : x    12-05    136  |  97  |  11  ----------------------------<  107<H>  3.1<L>   |  25  |  0.67    Ca    9.0      05 Dec 2022 06:02  Phos  3.3     12-05  Mg     2.0     12-05            CAPILLARY BLOOD GLUCOSE          RADIOLOGY & ADDITIONAL TESTS:

## 2022-12-05 NOTE — PROGRESS NOTE ADULT - PROBLEM SELECTOR PLAN 3
Reports of hemoptysis overnight  -Suggest CTA chest to r/o PE  -Monitor respiratory status  -Monitor for increased hemoptysis. Reports of hemoptysis overnight  -Suggest VQ scan to r/o PE  -Monitor respiratory status  -Monitor for increased hemoptysis.

## 2022-12-05 NOTE — PROGRESS NOTE ADULT - PROBLEM SELECTOR PLAN 2
Pt with episode of hypoxia to mid 80s with ambulation 12/2, resolved with rest and 2LNC  -S/p RRT later in the evening for hypoxia, suspect 2nd to pulm edema. Repeat CXR with increased congestion, proBNP elevated, had been receiving IV fluids  -Elevated ddimer noted. Suggest CTA chest to r/o PE as patient also with episode of hemoptysis. Will d/w patient and family.   -LE duplex negative for DVT  -Keep O>I as tolerated   -Keep sats >90% with O2 as needed (currently 3-5LNC). Wean O2 as tolerated  -ABG with no CO2 retention, can c/w Bipap qHS & PRN for increased WOB. Pt with episode of hypoxia to mid 80s with ambulation 12/2, resolved with rest and 2LNC  -S/p RRT later in the evening for hypoxia, suspect 2nd to pulm edema. Repeat CXR with increased congestion, proBNP elevated, had been receiving IV fluids  -Elevated ddimer noted. D/w patient & family, hesitant to do CTA 2nd to allergy to iodine & shellfish. Suggest VQ scan to r/o PE.   -LE duplex negative for DVT  -Keep O>I as tolerated   -Keep sats >90% with O2 as needed (currently 3-5LNC). Wean O2 as tolerated  -ABG with no CO2 retention, can c/w Bipap qHS & PRN for increased WOB.

## 2022-12-05 NOTE — PROGRESS NOTE ADULT - PROBLEM SELECTOR PLAN 3
Patient found to have an O2 sat in the 80s on room air  increased to 93 on O2  found to have ground glass opacities in the RUL on CT  continue diuretics as needed  Patient seen by pulmonary  Patient is scheduled for a VQ exam

## 2022-12-06 LAB
ANION GAP SERPL CALC-SCNC: 16 MMOL/L — SIGNIFICANT CHANGE UP (ref 5–17)
BUN SERPL-MCNC: 12 MG/DL — SIGNIFICANT CHANGE UP (ref 7–23)
CALCIUM SERPL-MCNC: 8.7 MG/DL — SIGNIFICANT CHANGE UP (ref 8.4–10.5)
CHLORIDE SERPL-SCNC: 100 MMOL/L — SIGNIFICANT CHANGE UP (ref 96–108)
CO2 SERPL-SCNC: 22 MMOL/L — SIGNIFICANT CHANGE UP (ref 22–31)
CREAT SERPL-MCNC: 0.74 MG/DL — SIGNIFICANT CHANGE UP (ref 0.5–1.3)
EGFR: 72 ML/MIN/1.73M2 — SIGNIFICANT CHANGE UP
GLUCOSE SERPL-MCNC: 65 MG/DL — LOW (ref 70–99)
HCT VFR BLD CALC: 28.4 % — LOW (ref 34.5–45)
HGB BLD-MCNC: 9.1 G/DL — LOW (ref 11.5–15.5)
LIDOCAIN IGE QN: 968 U/L — HIGH (ref 7–60)
MCHC RBC-ENTMCNC: 28.7 PG — SIGNIFICANT CHANGE UP (ref 27–34)
MCHC RBC-ENTMCNC: 32 GM/DL — SIGNIFICANT CHANGE UP (ref 32–36)
MCV RBC AUTO: 89.6 FL — SIGNIFICANT CHANGE UP (ref 80–100)
NRBC # BLD: 0 /100 WBCS — SIGNIFICANT CHANGE UP (ref 0–0)
PLATELET # BLD AUTO: 191 K/UL — SIGNIFICANT CHANGE UP (ref 150–400)
POTASSIUM SERPL-MCNC: 3.9 MMOL/L — SIGNIFICANT CHANGE UP (ref 3.5–5.3)
POTASSIUM SERPL-SCNC: 3.9 MMOL/L — SIGNIFICANT CHANGE UP (ref 3.5–5.3)
RBC # BLD: 3.17 M/UL — LOW (ref 3.8–5.2)
RBC # FLD: 13.8 % — SIGNIFICANT CHANGE UP (ref 10.3–14.5)
SODIUM SERPL-SCNC: 138 MMOL/L — SIGNIFICANT CHANGE UP (ref 135–145)
WBC # BLD: 10.03 K/UL — SIGNIFICANT CHANGE UP (ref 3.8–10.5)
WBC # FLD AUTO: 10.03 K/UL — SIGNIFICANT CHANGE UP (ref 3.8–10.5)

## 2022-12-06 PROCEDURE — 99233 SBSQ HOSP IP/OBS HIGH 50: CPT

## 2022-12-06 RX ORDER — ETHACRYNIC ACID 25 MG/1
25 TABLET ORAL DAILY
Refills: 0 | Status: DISCONTINUED | OUTPATIENT
Start: 2022-12-06 | End: 2022-12-09

## 2022-12-06 RX ADMIN — ETHACRYNIC ACID 25 MILLIGRAM(S): 25 TABLET ORAL at 15:19

## 2022-12-06 RX ADMIN — POLYETHYLENE GLYCOL 3350 17 GRAM(S): 17 POWDER, FOR SOLUTION ORAL at 12:10

## 2022-12-06 RX ADMIN — Medication 12.5 MILLIGRAM(S): at 17:07

## 2022-12-06 RX ADMIN — CLOPIDOGREL BISULFATE 75 MILLIGRAM(S): 75 TABLET, FILM COATED ORAL at 12:12

## 2022-12-06 RX ADMIN — Medication 12.5 MILLIGRAM(S): at 05:34

## 2022-12-06 RX ADMIN — CHLORHEXIDINE GLUCONATE 1 APPLICATION(S): 213 SOLUTION TOPICAL at 12:13

## 2022-12-06 NOTE — DIETITIAN INITIAL EVALUATION ADULT - NSFNSGIIOFT_GEN_A_CORE
Denies nausea, vomiting, constipation, diarrhea. Reports last BM 12/5. Pt currently on bowel regimen (miralax).

## 2022-12-06 NOTE — DIETITIAN INITIAL EVALUATION ADULT - PERSON TAUGHT/METHOD
Discussed importance of adequate protein-energy consumption to meet estimated nutrient needs. Discussed low fat diet for pancreatitis when diet is advanced. Made recommendations for optimizing protein energy intake in setting of pancreatitis/low fat diet. Pt/visitors noted with good comprehension and made aware RD remains available for further questions/concerns./visitors at bedside/patient instructed

## 2022-12-06 NOTE — DIETITIAN INITIAL EVALUATION ADULT - ADD RECOMMEND
1) Advance Clear Liquid diet as medically feasible to Full Liquids -> Low fat diet.  2) Recommend Ensure Plus High Protein 2x/day when diet advances.  3) Consider a multivitamin, pending no medical contraindications, to aide in the prevention of micronutrient deficiencies.  4) Monitor PO intake, GI tolerance, skin integrity, labs, weight, and bowel movement regularity.   5) Honor food preferences as feasible. Assist with meals PRN and encourage PO intake.  6) RD remains available upon request and will follow-up per protocol.  7) malnutrition alert placed in chart

## 2022-12-06 NOTE — PROGRESS NOTE ADULT - PROBLEM SELECTOR PLAN 3
Reports of hemoptysis 12/4  -Pt refusing VQ scan  -Clinically improving  -No episodes of hemoptysis overnight  -Monitor respiratory status  -Monitor for increased hemoptysis.

## 2022-12-06 NOTE — DIETITIAN INITIAL EVALUATION ADULT - PERTINENT LABORATORY DATA
12-06    138  |  100  |  12  ----------------------------<  65<L>  3.9   |  22  |  0.74    Ca    8.7      06 Dec 2022 06:18  Phos  3.3     12-05  Mg     2.0     12-05

## 2022-12-06 NOTE — PROGRESS NOTE ADULT - PROBLEM SELECTOR PLAN 2
Pt with episode of hypoxia to mid 80s with ambulation 12/2, resolved with rest and 2LNC  -S/p RRT later in the evening for hypoxia, suspect 2nd to pulm edema. Repeat CXR with increased congestion, proBNP elevated, had been receiving IV fluids  -Elevated ddimer noted. Unable to do CTA 2nd to contrast allergy, pt refusing VQ scan  -LE duplex negative for DVT  -Dyspnea, O2 requirements improving. Denies hemoptysis overnight. Appears to be improving with diuresis  -Keep O>I as tolerated   -Keep sats >90% with O2 as needed (currently 2LNC). Wean O2 as tolerated  -ABG with no CO2 retention, can c/w Bipap qHS & PRN for increased WOB.

## 2022-12-06 NOTE — PROGRESS NOTE ADULT - SUBJECTIVE AND OBJECTIVE BOX
Alert, no distress.  No cardiac sxs; no CP, palps or dyspnea       Medications:  chlorhexidine 2% Cloths 1 Application(s) Topical daily  clopidogrel Tablet 75 milliGRAM(s) Oral daily  metoprolol tartrate 12.5 milliGRAM(s) Oral two times a day  polyethylene glycol 3350 17 Gram(s) Oral daily    PMH/PSH/FH/SH:  Unchanged    ROS:  Unchanged      Vitals:  T(C): 36.9 (12-06-22 @ 08:26), Max: 36.9 (12-06-22 @ 08:26)  HR: 86 (12-06-22 @ 09:36) (84 - 100)  BP: 131/66 (12-06-22 @ 08:26) (127/66 - 156/71)  RR: 18 (12-06-22 @ 08:26) (18 - 18)  SpO2: 92% (12-06-22 @ 09:36) (89% - 98%)        EXAM:  GENERAL:  Alert, no distress  HEENT: Normocephalic, EOM intact, PERRLA  NECK: Normal carotid upstrokes, no bruit; No JVD  CHEST:  Clear to auscultation  HEART: PMI not displaced. Normal S1 and S2.  No murmur, rub or gallop.  ABDOMEN: Normal bowel sounds, no bruit. Soft, non-tender.  Liver and spleen not palpable; aorta not palpable.  EXT:  No edema, no varicosities, 2+ pulses in upper and lower extremities.  PSYCH:  A&Ox3, normal insight, no anxiety  NEURO: Non-focal  SKIN:  No rash       TELE:         I&O's Summary  05 Dec 2022 07:01  -  06 Dec 2022 07:00  --------------------------------------------------------  IN: 0 mL / OUT: 200 mL / NET: -200 mL        LABS:                      9.1    10.03 )-----------( 191      ( 06 Dec 2022 06:18 )             28.4     12-06  138  |  100  |  12  ----------------------------<  65<L>  3.9   |  22  |  0.74    Ca    8.7      06 Dec 2022 06:18  Phos  3.3     12-05  Mg     2.0     12-05    Serum Pro-Brain Natriuretic Peptide: 17576 pg/mL (12-04-22 @ 22:38)  Serum Pro-Brain Natriuretic Peptide: 3636 pg/mL (12-02-22 @ 19:22)      ECHO   Conclusions:  1. Mild to moderate mitral insufficiency.  2. Mild segmental left ventricular systolic dysfunction. Akinesis of the apical septum, apex, apical inferior wall.  Endocardial visualization enhanced with intravenous injection of Ultrasonic Enhancing Agent (Definity). No left ventricular thrombus.  3. Mild diastolic dysfunction (Stage I).  4. Normal right ventricular size and function.  5. Estimated pulmonary artery systolic pressure equals 42 mmHg, assuming right atrial pressure equals 8 mm Hg, consistent with mild pulmonary pressures.  *** Compared with echocardiogram of 6/15/2016, wall motion abnormality is noted.  ------------------------------------------------------------------------  Confirmed on  12/3/2022 - 14:34:38 by ELIO Barry OOB to chair.  Alert, no distress.   Reports dyspnea last nite requiring O2 by facemask.  No CP or palps.       Medications:  chlorhexidine 2% Cloths 1 Application(s) Topical daily  clopidogrel Tablet 75 milliGRAM(s) Oral daily  metoprolol tartrate 12.5 milliGRAM(s) Oral two times a day  polyethylene glycol 3350 17 Gram(s) Oral daily    PMH/PSH/FH/SH:  Unchanged    ROS:  Unchanged      Vitals:  T(C): 36.9 (12-06-22 @ 08:26), Max: 36.9 (12-06-22 @ 08:26)  HR: 86 (12-06-22 @ 09:36) (84 - 100)  BP: 131/66 (12-06-22 @ 08:26) (127/66 - 156/71)  RR: 18 (12-06-22 @ 08:26) (18 - 18)  SpO2: 92% (12-06-22 @ 09:36) (89% - 98%)        EXAM:  GENERAL:  Alert, no distress  HEENT: Normocephalic, EOM intact, PERRLA  NECK: Normal carotid upstrokes, no bruit; No JVD  CHEST:  Clear to auscultation  HEART: PMI not displaced. Normal S1 and S2.  No murmur, rub or gallop.  ABDOMEN: Normal bowel sounds, no bruit. Soft, non-tender.  Liver and spleen not palpable; aorta not palpable.  EXT:  No edema, no varicosities, 2+ pulses in upper and lower extremities.  PSYCH:  A&Ox3, normal insight, no anxiety  NEURO: Non-focal  SKIN:  No rash       TELE:   NSR with occasional isolated APCs.      I&O's Summary  05 Dec 2022 07:01  -  06 Dec 2022 07:00  --------------------------------------------------------  IN: 0 mL / OUT: 200 mL / NET: -200 mL        LABS:                      9.1    10.03 )-----------( 191      ( 06 Dec 2022 06:18 )             28.4     12-06  138  |  100  |  12  ----------------------------<  65<L>  3.9   |  22  |  0.74    Ca    8.7      06 Dec 2022 06:18  Phos  3.3     12-05  Mg     2.0     12-05    Serum Pro-Brain Natriuretic Peptide: 25312 pg/mL (12-04-22 @ 22:38)  Serum Pro-Brain Natriuretic Peptide: 3636 pg/mL (12-02-22 @ 19:22)      ECHO   Conclusions:  1. Mild to moderate mitral insufficiency.  2. Mild segmental left ventricular systolic dysfunction. Akinesis of the apical septum, apex, apical inferior wall.  Endocardial visualization enhanced with intravenous injection of Ultrasonic Enhancing Agent (Definity). No left ventricular thrombus.  3. Mild diastolic dysfunction (Stage I).  4. Normal right ventricular size and function.  5. Estimated pulmonary artery systolic pressure equals 42 mmHg, assuming right atrial pressure equals 8 mm Hg, consistent with mild pulmonary pressures.  *** Compared with echocardiogram of 6/15/2016, wall motion abnormality is noted.  ------------------------------------------------------------------------  Confirmed on  12/3/2022 - 14:34:38 by ELIO Barry        Xray Chest 1 View- PORTABLE-Urgent (Xray Chest 1 View- PORTABLE-Urgent .) (12.05.22 @ 07:24)   CLINICAL INDICATION: SOB  TECHNIQUE: Single frontal, portable view of the chest was obtained.  COMPARISON: Chest x-ray 12/2/2022.  FINDINGS:  LINES/TUBES/SUPPORT DEVICES: None  LUNGS/PLEURA: Bilateral reticular opacities, grossly unchanged. Small bilateral pleural effusions. No pneumothorax  HEART AND MEDIASTINUM: Heart size is poorly assessed on this projection.  BONES: No acute bony abnormality.    IMPRESSION:  Interstitial edema with small bilateral pleural effusions.    NAVEEN MADRIGAL MD; Resident Radiologist  This document has been electronically signed.  SHAQUILLE VILLAFANA MD; Attending Radiologist  This document has been electronically signed. Dec  5 2022 12:31PM

## 2022-12-06 NOTE — PROGRESS NOTE ADULT - ASSESSMENT
Tania Person is a 89 yo F with a hx of HTN, PAD with intermittent claudication, RBBB and low-grade atrial ectopy.  Hx. 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  Presented to ED after 1 week of abdominal distention associated with mild nausea and abdominal distention.  Saw Dr. Villalobos, and blood work checked, revealing lipase > 6000; referred to ED.  CT scan c/w pancreatitis.  Developed dyspnea/presents with hypoxemic respiratory failure attributed to flash pulmonary edema after IVF administration for pancreatitis.  Echo on 12/3 showed mild to moderate MR, mild LV dysfunction with EF 50% with akinesis of apical-septum, apex and apical inferior wall.  Mild diastolic LV dysfunction and mild elevation of PA pressure also noted.  Dyspnea appears to have resolved at this time.      REC:  1.  Dyspnea, due to pulmonary congestion/CHF, seems improved.  Echo with mild systolic and diastolic dysfunction; mild to moderate MR noted.  - continue strict I and O  - CXR to be repeated today  - continue ethacrynic acid by po route at 25 mg. daily for now.  - Labs with hstrop 99 > 324.  Picture not consistent with ACS/type I NSTEMI; suspect type II MI related to demand ischemia.  Continue usual Plavix.  Continue telemetry    2.  Elevated D-dimer  - V/Q scan planned.    3.  Has known PAD, and segmental dysfunction seen on echo c/w LAD.   - would start statin if no contraindication.   - continue usual Plavix    4.  WCT, ?NSVT  - continue telemetry at this time.  - consider adding beta blocker    5.  Pancreatitis  - conservative management per GI consultant          Kale Khan M.D.  Office: [phone]    Tania Person is a 89 yo F with a hx of HTN, PAD with intermittent claudication, RBBB and low-grade atrial ectopy.  Hx. 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  Presented to ED after 1 week of abdominal distention associated with mild nausea and abdominal distention.  Saw Dr. Villalobos, and blood work checked, revealing lipase > 6000; referred to ED.  CT scan c/w pancreatitis.  Developed dyspnea with hypoxemic respiratory failure attributed to flash pulmonary edema after IVF administration for pancreatitis.  Echo on 12/3 showed mild to moderate MR, mild LV dysfunction with EF 50% with akinesis of apical-septum, apex and apical inferior wall.  Mild diastolic LV dysfunction and mild elevation of PA pressure also noted.  CXR yesterday shows persistence of pulm. congestion      REC:  1.  Dyspnea, due to pulmonary congestion/CHF, improving.  Echo with mild systolic and diastolic dysfunction; mild to moderate MR noted.  - continue strict I and O  - resume ethacrynic acid by po route at 25 mg. daily.  - continue usual Plavix.  - continue telemetry    2.  Has known PAD, and segmental dysfunction seen on echo c/w LAD.   - would start statin if no contraindication.   - continue usual Plavix    3.  WCT, ?NSVT; no recurrence.  - continue telemetry at this time.  - continue metoprolol     4.  Pancreatitis  - per GI, to resume clear liquids.    Discussed with daughter in law, Dr. Rukhsana Harris, at bedside.      Kale Khan M.D.  Office: [phone]

## 2022-12-06 NOTE — DIETITIAN INITIAL EVALUATION ADULT - OTHER INFO
Reports  lbs. Endorses wt gain to 120 lbs, may be attributable to good PO intake vs fluid retention.  Dosing wt: 118 lbs (12-02)  Wt history per chart: 119.9 lbs (12-01, stated), 130 lbs (12/12/21, stated), 105 lbs (03/28/2020). RD to continue to monitor weight trends as able/available.

## 2022-12-06 NOTE — DIETITIAN INITIAL EVALUATION ADULT - PERTINENT MEDS FT
MEDICATIONS  (STANDING):  chlorhexidine 2% Cloths 1 Application(s) Topical daily  clopidogrel Tablet 75 milliGRAM(s) Oral daily  ethacrynic acid 25 milliGRAM(s) Oral daily  metoprolol tartrate 12.5 milliGRAM(s) Oral two times a day  polyethylene glycol 3350 17 Gram(s) Oral daily

## 2022-12-06 NOTE — DIETITIAN INITIAL EVALUATION ADULT - ORAL INTAKE PTA/DIET HISTORY
Pt/family reports pt with good appetite/PO intake PTA at baseline, eats very well, had 1 day of poor PO intake PTA; Was not following any therapeutic diet PTA at [facility]. Diet recall include: meat/steak, potatoes, soups, chocolate ice cream. Tolerates normal textures.  Confirms shellfish allergy.   Noted pt with latex allergy as per chart - pt denies having allergy to foods from the list of "latex-reactive foods".

## 2022-12-06 NOTE — DIETITIAN INITIAL EVALUATION ADULT - REASON INDICATOR FOR ASSESSMENT
Pt seen for NPO/Inadequate diet order >3 days.  Source: pt, pt's family at bedside, medical record. Chart reviewed, events noted.

## 2022-12-06 NOTE — PROGRESS NOTE ADULT - SUBJECTIVE AND OBJECTIVE BOX
Follow-up Pulm Progress Note    OOB to chair  New dry cough, no hemoptysis overnight per RN  O2 sats 94% on 2LNC, wore Bipap overnight  Denies CP, SOB    Medications:  MEDICATIONS  (STANDING):  chlorhexidine 2% Cloths 1 Application(s) Topical daily  clopidogrel Tablet 75 milliGRAM(s) Oral daily  metoprolol tartrate 12.5 milliGRAM(s) Oral two times a day  polyethylene glycol 3350 17 Gram(s) Oral daily    Vital Signs Last 24 Hrs  T(C): 36.9 (06 Dec 2022 08:26), Max: 36.9 (06 Dec 2022 08:26)  T(F): 98.4 (06 Dec 2022 08:26), Max: 98.4 (06 Dec 2022 08:26)  HR: 86 (06 Dec 2022 09:36) (84 - 100)  BP: 131/66 (06 Dec 2022 08:26) (127/66 - 156/71)  BP(mean): --  RR: 18 (06 Dec 2022 08:26) (18 - 18)  SpO2: 92% (06 Dec 2022 09:36) (89% - 98%)    Parameters below as of 06 Dec 2022 08:26  Patient On (Oxygen Delivery Method): nasal cannula  O2 Flow (L/min): 4    12-05 @ 07:01  -  12-06 @ 07:00  --------------------------------------------------------  IN: 0 mL / OUT: 200 mL / NET: -200 mL    LABS:                        9.1    10.03 )-----------( 191      ( 06 Dec 2022 06:18 )             28.4     12-06    138  |  100  |  12  ----------------------------<  65<L>  3.9   |  22  |  0.74    Ca    8.7      06 Dec 2022 06:18  Phos  3.3     12-05  Mg     2.0     12-05      Serum Pro-Brain Natriuretic Peptide: 1088 pg/mL (12-04-22 @ 22:38)    CULTURES:   Culture - Urine (collected 12-01-22 @ 18:42)  Source: Clean Catch Clean Catch (Midstream)  Final Report (12-03-22 @ 09:19):    <10,000 CFU/mL Normal Urogenital Dulce    Physical Examination:  PULM: Decreased at bases, few scattered crackles   CVS: S1, S2 heard    RADIOLOGY REVIEWED  CXR: 12/7 pulm congestion      TTE: < from: TTE with Doppler (w/Cont) (12.03.22 @ 10:41) >  ------------------------------------------------------------------------  PROCEDURE: Transthoracic echocardiogram with 2-D, M-Mode  and complete spectral and color flow Doppler. Verbal  consent was obtained for injection of  Ultrasonic Enhancing  Agent following a discussion of risks and benefits.  Following intravenous injection of Ultrasonic Enhancing  Agent, harmonic imaging was performed.  INDICATION: Edema, unspecified (R60.9)  ------------------------------------------------------------------------  Dimensions:    Normal Values:  LA:     3.3    2.0 - 4.0 cm  Ao:     2.7    2.0 - 3.8 cm  SEPTUM: 0.9    0.6 - 1.2 cm  PWT:    0.8    0.6 - 1.1 cm  LVIDd:  4.2    3.0 - 5.6cm  LVIDs:  2.3    1.8 - 4.0 cm  Derived variables:  LVMI: 73 g/m2  RWT: 0.38  Fractional short: 45 %  EF (Johnson Rule): 50 %  ------------------------------------------------------------------------  Observations:  Mitral Valve: Mitral annular calcification and calcified  mitral leaflets. Mild-moderate mitral regurgitation.  Aortic Valve/Aorta: Calcified trileaflet aortic valve with  normal opening. Minimal aortic regurgitation.  Peak left  ventricular outflow tract gradient equals 5 mm Hg, mean  gradient is equal to 3 mm Hg, LVOT velocity time integral  equals 23 cm.  Aortic Root: 2.7 cm.  Ascending Aorta: 2.7 cm.  LVOT diameter: 1.7 cm.  Left Atrium: Normal left atrium.  LA volume index = 28  cc/m2.  Left Ventricle: Mild segmental left ventricular systolic  dysfunction.  Akinesis of the apical septum, apex, apical  inferior wall.   Endocardial visualization enhanced with  intravenous injection of Ultrasonic Enhancing Agent  (Definity). No left ventricular thrombus. Normal left  ventricular internal dimensions and wall thickness. Mild  diastolic dysfunction (Stage I).  Right Heart: Normal right atrium. Normal right ventricular  size and function. Normal tricuspid valve. Minimal  tricuspid regurgitation. Normal pulmonic valve.  Pericardium/Pleura: Normal pericardium with no pericardial  effusion.  Hemodynamic: Estimated right atrial pressure is 8 mm Hg.  Estimated right ventricular systolic pressure equals 42 mm  Hg, assuming right atrial pressure equals 8 mm Hg,  consistent with mild pulmonary hypertension.  ------------------------------------------------------------------------  Conclusions:  1. Mild segmental left ventricular systolic dysfunction.  Akinesis of the apical septum, apex, apical inferior wall.   Endocardial visualization enhanced with intravenous  injection of Ultrasonic Enhancing Agent (Definity). No left  ventricular thrombus.  2. Mild diastolic dysfunction (Stage I).  3. Normal right ventricular size and function.  4. Estimated pulmonary artery systolic pressure equals 42  mm Hg, assuming right atrial pressure equals 8 mm Hg,  consistent with mild pulmonary pressures.  *** Compared with echocardiogram of 6/15/2016, wall motion  abnormality is noted.    < end of copied text >

## 2022-12-06 NOTE — PROGRESS NOTE ADULT - SUBJECTIVE AND OBJECTIVE BOX
89yof PMHx of macular degeneration (appears to be legally blind), severe presbycusis, and hypertension presents after 1 week of abdominal distention mild nausea enlarging abdomen went to PMD Dr. Villalobos having routine blood work done for this issue and sent in today for evaluation after lipase greater than 6000.  Denies previous abdominal surgeries denies constipation diarrhea Patient has been resting comfortably.  Patient had an episode of shortness of breath 12/2. Started on ethacrynic acid with improvement in symptoms. Patient moved to [facility] for further monitoring. Patient states abdominal pain has improved. Patient O2 sats have been stable. Patient refused a VQ scan    MEDICATIONS  (STANDING):  chlorhexidine 2% Cloths 1 Application(s) Topical daily  clopidogrel Tablet 75 milliGRAM(s) Oral daily  metoprolol tartrate 12.5 milliGRAM(s) Oral two times a day  polyethylene glycol 3350 17 Gram(s) Oral daily    MEDICATIONS  (PRN):          VITALS:   T(C): 36.9 (12-06-22 @ 08:26), Max: 36.9 (12-06-22 @ 08:26)  HR: 85 (12-06-22 @ 08:26) (84 - 100)  BP: 131/66 (12-06-22 @ 08:26) (127/66 - 156/71)  RR: 18 (12-06-22 @ 08:26) (18 - 18)  SpO2: 95% (12-06-22 @ 08:26) (89% - 98%)  Wt(kg): --    PHYSICAL EXAM:  GENERAL: NAD, well-developed  HEAD:  Atraumatic, Normocephalic  EYES: EOMI, PERRLA, conjunctiva and sclera clear  NECK: Supple, No JVD  CHEST/LUNG: Clear to auscultation bilaterally; No wheeze  HEART: Regular rate and rhythm; No murmurs, rubs, or gallops  ABDOMEN: Soft, mild diffuse tender, Nondistended; Bowel sounds present  EXTREMITIES:  2+ Peripheral Pulses, No clubbing, cyanosis, or edema  PSYCH: AAOx3  NEUROLOGY: non-focal  SKIN: No rashes or lesions    LABS:        CBC Full  -  ( 06 Dec 2022 06:18 )  WBC Count : 10.03 K/uL  RBC Count : 3.17 M/uL  Hemoglobin : 9.1 g/dL  Hematocrit : 28.4 %  Platelet Count - Automated : 191 K/uL  Mean Cell Volume : 89.6 fl  Mean Cell Hemoglobin : 28.7 pg  Mean Cell Hemoglobin Concentration : 32.0 gm/dL  Auto Neutrophil # : x  Auto Lymphocyte # : x  Auto Monocyte # : x  Auto Eosinophil # : x  Auto Basophil # : x  Auto Neutrophil % : x  Auto Lymphocyte % : x  Auto Monocyte % : x  Auto Eosinophil % : x  Auto Basophil % : x    12-06    138  |  100  |  12  ----------------------------<  65<L>  3.9   |  22  |  0.74    Ca    8.7      06 Dec 2022 06:18  Phos  3.3     12-05  Mg     2.0     12-05            CAPILLARY BLOOD GLUCOSE          RADIOLOGY & ADDITIONAL TESTS:

## 2022-12-06 NOTE — PROGRESS NOTE ADULT - SUBJECTIVE AND OBJECTIVE BOX
[facility]  Keegan Cox PA-C  [address] [place] [id number] [phone]    INTERVAL HPI/OVERNIGHT EVENTS:  Pt seen and examined, events noted  lipase trending down  abdominal pain is improved  tolerating CLD  3 BMs yesterday     MEDICATIONS  (STANDING):  chlorhexidine 2% Cloths 1 Application(s) Topical daily  clopidogrel Tablet 75 milliGRAM(s) Oral daily  lactated ringers. 1000 milliLiter(s) (75 mL/Hr) IV Continuous <Continuous>  ondansetron Injectable 4 milliGRAM(s) IV Push once  polyethylene glycol 3350 17 Gram(s) Oral daily    Vital Signs Last 24 Hrs  T(C): 36.8 (06 Dec 2022 12:00), Max: 36.9 (06 Dec 2022 08:26)  T(F): 98.2 (06 Dec 2022 12:00), Max: 98.4 (06 Dec 2022 08:26)  HR: 90 (06 Dec 2022 12:00) (84 - 100)  BP: 124/65 (06 Dec 2022 12:00) (124/65 - 156/71)  BP(mean): --  RR: 18 (06 Dec 2022 12:00) (18 - 18)  SpO2: 93% (06 Dec 2022 12:00) (92% - 98%)    Parameters below as of 06 Dec 2022 12:00  Patient On (Oxygen Delivery Method): nasal cannula  O2 Flow (L/min): 2    Physical:  General: A&Ox3. NAD.  Abdomen: Soft nondistended, nontender.    I&O's Summary    05 Dec 2022 07:01  -  06 Dec 2022 07:00  --------------------------------------------------------  IN: 0 mL / OUT: 200 mL / NET: -200 mL    06 Dec 2022 07:01  -  06 Dec 2022 12:27  --------------------------------------------------------  IN: 0 mL / OUT: 0 mL / NET: 0 mL        LABS:                        9.1    10.03 )-----------( 191      ( 06 Dec 2022 06:18 )             28.4   12-06    138  |  100  |  12  ----------------------------<  65<L>  3.9   |  22  |  0.74    Ca    8.7      06 Dec 2022 06:18  Phos  3.3     12-05  Mg     2.0     12-05

## 2022-12-06 NOTE — DIETITIAN INITIAL EVALUATION ADULT - REASON FOR ADMISSION
Acute pancreatitis without infection or necrosis    Per chart: pt is a 89yo F with PMHx of macular degeneration (appears to be legally blind), severe presbycusis, and hypertension presents after 1 week of abdominal distention mild nausea enlarging abdomen went to PMD. Found to have pancreatitis.

## 2022-12-06 NOTE — PROGRESS NOTE ADULT - ASSESSMENT
89y.o. Female abdominal pain   elevated lipase   pancreatitis, clinically improving    lipase down trending   CT non con with acute pancreatitis, no evidence of gallstones   triglycerides are normal   now with flash pulm edema   fluids held  CLD, advance as tolerated  cards/pulm following   trend lipase  pain meds prn  will follow    Advanced care planning was discussed with patient and family.  Advanced care planning forms were reviewed and discussed.  Risks, benefits and alternatives of gastroenterologic procedures were discussed in detail and all questions were answered.    30 minutes spent.

## 2022-12-06 NOTE — PROGRESS NOTE ADULT - PROBLEM SELECTOR PLAN 3
Patient found to have an O2 sat in the 80s on room air  increased to 93 on O2  found to have ground glass opacities in the RUL on CT  continue diuretics as needed  Patient seen by pulmonary  refused VQ. continue to monitor O2 sats

## 2022-12-07 LAB
ANION GAP SERPL CALC-SCNC: 9 MMOL/L — SIGNIFICANT CHANGE UP (ref 5–17)
BUN SERPL-MCNC: 12 MG/DL — SIGNIFICANT CHANGE UP (ref 7–23)
CALCIUM SERPL-MCNC: 8.8 MG/DL — SIGNIFICANT CHANGE UP (ref 8.4–10.5)
CHLORIDE SERPL-SCNC: 100 MMOL/L — SIGNIFICANT CHANGE UP (ref 96–108)
CO2 SERPL-SCNC: 27 MMOL/L — SIGNIFICANT CHANGE UP (ref 22–31)
CREAT SERPL-MCNC: 0.72 MG/DL — SIGNIFICANT CHANGE UP (ref 0.5–1.3)
EGFR: 74 ML/MIN/1.73M2 — SIGNIFICANT CHANGE UP
GLUCOSE SERPL-MCNC: 96 MG/DL — SIGNIFICANT CHANGE UP (ref 70–99)
LIDOCAIN IGE QN: 1271 U/L — HIGH (ref 7–60)
POTASSIUM SERPL-MCNC: 3.7 MMOL/L — SIGNIFICANT CHANGE UP (ref 3.5–5.3)
POTASSIUM SERPL-SCNC: 3.7 MMOL/L — SIGNIFICANT CHANGE UP (ref 3.5–5.3)
RAPID RVP RESULT: SIGNIFICANT CHANGE UP
SARS-COV-2 RNA SPEC QL NAA+PROBE: SIGNIFICANT CHANGE UP
SODIUM SERPL-SCNC: 136 MMOL/L — SIGNIFICANT CHANGE UP (ref 135–145)

## 2022-12-07 PROCEDURE — 99232 SBSQ HOSP IP/OBS MODERATE 35: CPT | Mod: GC

## 2022-12-07 RX ADMIN — CLOPIDOGREL BISULFATE 75 MILLIGRAM(S): 75 TABLET, FILM COATED ORAL at 11:05

## 2022-12-07 RX ADMIN — Medication 12.5 MILLIGRAM(S): at 17:02

## 2022-12-07 RX ADMIN — ETHACRYNIC ACID 25 MILLIGRAM(S): 25 TABLET ORAL at 05:17

## 2022-12-07 RX ADMIN — Medication 12.5 MILLIGRAM(S): at 05:17

## 2022-12-07 RX ADMIN — CHLORHEXIDINE GLUCONATE 1 APPLICATION(S): 213 SOLUTION TOPICAL at 11:06

## 2022-12-07 NOTE — PROVIDER CONTACT NOTE (OTHER) - ACTION/TREATMENT ORDERED:
No intervention at this time. Continue to monitor.
LARRY De La Vega made aware with an order of CXR 1 view portable in AM and to continue monitor the patient.
Provider evaluated patient at bedside , hold IVF until reassessment, 50 mg Edecrin IVPB, NPO except meds with ice chips/sips of water, and serum PBNP lab ordered.
Provider notified, Order BMP, Magnesium, Phosphorus, BNP. Tele monitoring continued.

## 2022-12-07 NOTE — PROGRESS NOTE ADULT - PROBLEM SELECTOR PLAN 2
Pt with episode of hypoxia to mid 80s with ambulation 12/2, resolved with rest and 2LNC  -S/p RRT later in the evening for hypoxia, suspect 2nd to pulm edema. Repeat CXR with increased congestion, proBNP elevated, had been receiving IV fluids  -Elevated ddimer noted. Unable to do CTA 2nd to contrast allergy, pt refusing VQ scan  -LE duplex negative for DVT  -Dyspnea, O2 requirements improving with diuresis  -Keep O>I as tolerated   -Keep sats >90% with O2 as needed (currently 1LNC). Wean O2 as tolerated  -ABG with no CO2 retention, can c/w Bipap qHS & PRN for increased WOB. Refused overnight, if refuses again tonight would d/c Bipap.

## 2022-12-07 NOTE — PROGRESS NOTE ADULT - SUBJECTIVE AND OBJECTIVE BOX
89yof PMHx of macular degeneration (appears to be legally blind), severe presbycusis, and hypertension presents after 1 week of abdominal distention mild nausea enlarging abdomen went to PMD Dr. Villalobos having routine blood work done for this issue and sent in today for evaluation after lipase greater than 6000.  Denies previous abdominal surgeries denies constipation diarrhea Patient has been resting comfortably.  Patient had an episode of shortness of breath 12/2. Started on ethacrynic acid with improvement in symptoms. Patient moved to [facility] for further monitoring. Patient states abdominal pain has improved. Patient O2 sats have been stable.     MEDICATIONS  (STANDING):  chlorhexidine 2% Cloths 1 Application(s) Topical daily  clopidogrel Tablet 75 milliGRAM(s) Oral daily  ethacrynic acid 25 milliGRAM(s) Oral daily  metoprolol tartrate 12.5 milliGRAM(s) Oral two times a day  polyethylene glycol 3350 17 Gram(s) Oral daily    MEDICATIONS  (PRN):          VITALS:   T(C): 36.9 (12-07-22 @ 12:03), Max: 37.4 (12-06-22 @ 20:31)  HR: 82 (12-07-22 @ 17:02) (82 - 95)  BP: 143/76 (12-07-22 @ 16:36) (115/60 - 147/73)  RR: 18 (12-07-22 @ 12:03) (18 - 20)  SpO2: 95% (12-07-22 @ 17:02) (86% - 95%)  Wt(kg): --    PHYSICAL EXAM:  GENERAL: NAD, well-developed  HEAD:  Atraumatic, Normocephalic  EYES: EOMI, PERRLA, conjunctiva and sclera clear  NECK: Supple, No JVD  CHEST/LUNG: Clear to auscultation bilaterally; No wheeze  HEART: Regular rate and rhythm; No murmurs, rubs, or gallops  ABDOMEN: Soft, mild diffuse tender, Nondistended; Bowel sounds present  EXTREMITIES:  2+ Peripheral Pulses, No clubbing, cyanosis, or edema  PSYCH: AAOx3  NEUROLOGY: non-focal  SKIN: No rashes or lesions    LABS:        CBC Full  -  ( 06 Dec 2022 06:18 )  WBC Count : 10.03 K/uL  RBC Count : 3.17 M/uL  Hemoglobin : 9.1 g/dL  Hematocrit : 28.4 %  Platelet Count - Automated : 191 K/uL  Mean Cell Volume : 89.6 fl  Mean Cell Hemoglobin : 28.7 pg  Mean Cell Hemoglobin Concentration : 32.0 gm/dL  Auto Neutrophil # : x  Auto Lymphocyte # : x  Auto Monocyte # : x  Auto Eosinophil # : x  Auto Basophil # : x  Auto Neutrophil % : x  Auto Lymphocyte % : x  Auto Monocyte % : x  Auto Eosinophil % : x  Auto Basophil % : x    12-07    136  |  100  |  12  ----------------------------<  96  3.7   |  27  |  0.72    Ca    8.8      07 Dec 2022 06:52            CAPILLARY BLOOD GLUCOSE          RADIOLOGY & ADDITIONAL TESTS:

## 2022-12-07 NOTE — PROVIDER CONTACT NOTE (OTHER) - REASON
PAT x5secs -170s on telemetry
Patient complains of increased abdominal discomfort/fullness, orthopnea, and dry cough
Pt. Oxygen saturation on room air at rest: 86%
Tele event Vtach for 8 Beats

## 2022-12-07 NOTE — PROGRESS NOTE ADULT - SUBJECTIVE AND OBJECTIVE BOX
[facility]  Keegan Cox PA-C  [address] [place] [id number] [phone]    INTERVAL HPI/OVERNIGHT EVENTS:  Pt seen and examined, events noted  bump in lipase noted  however pt tolerating reg diet with little to no abdominal pain    MEDICATIONS  (STANDING):  chlorhexidine 2% Cloths 1 Application(s) Topical daily  clopidogrel Tablet 75 milliGRAM(s) Oral daily  lactated ringers. 1000 milliLiter(s) (75 mL/Hr) IV Continuous <Continuous>  ondansetron Injectable 4 milliGRAM(s) IV Push once  polyethylene glycol 3350 17 Gram(s) Oral daily    Vital Signs Last 24 Hrs  T(C): 36.9 (07 Dec 2022 12:03), Max: 37.4 (06 Dec 2022 20:31)  T(F): 98.5 (07 Dec 2022 12:03), Max: 99.4 (06 Dec 2022 20:31)  HR: 88 (07 Dec 2022 12:03) (84 - 105)  BP: 115/60 (07 Dec 2022 12:03) (115/60 - 158/73)  BP(mean): --  RR: 18 (07 Dec 2022 12:03) (18 - 20)  SpO2: 95% (07 Dec 2022 12:03) (86% - 96%)    Parameters below as of 07 Dec 2022 12:03  Patient On (Oxygen Delivery Method): nasal cannula  O2 Flow (L/min): 2    Physical:  General: A&Ox3. NAD.  Abdomen: Soft nondistended, nontender.    I&O's Summary    06 Dec 2022 07:01  -  07 Dec 2022 07:00  --------------------------------------------------------  IN: 240 mL / OUT: 200 mL / NET: 40 mL    07 Dec 2022 07:01  -  07 Dec 2022 12:18  --------------------------------------------------------  IN: 440 mL / OUT: 0 mL / NET: 440 mL          LABS:                        9.1    10.03 )-----------( 191      ( 06 Dec 2022 06:18 )             28.4   12-07    136  |  100  |  12  ----------------------------<  96  3.7   |  27  |  0.72    Ca    8.8      07 Dec 2022 06:52

## 2022-12-07 NOTE — PROVIDER CONTACT NOTE (OTHER) - BACKGROUND
Dx Pancreatitis  Hx Macular degeneration, arthritis, [place], GERD, HTN.
89 year old female, admitted for Acute pancreatitis without infection or necrosis.
Patient admitted with pancreatitis on clear liquid diet and IVF LR 75mL/hr. h(x) of RRT called on 12/2 for pulmonary flash edema/fluid overload.
Patient admitted for acute pancreatitis without infection or necrosis.

## 2022-12-07 NOTE — PROGRESS NOTE ADULT - SUBJECTIVE AND OBJECTIVE BOX
Follow-up Pulm Progress Note    O2 sats 97% on 3LNC this AM, lowered to 2LNC  RN able to wean to 1LNC  Dyspnea improving  +dry cough   No gross hemoptysis overnight but RN reports some streaks of blood mixed with sputum when coughing     Medications:  MEDICATIONS  (STANDING):  chlorhexidine 2% Cloths 1 Application(s) Topical daily  clopidogrel Tablet 75 milliGRAM(s) Oral daily  ethacrynic acid 25 milliGRAM(s) Oral daily  metoprolol tartrate 12.5 milliGRAM(s) Oral two times a day  polyethylene glycol 3350 17 Gram(s) Oral daily      Vital Signs Last 24 Hrs  T(C): 36.9 (07 Dec 2022 12:03), Max: 37.4 (06 Dec 2022 20:31)  T(F): 98.5 (07 Dec 2022 12:03), Max: 99.4 (06 Dec 2022 20:31)  HR: 88 (07 Dec 2022 12:03) (84 - 105)  BP: 115/60 (07 Dec 2022 12:03) (115/60 - 158/73)  BP(mean): --  RR: 18 (07 Dec 2022 12:03) (18 - 20)  SpO2: 95% (07 Dec 2022 12:03) (86% - 96%)    Parameters below as of 07 Dec 2022 12:03  Patient On (Oxygen Delivery Method): nasal cannula  O2 Flow (L/min): 2        12-06 @ 07:01  -  12-07 @ 07:00  --------------------------------------------------------  IN: 240 mL / OUT: 200 mL / NET: 40 mL    LABS:                        9.1    10.03 )-----------( 191      ( 06 Dec 2022 06:18 )             28.4     12-07    136  |  100  |  12  ----------------------------<  96  3.7   |  27  |  0.72    Ca    8.8      07 Dec 2022 06:52    Serum Pro-Brain Natriuretic Peptide: 47914 pg/mL (12-04-22 @ 22:38)    CULTURES:    Culture - Urine (collected 12-01-22 @ 18:42)  Source: Clean Catch Clean Catch (Midstream)  Final Report (12-03-22 @ 09:19):    <10,000 CFU/mL Normal Urogenital Dulce    Physical Examination:  PULM: Decreased BS   CVS: S1, S2 heard    RADIOLOGY REVIEWED  CXR: 12/5 b/l pl effusions    < from: TTE with Doppler (w/Cont) (12.03.22 @ 10:41) >    Dimensions:    Normal Values:  LA:     3.3    2.0 - 4.0 cm  Ao:     2.7    2.0 - 3.8 cm  SEPTUM: 0.9    0.6 - 1.2 cm  PWT:    0.8    0.6 - 1.1 cm  LVIDd:  4.2    3.0 - 5.6cm  LVIDs:  2.3    1.8 - 4.0 cm  Derived variables:  LVMI: 73 g/m2  RWT: 0.38  Fractional short: 45 %  EF (Johnson Rule): 50 %  ------------------------------------------------------------------------  Observations:  Mitral Valve: Mitral annular calcification and calcified  mitral leaflets. Mild-moderate mitral regurgitation.  Aortic Valve/Aorta: Calcified trileaflet aortic valve with  normal opening. Minimal aortic regurgitation.  Peak left  ventricular outflow tract gradient equals 5 mm Hg, mean  gradient is equal to 3 mm Hg, LVOT velocity time integral  equals 23 cm.  Aortic Root: 2.7 cm.  Ascending Aorta: 2.7 cm.  LVOT diameter: 1.7 cm.  Left Atrium: Normal left atrium.  LA volume index = 28  cc/m2.  Left Ventricle: Mild segmental left ventricular systolic  dysfunction.  Akinesis of the apical septum, apex, apical  inferior wall.   Endocardial visualization enhanced with  intravenous injection of Ultrasonic Enhancing Agent  (Definity). No left ventricular thrombus. Normal left  ventricular internal dimensions and wall thickness. Mild  diastolic dysfunction (Stage I).  Right Heart: Normal right atrium. Normal right ventricular  size and function. Normal tricuspid valve. Minimal  tricuspid regurgitation. Normal pulmonic valve.  Pericardium/Pleura: Normal pericardium with no pericardial  effusion.  Hemodynamic: Estimated right atrial pressure is 8 mm Hg.  Estimated right ventricular systolic pressure equals 42 mm  Hg, assuming right atrial pressure equals 8 mm Hg,  consistent with mild pulmonary hypertension.  ------------------------------------------------------------------------  Conclusions:  1. Mild segmental left ventricular systolic dysfunction.  Akinesis of the apical septum, apex, apical inferior wall.   Endocardial visualization enhanced with intravenous  injection of Ultrasonic Enhancing Agent (Definity). No left  ventricular thrombus.  2. Mild diastolic dysfunction (Stage I).  3. Normal right ventricular size and function.  4. Estimated pulmonary artery systolic pressure equals 42  mm Hg, assuming right atrial pressure equals 8 mm Hg,  consistent with mild pulmonary pressures.  *** Compared with echocardiogram of 6/15/2016, wall motion  abnormality is noted.    < end of copied text >

## 2022-12-07 NOTE — PROVIDER CONTACT NOTE (OTHER) - SITUATION
Tele Event, V tach for 8 Beats
PAT x5secs -170s on telemetry
Pt. Oxygen saturation on room air at rest: 86%
Patient complains of increased abdominal discomfort/fullness, orthopnea, and dry cough

## 2022-12-07 NOTE — PROGRESS NOTE ADULT - SUBJECTIVE AND OBJECTIVE BOX
Medications:  chlorhexidine 2% Cloths 1 Application(s) Topical daily  clopidogrel Tablet 75 milliGRAM(s) Oral daily  ethacrynic acid 25 milliGRAM(s) Oral daily  metoprolol tartrate 12.5 milliGRAM(s) Oral two times a day  polyethylene glycol 3350 17 Gram(s) Oral daily    PMH/PSH/FH/SH:  Unchanged    ROS:  Negative    Vitals:  T(C): 36.9 (12-07-22 @ 12:03), Max: 37.4 (12-06-22 @ 20:31)  HR: 88 (12-07-22 @ 12:03) (84 - 105)  BP: 115/60 (12-07-22 @ 12:03) (115/60 - 158/73)  RR: 18 (12-07-22 @ 12:03) (18 - 20)  SpO2: 95% (12-07-22 @ 12:03) (86% - 96%)    EXAM:  GENERAL:  Alert, no distress  HEENT: Normocephalic, EOM intact, PERRLA  NECK: Normal carotid upstrokes, no bruit; No JVD  CHEST:  Clear to auscultation  HEART: PMI not displaced. Normal S1 and S2.  No murmur, rub or gallop.  ABDOMEN: Normal bowel sounds, no bruit. Soft, non-tender.  Liver and spleen not palpable; aorta not palpable.  EXT:  No edema, no varicosities, 2+ pulses in upper and lower extremities.  PSYCH:  A&Ox3, normal insight, no anxiety  NEURO: Non-focal  SKIN:  No rash       TELE:      I&O's Summary  06 Dec 2022 07:01  -  07 Dec 2022 07:00  --------------------------------------------------------  IN: 240 mL / OUT: 200 mL / NET: 40 mL    07 Dec 2022 07:01  -  07 Dec 2022 13:30  --------------------------------------------------------  IN: 440 mL / OUT: 0 mL / NET: 440 mL      LABS:                      9.1    10.03 )-----------( 191      ( 06 Dec 2022 06:18 )             28.4     12-07  136  |  100  |  12  ----------------------------<  96  3.7   |  27  |  0.72    Ca    8.8      07 Dec 2022 06:52    Serum Pro-Brain Natriuretic Peptide: 82593 pg/mL (12-04-22 @ 22:38)  Serum Pro-Brain Natriuretic Peptide: 3636 pg/mL (12-02-22 @ 19:22)    ECHO 123/22:  Conclusions:  1. Mild segmental left ventricular systolic dysfunction. Akinesis of the apical septum, apex, apical inferior wall.  Endocardial visualization enhanced with intravenous injection of Ultrasonic Enhancing Agent (Definity). No left ventricular thrombus.  2. Mild diastolic dysfunction (Stage I).  3. Normal right ventricular size and function.  4. Estimated pulmonary artery systolic pressure equals 42 mmHg, assuming right atrial pressure equals 8 mm Hg, consistent with mild pulmonary pressures.  *** Compared with echocardiogram of 6/15/2016, wall motion abnormality is noted.        CT 12/21/22:  IMPRESSION:  Acute interstitial pancreatitis. No pancreatic or peripancreatic  collections.  Ground glass opacities, interlobular septal thickening and bronchial wall  thickening at the lung bases, which could be related to mild pulmonary  edema or infection.  Alert, OOB to chair; no distress.  Reports no cardiac sxs; no CP, palps or dyspnea.  Mild episode of diarrhea with mild abd. cramping, but overall seems better.  Taking small amounts of apple sauce.       Medications:  chlorhexidine 2% Cloths 1 Application(s) Topical daily  clopidogrel Tablet 75 milliGRAM(s) Oral daily  ethacrynic acid 25 milliGRAM(s) Oral daily  metoprolol tartrate 12.5 milliGRAM(s) Oral two times a day  polyethylene glycol 3350 17 Gram(s) Oral daily    PMH/PSH/FH/SH:  Unchanged    ROS:  Negative    Vitals:  T(C): 36.9 (12-07-22 @ 12:03), Max: 37.4 (12-06-22 @ 20:31)  HR: 88 (12-07-22 @ 12:03) (84 - 105)  BP: 115/60 (12-07-22 @ 12:03) (115/60 - 158/73)  RR: 18 (12-07-22 @ 12:03) (18 - 20)  SpO2: 95% (12-07-22 @ 12:03) (86% - 96%)    EXAM:  GENERAL:  Alert, no distress  HEENT: Normocephalic, EOM intact, PERRLA  NECK: Normal carotid upstrokes, no bruit; No JVD  CHEST:  Clear to auscultation  HEART: PMI not displaced. Normal S1 and S2.  No murmur, rub or gallop.  ABDOMEN: Normal bowel sounds, no bruit. Soft, non-tender.  Liver and spleen not palpable; aorta not palpable.  EXT:  No edema, no varicosities, 2+ pulses in upper and lower extremities.  PSYCH:  A&Ox3, normal insight, no anxiety  NEURO: Non-focal  SKIN:  No rash       TELE:  SR, 80 - 110 bpm      I&O's Summary  06 Dec 2022 07:01  -  07 Dec 2022 07:00  --------------------------------------------------------  IN: 240 mL / OUT: 200 mL / NET: 40 mL    07 Dec 2022 07:01  -  07 Dec 2022 13:30  --------------------------------------------------------  IN: 440 mL / OUT: 0 mL / NET: 440 mL      LABS:                      9.1    10.03 )-----------( 191      ( 06 Dec 2022 06:18 )             28.4     12-07  136  |  100  |  12  ----------------------------<  96  3.7   |  27  |  0.72    Ca    8.8      07 Dec 2022 06:52    Serum Pro-Brain Natriuretic Peptide: 43983 pg/mL (12-04-22 @ 22:38)  Serum Pro-Brain Natriuretic Peptide: 3636 pg/mL (12-02-22 @ 19:22)    ECHO 123/22:  Conclusions:  1. Mild segmental left ventricular systolic dysfunction. Akinesis of the apical septum, apex, apical inferior wall.  Endocardial visualization enhanced with intravenous injection of Ultrasonic Enhancing Agent (Definity). No left ventricular thrombus.  2. Mild diastolic dysfunction (Stage I).  3. Normal right ventricular size and function.  4. Estimated pulmonary artery systolic pressure equals 42 mmHg, assuming right atrial pressure equals 8 mm Hg, consistent with mild pulmonary pressures.  *** Compared with echocardiogram of 6/15/2016, wall motion abnormality is noted.        CXR 12/5:  Partial clearing of the lungs.

## 2022-12-07 NOTE — PROGRESS NOTE ADULT - ASSESSMENT
Tania TysonRefugiolidia is a 89 yo F with a hx of HTN, PAD with intermittent claudication, RBBB and low-grade atrial ectopy.  Hx. 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  Presented to ED after 1 week of abdominal distention associated with mild nausea and abdominal distention.  Saw Dr. Villalobos, and blood work checked, revealing lipase > 6000; referred to ED.  CT scan c/w pancreatitis.  Developed dyspnea/presents with hypoxemic respiratory failure attributed to flash pulmonary edema after IVF administration for pancreatitis.  Echo on 12/3 showed mild to moderate MR, mild LV dysfunction with EF 50% with akinesis of apical-septum, apex and apical inferior wall.  Mild diastolic LV dysfunction and mild elevation of PA pressure also noted.  Dyspnea appears to have resolved at this time.      REC:  1.  Dyspnea, due to pulmonary congestion/CHF, seems improved.  Echo with mild systolic and diastolic dysfunction; mild to moderate MR noted.  - continue strict I and O  - CXR to be repeated today  - continue ethacrynic acid by po route at 25 mg. daily for now.  - Labs with hstrop 99 > 324.  Picture not consistent with ACS/type I NSTEMI; suspect type II MI related to demand ischemia.  Continue usual Plavix.  Continue telemetry    2.  Elevated D-dimer  - V/Q scan planned.    3.  Has known PAD, and segmental dysfunction seen on echo c/w LAD.   - would start statin if no contraindication.   - continue usual Plavix    4.  WCT, ?NSVT  - continue telemetry at this time.  - consider adding beta blocker    5.  Pancreatitis  - conservative management per GI consultant Tania TysonSriniYomaira is a 89 yo F with a hx of HTN, PAD with intermittent claudication, RBBB and low-grade atrial ectopy.  Hx. 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  Presented to ED after 1 week of abdominal distention associated with mild nausea and abdominal distention.  Saw Dr. Villalobos, and blood work checked, revealing lipase > 6000; referred to ED.  CT scan c/w pancreatitis.  Developed dyspnea/presents with hypoxemic respiratory failure attributed to flash pulmonary edema after IVF administration for pancreatitis.  Echo on 12/3 showed mild to moderate MR, mild LV dysfunction with EF 50% with akinesis of apical-septum, apex and apical inferior wall.  Mild diastolic LV dysfunction and mild elevation of PA pressure also noted.  Dyspnea improved at this time.      REC:  1.  Dyspnea, due to pulmonary congestion/CHF, seems improved.  Echo with mild systolic and diastolic dysfunction; mild to moderate MR noted.  - continue strict I and O  - continue ethacrynic acid 25 mg. daily .  - repeat BNP in AM    2.  WCT, ?NSVT  - no significant rhythm disturbances on telemetry.  - continue metoprolol    3.  Pancreatitis  - management per GI consultant      Kale Khan M.D.  Office: [phone]

## 2022-12-07 NOTE — PROGRESS NOTE ADULT - PROBLEM SELECTOR PLAN 3
Reports of hemoptysis 12/4  -Pt refusing VQ scan  -Clinically improving  -No episodes of gross hemoptysis, streaks of blood mixed with sputum per RN  -Check RVP as patient with dry cough  -Monitor respiratory status  -Monitor for increased hemoptysis.

## 2022-12-07 NOTE — PROVIDER CONTACT NOTE (OTHER) - ASSESSMENT
Patient A&Ox4, RR 24, SpO2 95% on 3L NC, /61, HR 92. B/L anterior inspiratory wheeze noted, patient with difficulty taking deep breaths, with dry cough aggravated by deep inspiration. Abdomen mildly distended, denies pain with palpation, complains of difficulty breathing when laying flat, relieved by sitting up. Had 2 bowel movements today, semi-formed.
Patient AxOx4 but forgetful at times. VSS. No complaints of chest pain or shortness or breath. No associated symptoms noted. At the time of the event. RN was starting an IV infusion of ethacrynic IVPB, Patient complained of Pain to IV site, Infusion stopped.
pt. AOx4. Oxygen saturation on room air at rest: 86%. c/o SOB.
Pt aox3-4, responsive. No s/s of distress observed/reported. VSS, asymptomatic.
Unknown if ever smoked

## 2022-12-07 NOTE — PROGRESS NOTE ADULT - ASSESSMENT
89y.o. Female abdominal pain   elevated lipase   pancreatitis, clinically improving    lipase down trending   CT non con with acute pancreatitis, no evidence of gallstones   triglycerides are normal   now with flash pulm edema   fluids held  diet as tolerated  cards/pulm following   trend lipase  pain meds prn  will follow    Advanced care planning was discussed with patient and family.  Advanced care planning forms were reviewed and discussed.  Risks, benefits and alternatives of gastroenterologic procedures were discussed in detail and all questions were answered.    30 minutes spent.

## 2022-12-08 LAB
ALBUMIN SERPL ELPH-MCNC: 2.7 G/DL — LOW (ref 3.3–5)
ALP SERPL-CCNC: 50 U/L — SIGNIFICANT CHANGE UP (ref 40–120)
ALT FLD-CCNC: 11 U/L — SIGNIFICANT CHANGE UP (ref 10–45)
ANION GAP SERPL CALC-SCNC: 11 MMOL/L — SIGNIFICANT CHANGE UP (ref 5–17)
AST SERPL-CCNC: 14 U/L — SIGNIFICANT CHANGE UP (ref 10–40)
BILIRUB SERPL-MCNC: 0.8 MG/DL — SIGNIFICANT CHANGE UP (ref 0.2–1.2)
BUN SERPL-MCNC: 11 MG/DL — SIGNIFICANT CHANGE UP (ref 7–23)
CALCIUM SERPL-MCNC: 9 MG/DL — SIGNIFICANT CHANGE UP (ref 8.4–10.5)
CHLORIDE SERPL-SCNC: 98 MMOL/L — SIGNIFICANT CHANGE UP (ref 96–108)
CO2 SERPL-SCNC: 24 MMOL/L — SIGNIFICANT CHANGE UP (ref 22–31)
CREAT SERPL-MCNC: 0.69 MG/DL — SIGNIFICANT CHANGE UP (ref 0.5–1.3)
EGFR: 77 ML/MIN/1.73M2 — SIGNIFICANT CHANGE UP
GLUCOSE SERPL-MCNC: 94 MG/DL — SIGNIFICANT CHANGE UP (ref 70–99)
HCT VFR BLD CALC: 26.9 % — LOW (ref 34.5–45)
HGB BLD-MCNC: 8.8 G/DL — LOW (ref 11.5–15.5)
LIDOCAIN IGE QN: 1877 U/L — HIGH (ref 7–60)
MCHC RBC-ENTMCNC: 28.8 PG — SIGNIFICANT CHANGE UP (ref 27–34)
MCHC RBC-ENTMCNC: 32.7 GM/DL — SIGNIFICANT CHANGE UP (ref 32–36)
MCV RBC AUTO: 87.9 FL — SIGNIFICANT CHANGE UP (ref 80–100)
NRBC # BLD: 0 /100 WBCS — SIGNIFICANT CHANGE UP (ref 0–0)
PLATELET # BLD AUTO: 225 K/UL — SIGNIFICANT CHANGE UP (ref 150–400)
POTASSIUM SERPL-MCNC: 3.5 MMOL/L — SIGNIFICANT CHANGE UP (ref 3.5–5.3)
POTASSIUM SERPL-SCNC: 3.5 MMOL/L — SIGNIFICANT CHANGE UP (ref 3.5–5.3)
PROT SERPL-MCNC: 6.2 G/DL — SIGNIFICANT CHANGE UP (ref 6–8.3)
RBC # BLD: 3.06 M/UL — LOW (ref 3.8–5.2)
RBC # FLD: 13.7 % — SIGNIFICANT CHANGE UP (ref 10.3–14.5)
SODIUM SERPL-SCNC: 133 MMOL/L — LOW (ref 135–145)
WBC # BLD: 10.46 K/UL — SIGNIFICANT CHANGE UP (ref 3.8–10.5)
WBC # FLD AUTO: 10.46 K/UL — SIGNIFICANT CHANGE UP (ref 3.8–10.5)

## 2022-12-08 PROCEDURE — 99232 SBSQ HOSP IP/OBS MODERATE 35: CPT | Mod: GC

## 2022-12-08 PROCEDURE — 71045 X-RAY EXAM CHEST 1 VIEW: CPT | Mod: 26

## 2022-12-08 RX ORDER — ACETAMINOPHEN 500 MG
650 TABLET ORAL ONCE
Refills: 0 | Status: COMPLETED | OUTPATIENT
Start: 2022-12-08 | End: 2022-12-08

## 2022-12-08 RX ADMIN — CHLORHEXIDINE GLUCONATE 1 APPLICATION(S): 213 SOLUTION TOPICAL at 11:13

## 2022-12-08 RX ADMIN — POLYETHYLENE GLYCOL 3350 17 GRAM(S): 17 POWDER, FOR SOLUTION ORAL at 11:13

## 2022-12-08 RX ADMIN — Medication 12.5 MILLIGRAM(S): at 05:55

## 2022-12-08 RX ADMIN — Medication 100 MILLIGRAM(S): at 14:27

## 2022-12-08 RX ADMIN — Medication 650 MILLIGRAM(S): at 15:00

## 2022-12-08 RX ADMIN — ETHACRYNIC ACID 25 MILLIGRAM(S): 25 TABLET ORAL at 05:55

## 2022-12-08 RX ADMIN — Medication 650 MILLIGRAM(S): at 14:14

## 2022-12-08 RX ADMIN — CLOPIDOGREL BISULFATE 75 MILLIGRAM(S): 75 TABLET, FILM COATED ORAL at 11:11

## 2022-12-08 RX ADMIN — Medication 12.5 MILLIGRAM(S): at 17:14

## 2022-12-08 RX ADMIN — Medication 100 MILLIGRAM(S): at 22:18

## 2022-12-08 NOTE — PROGRESS NOTE ADULT - PROBLEM SELECTOR PLAN 2
Pt with episode of hypoxia to mid 80s with ambulation 12/2, resolved with rest and 2LNC  -S/p RRT later in the evening for hypoxia, suspect 2nd to pulm edema. Repeat CXR with increased congestion, proBNP elevated, had been receiving IV fluids  -Elevated ddimer noted. Unable to do CTA 2nd to contrast allergy, pt refusing VQ scan. Trend ddimer.   -LE duplex negative for DVT  -RVP negative  -Dyspnea, O2 requirements appear to be improving with diuresis  -Keep O>I as tolerated   -Keep sats >90% with O2 as needed (currently 1LNC). Wean O2 as tolerated  -ABG with no CO2 retention, has refused Bipap past 2 nights. Can d/c Bipap and monitor off for now.

## 2022-12-08 NOTE — PROGRESS NOTE ADULT - SUBJECTIVE AND OBJECTIVE BOX
[facility]  Keegan Cox PA-C  [address] [place] [id number] [phone]    INTERVAL HPI/OVERNIGHT EVENTS:  Pt seen and examined, events noted  feeling nauseous this am  lipase uptrending   will revert to CLD    MEDICATIONS  (STANDING):  chlorhexidine 2% Cloths 1 Application(s) Topical daily  clopidogrel Tablet 75 milliGRAM(s) Oral daily  lactated ringers. 1000 milliLiter(s) (75 mL/Hr) IV Continuous <Continuous>  ondansetron Injectable 4 milliGRAM(s) IV Push once  polyethylene glycol 3350 17 Gram(s) Oral daily    Vital Signs Last 24 Hrs  T(C): 36.5 (08 Dec 2022 11:18), Max: 37.2 (08 Dec 2022 04:21)  T(F): 97.7 (08 Dec 2022 11:18), Max: 99 (08 Dec 2022 04:21)  HR: 82 (08 Dec 2022 11:18) (76 - 91)  BP: 148/72 (08 Dec 2022 11:18) (115/60 - 148/72)  BP(mean): --  RR: 18 (08 Dec 2022 11:18) (18 - 18)  SpO2: 95% (08 Dec 2022 11:18) (93% - 96%)    Parameters below as of 08 Dec 2022 11:18  Patient On (Oxygen Delivery Method): nasal cannula  O2 Flow (L/min): 1      Physical:  General: A&Ox3. NAD.  Abdomen: Soft nondistended, nontender.    I&O's Summary    07 Dec 2022 07:01  -  08 Dec 2022 07:00  --------------------------------------------------------  IN: 440 mL / OUT: 400 mL / NET: 40 mL      LABS:                        8.8    10.46 )-----------( 225      ( 08 Dec 2022 09:26 )             26.9   12-08    133<L>  |  98  |  11  ----------------------------<  94  3.5   |  24  |  0.69    Ca    9.0      08 Dec 2022 09:26    TPro  6.2  /  Alb  2.7<L>  /  TBili  0.8  /  DBili  x   /  AST  14  /  ALT  11  /  AlkPhos  50  12-08

## 2022-12-08 NOTE — PROGRESS NOTE ADULT - PROBLEM SELECTOR PLAN 3
Reports of hemoptysis 12/4  -Pt refusing VQ scan  -Clinically improving  -No episodes of gross hemoptysis, intermittent streaks of blood mixed with sputum per RN  -Monitor respiratory status  -Monitor for increased hemoptysis.

## 2022-12-08 NOTE — PROGRESS NOTE ADULT - SUBJECTIVE AND OBJECTIVE BOX
Follow-up Pulm Progress Note    Nauseous this AM, poor appetite  +dry cough - RVP negative  Denies SOB,CP - refused bipap overnight  O2 sats 99% on 2LNC, lowered to 1LNC now, Per RN desats on RA    Medications:  MEDICATIONS  (STANDING):  chlorhexidine 2% Cloths 1 Application(s) Topical daily  clopidogrel Tablet 75 milliGRAM(s) Oral daily  ethacrynic acid 25 milliGRAM(s) Oral daily  metoprolol tartrate 12.5 milliGRAM(s) Oral two times a day  polyethylene glycol 3350 17 Gram(s) Oral daily    Vital Signs Last 24 Hrs  T(C): 37.2 (08 Dec 2022 04:21), Max: 37.2 (08 Dec 2022 04:21)  T(F): 99 (08 Dec 2022 04:21), Max: 99 (08 Dec 2022 04:21)  HR: 76 (08 Dec 2022 08:39) (76 - 91)  BP: 142/77 (08 Dec 2022 04:21) (115/60 - 143/76)  BP(mean): --  RR: 18 (08 Dec 2022 04:21) (18 - 18)  SpO2: 94% (08 Dec 2022 08:39) (86% - 96%)    Parameters below as of 08 Dec 2022 04:21  Patient On (Oxygen Delivery Method): nasal cannula  O2 Flow (L/min): 2    12-07 @ 07:01  -  12-08 @ 07:00  --------------------------------------------------------  IN: 440 mL / OUT: 400 mL / NET: 40 mL    LABS:                        8.8    10.46 )-----------( 225      ( 08 Dec 2022 09:26 )             26.9     12-08    133<L>  |  98  |  11  ----------------------------<  94  3.5   |  24  |  0.69    Ca    9.0      08 Dec 2022 09:26    TPro  6.2  /  Alb  2.7<L>  /  TBili  0.8  /  DBili  x   /  AST  14  /  ALT  11  /  AlkPhos  50  12-08    CULTURES:       Culture - Urine (collected 12-01-22 @ 18:42)  Source: Clean Catch Clean Catch (Midstream)  Final Report (12-03-22 @ 09:19):    <10,000 CFU/mL Normal Urogenital Dulce    Physical Examination:  PULM: scattered crackles   CVS: S1, S2 heard    RADIOLOGY REVIEWED  CXR: pulm congestion, b/l pl effusions

## 2022-12-08 NOTE — PROGRESS NOTE ADULT - SUBJECTIVE AND OBJECTIVE BOX
Alert, no cardiac sxs; no CP, palps or dyspnea.  Feels more weary today.  Ate breakfast, but mild GI distress; not interested in lunch at present.       Medications:  benzonatate 100 milliGRAM(s) Oral every 8 hours  chlorhexidine 2% Cloths 1 Application(s) Topical daily  clopidogrel Tablet 75 milliGRAM(s) Oral daily  ethacrynic acid 25 milliGRAM(s) Oral daily  metoprolol tartrate 12.5 milliGRAM(s) Oral two times a day  polyethylene glycol 3350 17 Gram(s) Oral daily    PMH/PSH/FH/SH:  Unchanged    ROS:  Unchanged    Vitals:  T(C): 36.5 (12-08-22 @ 11:18), Max: 37.2 (12-08-22 @ 04:21)  HR: 82 (12-08-22 @ 11:18) (76 - 91)  BP: 148/72 (12-08-22 @ 11:18) (137/74 - 148/72)  RR: 18 (12-08-22 @ 11:18) (18 - 18)  SpO2: 95% (12-08-22 @ 11:18) (93% - 96%)       EXAM:  GENERAL:  Alert, no distress  HEENT: Normocephalic, EOM intact, PERRLA  NECK: Normal carotid upstrokes, no bruit; No JVD  CHEST:  Clear to auscultation  HEART: PMI not displaced. Normal S1 and S2.  No murmur, rub or gallop.  ABDOMEN: Normal bowel sounds, no bruit. Soft, non-tender.  Liver and spleen not palpable; aorta not palpable.  EXT:  No edema, no varicosities, 2+ pulses in upper and lower extremities.  PSYCH:  A&Ox3, normal insight, no anxiety  NEURO: Non-focal  SKIN:  No rash       TELE:  NSR. Transient PSVT yesterday (< 3 sec.); occ. isolated VPCs.      I&O's Summary  07 Dec 2022 07:01  -  08 Dec 2022 07:00  --------------------------------------------------------  IN: 440 mL / OUT: 400 mL / NET: 40 mL    08 Dec 2022 07:01  -  08 Dec 2022 12:06  --------------------------------------------------------  IN: 480 mL / OUT: 0 mL / NET: 480 mL      LABS:                      8.8    10.46 )-----------( 225      ( 08 Dec 2022 09:26 )             26.9     12-08  133<L>  |  98  |  11  ----------------------------<  94  3.5   |  24  |  0.69    Ca    9.0      08 Dec 2022 09:26    TPro  6.2  /  Alb  2.7<L>  /  TBili  0.8  /  DBili  x   /  AST  14  /  ALT  11  /  AlkPhos  50  12-08    Serum Pro-Brain Natriuretic Peptide: 29786 pg/mL (12-04-22 @ 22:38)  Serum Pro-Brain Natriuretic Peptide: 3636 pg/mL (12-02-22 @ 19:22)

## 2022-12-08 NOTE — PROGRESS NOTE ADULT - PROBLEM SELECTOR PLAN 3
Patient found to have an O2 sat in the 80s on room air  increased to 93 on O2  found to have ground glass opacities in the RUL on CT  continue diuretics  Patient is currently on ethacrynic acid May need to increase dose to BID  Patient seen by pulmonary  refused VQ. continue to monitor O2 sats

## 2022-12-08 NOTE — PROGRESS NOTE ADULT - SUBJECTIVE AND OBJECTIVE BOX
89yof PMHx of macular degeneration (appears to be legally blind), severe presbycusis, and hypertension presents after 1 week of abdominal distention mild nausea enlarging abdomen went to PMD Dr. Villalobos having routine blood work done for this issue and sent in today for evaluation after lipase greater than 6000.  Denies previous abdominal surgeries denies constipation diarrhea Patient has been resting comfortably.  Patient had an episode of shortness of breath 12/2. Started on ethacrynic acid with improvement in symptoms. Patient moved to [facility] for further monitoring. Patient states abdominal pain has improved. Patient O2 sats have been stable.       MEDICATIONS  (STANDING):  benzonatate 100 milliGRAM(s) Oral every 8 hours  chlorhexidine 2% Cloths 1 Application(s) Topical daily  clopidogrel Tablet 75 milliGRAM(s) Oral daily  ethacrynic acid 25 milliGRAM(s) Oral daily  metoprolol tartrate 12.5 milliGRAM(s) Oral two times a day  polyethylene glycol 3350 17 Gram(s) Oral daily    MEDICATIONS  (PRN):          VITALS:   T(C): 36.5 (12-08-22 @ 11:18), Max: 37.2 (12-08-22 @ 04:21)  HR: 82 (12-08-22 @ 11:18) (76 - 91)  BP: 148/72 (12-08-22 @ 11:18) (137/74 - 148/72)  RR: 18 (12-08-22 @ 11:18) (18 - 18)  SpO2: 95% (12-08-22 @ 11:18) (93% - 96%)  Wt(kg): --    PHYSICAL EXAM:  GENERAL: NAD, well nourished and conversant  HEAD:  Atraumatic  EYES: EOM, PERRLA, conjunctiva pink and sclera white  ENT: No tonsillar erythema, exudates, or enlargement, moist mucous membranes, good dentition, no lesions  NECK: Supple, No JVD, normal thyroid, carotids with normal upstrokes and no bruits  CHEST/LUNG: Clear to auscultation bilaterally, No rales, rhonchi, wheezing, or rubs  HEART: Regular rate and rhythm, No murmurs, rubs, or gallops  ABDOMEN: Soft, nondistended, no masses, guarding, tenderness or rebound, bowel sounds present  EXTREMITIES:  2+ Peripheral Pulses, No clubbing, cyanosis, or edema.   LYMPH: No lymphadenopathy noted  SKIN: No rashes or lesions  NERVOUS SYSTEM:  Alert & Oriented X3, normal cognitive function. Motor Strength 5/5 right upper and right lower.  5/5 left upper and left lower extremities, DTRs 2+ intact and symmetric      LABS:        CBC Full  -  ( 08 Dec 2022 09:26 )  WBC Count : 10.46 K/uL  RBC Count : 3.06 M/uL  Hemoglobin : 8.8 g/dL  Hematocrit : 26.9 %  Platelet Count - Automated : 225 K/uL  Mean Cell Volume : 87.9 fl  Mean Cell Hemoglobin : 28.8 pg  Mean Cell Hemoglobin Concentration : 32.7 gm/dL  Auto Neutrophil # : x  Auto Lymphocyte # : x  Auto Monocyte # : x  Auto Eosinophil # : x  Auto Basophil # : x  Auto Neutrophil % : x  Auto Lymphocyte % : x  Auto Monocyte % : x  Auto Eosinophil % : x  Auto Basophil % : x    12-08    133<L>  |  98  |  11  ----------------------------<  94  3.5   |  24  |  0.69    Ca    9.0      08 Dec 2022 09:26    TPro  6.2  /  Alb  2.7<L>  /  TBili  0.8  /  DBili  x   /  AST  14  /  ALT  11  /  AlkPhos  50  12-08    LIVER FUNCTIONS - ( 08 Dec 2022 09:26 )  Alb: 2.7 g/dL / Pro: 6.2 g/dL / ALK PHOS: 50 U/L / ALT: 11 U/L / AST: 14 U/L / GGT: x               CAPILLARY BLOOD GLUCOSE          RADIOLOGY & ADDITIONAL TESTS:

## 2022-12-08 NOTE — PROGRESS NOTE ADULT - ASSESSMENT
Tania Person is a 89 yo F with a hx of HTN, PAD with intermittent claudication, RBBB and low-grade atrial ectopy.  Hx. 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  Presented to ED after 1 week of abdominal distention associated with mild nausea and abdominal distention.  Saw Dr. Villalobos, and blood work checked, revealing lipase > 6000; referred to ED.  CT scan c/w pancreatitis.  Developed dyspnea/presents with hypoxemic respiratory failure attributed to flash pulmonary edema after IVF administration for pancreatitis.  Echo on 12/3 showed mild to moderate MR, mild LV dysfunction with EF 50% with akinesis of apical-septum, apex and apical inferior wall.  Mild diastolic LV dysfunction and mild elevation of PA pressure also noted.  Dyspnea improved at this time.      REC:  1.  Dyspnea, due to pulmonary congestion/CHF, seems improved.  Echo with mild systolic and diastolic dysfunction; mild to moderate MR noted.  - continue strict I and O  - continue ethacrynic acid 25 mg. daily .  - repeat BNP in AM    2. No significant rhythm disturbances on telemetry.  - continue metoprolol    3.  Pancreatitis  - management per GI consultant    Kale Khan M.D.  Office: [phone]

## 2022-12-08 NOTE — PROGRESS NOTE ADULT - ASSESSMENT
89y.o. Female abdominal pain   elevated lipase   pancreatitis, clinically improving    lipase down trending   CT non con with acute pancreatitis, no evidence of gallstones   triglycerides are normal   now with flash pulm edema   fluids held  cards/pulm following   revert to CLD  trend lipase, now up trending again   if lipase keeps trending up may need to recheck CT  antiemetics prn   pain meds prn  will follow    Advanced care planning was discussed with patient and family.  Advanced care planning forms were reviewed and discussed.  Risks, benefits and alternatives of gastroenterologic procedures were discussed in detail and all questions were answered.    30 minutes spent.

## 2022-12-09 LAB
ANION GAP SERPL CALC-SCNC: 10 MMOL/L — SIGNIFICANT CHANGE UP (ref 5–17)
BUN SERPL-MCNC: 9 MG/DL — SIGNIFICANT CHANGE UP (ref 7–23)
CALCIUM SERPL-MCNC: 9.3 MG/DL — SIGNIFICANT CHANGE UP (ref 8.4–10.5)
CHLORIDE SERPL-SCNC: 96 MMOL/L — SIGNIFICANT CHANGE UP (ref 96–108)
CO2 SERPL-SCNC: 29 MMOL/L — SIGNIFICANT CHANGE UP (ref 22–31)
CREAT SERPL-MCNC: 0.7 MG/DL — SIGNIFICANT CHANGE UP (ref 0.5–1.3)
D DIMER BLD IA.RAPID-MCNC: 1711 NG/ML DDU — HIGH
EGFR: 77 ML/MIN/1.73M2 — SIGNIFICANT CHANGE UP
GLUCOSE SERPL-MCNC: 110 MG/DL — HIGH (ref 70–99)
HCT VFR BLD CALC: 29.7 % — LOW (ref 34.5–45)
HGB BLD-MCNC: 9.7 G/DL — LOW (ref 11.5–15.5)
MCHC RBC-ENTMCNC: 28.8 PG — SIGNIFICANT CHANGE UP (ref 27–34)
MCHC RBC-ENTMCNC: 32.7 GM/DL — SIGNIFICANT CHANGE UP (ref 32–36)
MCV RBC AUTO: 88.1 FL — SIGNIFICANT CHANGE UP (ref 80–100)
NRBC # BLD: 0 /100 WBCS — SIGNIFICANT CHANGE UP (ref 0–0)
NT-PROBNP SERPL-SCNC: 8058 PG/ML — HIGH (ref 0–300)
PLATELET # BLD AUTO: 242 K/UL — SIGNIFICANT CHANGE UP (ref 150–400)
POTASSIUM SERPL-MCNC: 3.7 MMOL/L — SIGNIFICANT CHANGE UP (ref 3.5–5.3)
POTASSIUM SERPL-SCNC: 3.7 MMOL/L — SIGNIFICANT CHANGE UP (ref 3.5–5.3)
PROCALCITONIN SERPL-MCNC: 0.09 NG/ML — SIGNIFICANT CHANGE UP (ref 0.02–0.1)
RBC # BLD: 3.37 M/UL — LOW (ref 3.8–5.2)
RBC # FLD: 13.7 % — SIGNIFICANT CHANGE UP (ref 10.3–14.5)
SARS-COV-2 RNA SPEC QL NAA+PROBE: SIGNIFICANT CHANGE UP
SODIUM SERPL-SCNC: 135 MMOL/L — SIGNIFICANT CHANGE UP (ref 135–145)
WBC # BLD: 9.88 K/UL — SIGNIFICANT CHANGE UP (ref 3.8–10.5)
WBC # FLD AUTO: 9.88 K/UL — SIGNIFICANT CHANGE UP (ref 3.8–10.5)

## 2022-12-09 PROCEDURE — 99232 SBSQ HOSP IP/OBS MODERATE 35: CPT | Mod: GC

## 2022-12-09 RX ORDER — ACETAMINOPHEN 500 MG
650 TABLET ORAL EVERY 6 HOURS
Refills: 0 | Status: DISCONTINUED | OUTPATIENT
Start: 2022-12-09 | End: 2022-12-10

## 2022-12-09 RX ORDER — ETHACRYNIC ACID 25 MG/1
50 TABLET ORAL DAILY
Refills: 0 | Status: DISCONTINUED | OUTPATIENT
Start: 2022-12-10 | End: 2022-12-10

## 2022-12-09 RX ADMIN — Medication 100 MILLIGRAM(S): at 13:19

## 2022-12-09 RX ADMIN — CHLORHEXIDINE GLUCONATE 1 APPLICATION(S): 213 SOLUTION TOPICAL at 11:16

## 2022-12-09 RX ADMIN — Medication 12.5 MILLIGRAM(S): at 17:02

## 2022-12-09 RX ADMIN — Medication 100 MILLIGRAM(S): at 05:14

## 2022-12-09 RX ADMIN — Medication 100 MILLIGRAM(S): at 23:53

## 2022-12-09 RX ADMIN — ETHACRYNIC ACID 25 MILLIGRAM(S): 25 TABLET ORAL at 05:14

## 2022-12-09 RX ADMIN — CLOPIDOGREL BISULFATE 75 MILLIGRAM(S): 75 TABLET, FILM COATED ORAL at 11:14

## 2022-12-09 RX ADMIN — Medication 12.5 MILLIGRAM(S): at 05:14

## 2022-12-09 NOTE — PROGRESS NOTE ADULT - ASSESSMENT
89y.o. Female abdominal pain   elevated lipase   pancreatitis, clinically improving      CT non con with acute pancreatitis, no evidence of gallstones   triglycerides are normal   now with flash pulm edema   cards/pulm following   no abdominal pain   no need to trend lipase, check as outpatient in 1-2 weeks   regular diet as tolerated  will follow  dw pt and aide at bedside     Advanced care planning was discussed with patient and family.  Advanced care planning forms were reviewed and discussed.  Risks, benefits and alternatives of gastroenterologic procedures were discussed in detail and all questions were answered.    30 minutes spent.

## 2022-12-09 NOTE — PROGRESS NOTE ADULT - SUBJECTIVE AND OBJECTIVE BOX
[facility]  Keegan Cox PA-C  [address] [place], [place] [id number] [phone]    INTERVAL HPI/OVERNIGHT EVENTS:  Pt seen and examined, no new events, aide at bedside  no abdominal pain   not much of an appetite  will put back to regular diet     MEDICATIONS  (STANDING):  chlorhexidine 2% Cloths 1 Application(s) Topical daily  clopidogrel Tablet 75 milliGRAM(s) Oral daily  lactated ringers. 1000 milliLiter(s) (75 mL/Hr) IV Continuous <Continuous>  ondansetron Injectable 4 milliGRAM(s) IV Push once  polyethylene glycol 3350 17 Gram(s) Oral daily    Vital Signs Last 24 Hrs  T(C): 36.4 (09 Dec 2022 11:05), Max: 36.8 (09 Dec 2022 00:07)  T(F): 97.6 (09 Dec 2022 11:05), Max: 98.3 (09 Dec 2022 00:07)  HR: 64 (09 Dec 2022 11:05) (64 - 86)  BP: 129/69 (09 Dec 2022 11:05) (129/69 - 160/71)  BP(mean): --  RR: 18 (09 Dec 2022 11:05) (18 - 18)  SpO2: 92% (09 Dec 2022 11:05) (91% - 95%)    Parameters below as of 09 Dec 2022 11:05  Patient On (Oxygen Delivery Method): nasal cannula  O2 Flow (L/min): 1    Physical:  General: A&Ox3. NAD.  Abdomen: Soft nondistended, nontender.    I&O's Summary    08 Dec 2022 07:01  -  09 Dec 2022 07:00  --------------------------------------------------------  IN: 600 mL / OUT: 800 mL / NET: -200 mL      LABS:                        9.7    9.88  )-----------( 242      ( 09 Dec 2022 07:25 )             29.7   12-09    135  |  96  |  9   ----------------------------<  110<H>  3.7   |  29  |  0.70    Ca    9.3      09 Dec 2022 07:24    TPro  6.2  /  Alb  2.7<L>  /  TBili  0.8  /  DBili  x   /  AST  14  /  ALT  11  /  AlkPhos  50  12-08

## 2022-12-09 NOTE — PROGRESS NOTE ADULT - PROBLEM SELECTOR PLAN 2
Pt with episode of hypoxia to mid 80s with ambulation 12/2, resolved with rest and 2LNC  -S/p RRT later in the evening for hypoxia, suspect 2nd to pulm edema. Repeat CXR with increased congestion, proBNP elevated, had been receiving IV fluids  -Elevated ddimer noted. Unable to do CTA 2nd to contrast allergy, pt refusing VQ scan  -Ddimer downtrending this AM  -LE duplex negative for DVT  -RVP negative  -Dyspnea, O2 requirements appear to be improving with diuresis  -Keep O>I as tolerated   -Keep sats >90% with O2 as needed (currently 1LNC). Wean O2 as tolerated  -ABG with no CO2 retention, continue to monitor off Bipap.

## 2022-12-09 NOTE — PROGRESS NOTE ADULT - SUBJECTIVE AND OBJECTIVE BOX
Follow-up Pulm Progress Note    O2 sats 97% on 1LNC  Denies CP, SOB    Medications:  MEDICATIONS  (STANDING):  benzonatate 100 milliGRAM(s) Oral every 8 hours  chlorhexidine 2% Cloths 1 Application(s) Topical daily  clopidogrel Tablet 75 milliGRAM(s) Oral daily  ethacrynic acid 25 milliGRAM(s) Oral daily  metoprolol tartrate 12.5 milliGRAM(s) Oral two times a day  polyethylene glycol 3350 17 Gram(s) Oral daily    Vital Signs Last 24 Hrs  T(C): 36.8 (09 Dec 2022 00:07), Max: 36.8 (09 Dec 2022 00:07)  T(F): 98.3 (09 Dec 2022 00:07), Max: 98.3 (09 Dec 2022 00:07)  HR: 84 (09 Dec 2022 00:07) (75 - 86)  BP: 160/71 (09 Dec 2022 00:07) (136/61 - 160/71)  BP(mean): --  RR: 18 (09 Dec 2022 00:07) (18 - 18)  SpO2: 93% (09 Dec 2022 07:39) (91% - 95%)    Parameters below as of 09 Dec 2022 07:39  Patient On (Oxygen Delivery Method): nasal cannula  O2 Flow (L/min): 1    12-08 @ 07:01  -  12-09 @ 07:00  --------------------------------------------------------  IN: 600 mL / OUT: 800 mL / NET: -200 mL    LABS:                        9.7    9.88  )-----------( 242      ( 09 Dec 2022 07:25 )             29.7     12-09    135  |  96  |  9   ----------------------------<  110<H>  3.7   |  29  |  0.70    Ca    9.3      09 Dec 2022 07:24    TPro  6.2  /  Alb  2.7<L>  /  TBili  0.8  /  DBili  x   /  AST  14  /  ALT  11  /  AlkPhos  50  12-08    Procalcitonin, Serum: 0.09 ng/mL (12-09-22 @ 07:24)    Serum Pro-Brain Natriuretic Peptide: 8058 pg/mL (12-09-22 @ 07:24)    CULTURES:   Culture - Urine (collected 12-01-22 @ 18:42)  Source: Clean Catch Clean Catch (Midstream)  Final Report (12-03-22 @ 09:19):    <10,000 CFU/mL Normal Urogenital Dulce    Physical Examination:  PULM: Decreased at bases, few scattered crackles  CVS: S1, S2 heard    RADIOLOGY REVIEWED  CXR: b/l effusions, pulm congestion

## 2022-12-09 NOTE — PROGRESS NOTE ADULT - SUBJECTIVE AND OBJECTIVE BOX
89 yo woman  PMHx of macular degeneration (appears to be legally blind), severe presbycusis, and hypertension presents after 1 week of abdominal distention mild nausea enlarging abdomen went to PMD Dr. Villalobos having routine blood work done for this issue and sent in today for evaluation after lipase greater than 6000.  Denies previous abdominal surgeries denies constipation diarrhea Patient has been resting comfortably.  Patient had an episode of shortness of breath 12/2. Started on ethacrynic acid with improvement in symptoms. Patient moved to [facility] for further monitoring. Patient states abdominal pain has improved. Patient requiring 1 liter via NC. Seen OOB sitting in a chair eating lunch       MEDICATIONS  (STANDING):  benzonatate 100 milliGRAM(s) Oral every 8 hours  chlorhexidine 2% Cloths 1 Application(s) Topical daily  clopidogrel Tablet 75 milliGRAM(s) Oral daily  ethacrynic acid 25 milliGRAM(s) Oral daily  metoprolol tartrate 12.5 milliGRAM(s) Oral two times a day  polyethylene glycol 3350 17 Gram(s) Oral daily    MEDICATIONS  (PRN):  acetaminophen     Tablet .. 650 milliGRAM(s) Oral every 6 hours PRN Mild Pain (1 - 3)          VITALS:   T(C): 36.4 (12-09-22 @ 11:05), Max: 36.8 (12-09-22 @ 00:07)  HR: 64 (12-09-22 @ 11:05) (64 - 86)  BP: 129/69 (12-09-22 @ 11:05) (129/69 - 160/71)  RR: 18 (12-09-22 @ 14:46) (18 - 18)  SpO2: 86% (12-09-22 @ 14:46) (86% - 95%)  Wt(kg): --    PHYSICAL EXAM:  GENERAL: NAD, well nourished and conversant  HEAD:  Atraumatic  EYES: EOM, PERRLA, conjunctiva pink and sclera white  ENT: No tonsillar erythema, exudates, or enlargement, moist mucous membranes, good dentition, no lesions  NECK: Supple, No JVD, normal thyroid, carotids with normal upstrokes and no bruits  CHEST/LUNG: Clear to auscultation bilaterally, No rales, rhonchi, wheezing, or rubs  HEART: Regular rate and rhythm, No murmurs, rubs, or gallops  ABDOMEN: Soft, nondistended, no masses, guarding, tenderness or rebound, bowel sounds present  EXTREMITIES:  2+ Peripheral Pulses, No clubbing, cyanosis, or edema.   LYMPH: No lymphadenopathy noted  SKIN: No rashes or lesions  NERVOUS SYSTEM:  Alert & Oriented X3, normal cognitive function. Motor Strength 5/5 right upper and right lower.  5/5 left upper and left lower extremities, DTRs 2+ intact and symmetric      LABS:        CBC Full  -  ( 09 Dec 2022 07:25 )  WBC Count : 9.88 K/uL  RBC Count : 3.37 M/uL  Hemoglobin : 9.7 g/dL  Hematocrit : 29.7 %  Platelet Count - Automated : 242 K/uL  Mean Cell Volume : 88.1 fl  Mean Cell Hemoglobin : 28.8 pg  Mean Cell Hemoglobin Concentration : 32.7 gm/dL  Auto Neutrophil # : x  Auto Lymphocyte # : x  Auto Monocyte # : x  Auto Eosinophil # : x  Auto Basophil # : x  Auto Neutrophil % : x  Auto Lymphocyte % : x  Auto Monocyte % : x  Auto Eosinophil % : x  Auto Basophil % : x    12-09    135  |  96  |  9   ----------------------------<  110<H>  3.7   |  29  |  0.70    Ca    9.3      09 Dec 2022 07:24    TPro  6.2  /  Alb  2.7<L>  /  TBili  0.8  /  DBili  x   /  AST  14  /  ALT  11  /  AlkPhos  50  12-08    LIVER FUNCTIONS - ( 08 Dec 2022 09:26 )  Alb: 2.7 g/dL / Pro: 6.2 g/dL / ALK PHOS: 50 U/L / ALT: 11 U/L / AST: 14 U/L / GGT: x               CAPILLARY BLOOD GLUCOSE          RADIOLOGY & ADDITIONAL TESTS:

## 2022-12-09 NOTE — PROGRESS NOTE ADULT - SUBJECTIVE AND OBJECTIVE BOX
Medications:  acetaminophen     Tablet .. 650 milliGRAM(s) Oral every 6 hours PRN  benzonatate 100 milliGRAM(s) Oral every 8 hours  chlorhexidine 2% Cloths 1 Application(s) Topical daily  clopidogrel Tablet 75 milliGRAM(s) Oral daily  ethacrynic acid 25 milliGRAM(s) Oral daily  metoprolol tartrate 12.5 milliGRAM(s) Oral two times a day  polyethylene glycol 3350 17 Gram(s) Oral daily    PMH/PSH/FH/SH:  Unchanged    Vitals:  T(C): 36.4 (12-09-22 @ 11:05), Max: 36.8 (12-09-22 @ 00:07)  HR: 64 (12-09-22 @ 11:05) (64 - 86)  BP: 129/69 (12-09-22 @ 11:05) (129/69 - 160/71)  RR: 18 (12-09-22 @ 14:46) (18 - 18)  SpO2: 92% (12-09-22 @ 15:00) (86% - 95%)         I&O's Summary  08 Dec 2022 07:01  -  09 Dec 2022 07:00  --------------------------------------------------------  IN: 600 mL / OUT: 800 mL / NET: -200 mL    09 Dec 2022 07:01  -  09 Dec 2022 15:34  --------------------------------------------------------  IN: 150 mL / OUT: 100 mL / NET: 50 mL      TELE:        LABS:                      9.7    9.88  )-----------( 242      ( 09 Dec 2022 07:25 )             29.7     12-09  135  |  96  |  9   ----------------------------<  110<H>  3.7   |  29  |  0.70    Ca    9.3      09 Dec 2022 07:24    TPro  6.2  /  Alb  2.7<L>  /  TBili  0.8  /  DBili  x   /  AST  14  /  ALT  11  /  AlkPhos  50  12-08    Serum Pro-Brain Natriuretic Peptide: 8058 pg/mL (12-09-22 @ 07:24)  Serum Pro-Brain Natriuretic Peptide: 36308 pg/mL (12-04-22 @ 22:38)  Serum Pro-Brain Natriuretic Peptide: 3636 pg/mL (12-02-22 @ 19:22)      Xray Chest 1 View- PORTABLE-Routine (Xray Chest 1 View- PORTABLE-Routine in AM.) (12.08.22 @ 09:46)   ACC: [id number] EXAM:  XR CHEST PORTABLE ROUTINE 1V                        PROCEDURE DATE:  12/08/2022      INTERPRETATION:  Chest one view    HISTORY: Pleural effusions    COMPARISON STUDY: 12/5/2022    Frontal expiratory view of the chest shows the heart to be similar in  size. The lungs show increased central congestion with slight increase of  pleural effusions and there is no evidence of pneumothorax.     IMPRESSION: Increased congestive changes as noted.  Thank you for the courtesy of this referral.    FLORENCIA ARGUETA MD; Attending Interventional Radiologist  This document has been electronically signed. Dec  9 2022 10:20AM Alert, no distress.  Tolerating modest po intake.  No CP, palps; ?mild dyspnea at times.       Medications:  acetaminophen     Tablet .. 650 milliGRAM(s) Oral every 6 hours PRN  benzonatate 100 milliGRAM(s) Oral every 8 hours  chlorhexidine 2% Cloths 1 Application(s) Topical daily  clopidogrel Tablet 75 milliGRAM(s) Oral daily  ethacrynic acid 25 milliGRAM(s) Oral daily  metoprolol tartrate 12.5 milliGRAM(s) Oral two times a day  polyethylene glycol 3350 17 Gram(s) Oral daily    PMH/PSH/FH/SH:  Unchanged    Vitals:  T(C): 36.4 (12-09-22 @ 11:05), Max: 36.8 (12-09-22 @ 00:07)  HR: 64 (12-09-22 @ 11:05) (64 - 86)  BP: 129/69 (12-09-22 @ 11:05) (129/69 - 160/71)  RR: 18 (12-09-22 @ 14:46) (18 - 18)  SpO2: 92% (12-09-22 @ 15:00) (86% - 95%)    EXAM:  GENERAL:  Alert, no distress  HEENT: Normocephalic, EOM intact, PERRLA  NECK: Normal carotid upstrokes, no bruit; No JVD  CHEST:  Clear to auscultation  HEART: PMI not displaced. Normal S1 and S2.  No murmur, rub or gallop.  ABDOMEN: Normal bowel sounds, no bruit. Soft, non-tender.  Liver and spleen not palpable; aorta not palpable.  EXT:  No edema, no varicosities, 2+ pulses in upper and lower extremities.  PSYCH:  A&Ox3, normal insight, no anxiety  NEURO: Non-focal  SKIN:  No rash       TELE:   NSR, currently 90 bpm.       I&O's Summary  08 Dec 2022 07:01  -  09 Dec 2022 07:00  --------------------------------------------------------  IN: 600 mL / OUT: 800 mL / NET: -200 mL    09 Dec 2022 07:01  -  09 Dec 2022 15:34  --------------------------------------------------------  IN: 150 mL / OUT: 100 mL / NET: 50 mL      LABS:                      9.7    9.88  )-----------( 242      ( 09 Dec 2022 07:25 )             29.7     12-09  135  |  96  |  9   ----------------------------<  110<H>  3.7   |  29  |  0.70    Ca    9.3      09 Dec 2022 07:24    TPro  6.2  /  Alb  2.7<L>  /  TBili  0.8  /  DBili  x   /  AST  14  /  ALT  11  /  AlkPhos  50  12-08    Serum Pro-Brain Natriuretic Peptide: 8058 pg/mL (12-09-22 @ 07:24)  Serum Pro-Brain Natriuretic Peptide: 12570 pg/mL (12-04-22 @ 22:38)  Serum Pro-Brain Natriuretic Peptide: 3636 pg/mL (12-02-22 @ 19:22)      Xray Chest 1 View- PORTABLE-Routine (Xray Chest 1 View- PORTABLE-Routine in AM.) (12.08.22 @ 09:46)   ACC: [id number] EXAM:  XR CHEST PORTABLE ROUTINE 1V                        PROCEDURE DATE:  12/08/2022      INTERPRETATION:  Chest one view    HISTORY: Pleural effusions    COMPARISON STUDY: 12/5/2022    Frontal expiratory view of the chest shows the heart to be similar in  size. The lungs show increased central congestion with slight increase of  pleural effusions and there is no evidence of pneumothorax.     IMPRESSION: Increased congestive changes as noted.  Thank you for the courtesy of this referral.    FLORENCIA ARGUETA MD; Attending Interventional Radiologist  This document has been electronically signed. Dec  9 2022 10:20AM

## 2022-12-09 NOTE — PROGRESS NOTE ADULT - ASSESSMENT
Tania TysonSriniYomaira is a 89 yo F with a hx of HTN, PAD with intermittent claudication, RBBB and low-grade atrial ectopy.  Hx. 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  Presented to ED after 1 week of abdominal distention associated with mild nausea and abdominal distention.  Saw Dr. Villalobos, and blood work checked, revealing lipase > 6000; referred to ED.  CT scan c/w pancreatitis.  Developed dyspnea/presents with hypoxemic respiratory failure attributed to flash pulmonary edema after IVF administration for pancreatitis.  Echo on 12/3 showed mild to moderate MR, mild LV dysfunction with EF 50% with akinesis of apical-septum, apex and apical inferior wall.  Mild diastolic LV dysfunction and mild elevation of PA pressure also noted.    REC:  1.  Dyspnea, due to pulmonary congestion/CHF.  Sxs. improving as is BNP, although residual findings seen on CXR.   Echo showed mild systolic and diastolic dysfunction; mild to moderate MR noted.  - increase ethacrynic acid to 50 mg. daily .  - repeat BMP in AM    2. No significant rhythm disturbances on telemetry.  - continue metoprolol  -  may d/c tele    3.  Pancreatitis  - continue management per GI consultant    Kale Khan M.D.  Office: [phone]

## 2022-12-10 ENCOUNTER — TRANSCRIPTION ENCOUNTER (OUTPATIENT)
Age: 87
End: 2022-12-10

## 2022-12-10 VITALS
DIASTOLIC BLOOD PRESSURE: 67 MMHG | SYSTOLIC BLOOD PRESSURE: 118 MMHG | RESPIRATION RATE: 18 BRPM | TEMPERATURE: 98 F | OXYGEN SATURATION: 97 % | HEART RATE: 68 BPM

## 2022-12-10 LAB
ANION GAP SERPL CALC-SCNC: 11 MMOL/L — SIGNIFICANT CHANGE UP (ref 5–17)
BUN SERPL-MCNC: 10 MG/DL — SIGNIFICANT CHANGE UP (ref 7–23)
CALCIUM SERPL-MCNC: 8.7 MG/DL — SIGNIFICANT CHANGE UP (ref 8.4–10.5)
CHLORIDE SERPL-SCNC: 98 MMOL/L — SIGNIFICANT CHANGE UP (ref 96–108)
CO2 SERPL-SCNC: 27 MMOL/L — SIGNIFICANT CHANGE UP (ref 22–31)
CREAT SERPL-MCNC: 0.73 MG/DL — SIGNIFICANT CHANGE UP (ref 0.5–1.3)
EGFR: 73 ML/MIN/1.73M2 — SIGNIFICANT CHANGE UP
GLUCOSE SERPL-MCNC: 86 MG/DL — SIGNIFICANT CHANGE UP (ref 70–99)
POTASSIUM SERPL-MCNC: 3.4 MMOL/L — LOW (ref 3.5–5.3)
POTASSIUM SERPL-SCNC: 3.4 MMOL/L — LOW (ref 3.5–5.3)
SODIUM SERPL-SCNC: 136 MMOL/L — SIGNIFICANT CHANGE UP (ref 135–145)

## 2022-12-10 PROCEDURE — 71045 X-RAY EXAM CHEST 1 VIEW: CPT

## 2022-12-10 PROCEDURE — 36415 COLL VENOUS BLD VENIPUNCTURE: CPT

## 2022-12-10 PROCEDURE — C8929: CPT

## 2022-12-10 PROCEDURE — 86901 BLOOD TYPING SEROLOGIC RH(D): CPT

## 2022-12-10 PROCEDURE — 82330 ASSAY OF CALCIUM: CPT

## 2022-12-10 PROCEDURE — 76705 ECHO EXAM OF ABDOMEN: CPT

## 2022-12-10 PROCEDURE — 82962 GLUCOSE BLOOD TEST: CPT

## 2022-12-10 PROCEDURE — 83735 ASSAY OF MAGNESIUM: CPT

## 2022-12-10 PROCEDURE — 99285 EMERGENCY DEPT VISIT HI MDM: CPT | Mod: 25

## 2022-12-10 PROCEDURE — 81001 URINALYSIS AUTO W/SCOPE: CPT

## 2022-12-10 PROCEDURE — 85025 COMPLETE CBC W/AUTO DIFF WBC: CPT

## 2022-12-10 PROCEDURE — 87086 URINE CULTURE/COLONY COUNT: CPT

## 2022-12-10 PROCEDURE — 97116 GAIT TRAINING THERAPY: CPT

## 2022-12-10 PROCEDURE — 85027 COMPLETE CBC AUTOMATED: CPT

## 2022-12-10 PROCEDURE — 83605 ASSAY OF LACTIC ACID: CPT

## 2022-12-10 PROCEDURE — 85379 FIBRIN DEGRADATION QUANT: CPT

## 2022-12-10 PROCEDURE — 93970 EXTREMITY STUDY: CPT

## 2022-12-10 PROCEDURE — 87640 STAPH A DNA AMP PROBE: CPT

## 2022-12-10 PROCEDURE — 80053 COMPREHEN METABOLIC PANEL: CPT

## 2022-12-10 PROCEDURE — 97530 THERAPEUTIC ACTIVITIES: CPT

## 2022-12-10 PROCEDURE — 82803 BLOOD GASES ANY COMBINATION: CPT

## 2022-12-10 PROCEDURE — U0005: CPT

## 2022-12-10 PROCEDURE — 96374 THER/PROPH/DIAG INJ IV PUSH: CPT

## 2022-12-10 PROCEDURE — 83690 ASSAY OF LIPASE: CPT

## 2022-12-10 PROCEDURE — 82553 CREATINE MB FRACTION: CPT

## 2022-12-10 PROCEDURE — 97162 PT EVAL MOD COMPLEX 30 MIN: CPT

## 2022-12-10 PROCEDURE — 97110 THERAPEUTIC EXERCISES: CPT

## 2022-12-10 PROCEDURE — 84295 ASSAY OF SERUM SODIUM: CPT

## 2022-12-10 PROCEDURE — 84100 ASSAY OF PHOSPHORUS: CPT

## 2022-12-10 PROCEDURE — 84478 ASSAY OF TRIGLYCERIDES: CPT

## 2022-12-10 PROCEDURE — 83880 ASSAY OF NATRIURETIC PEPTIDE: CPT

## 2022-12-10 PROCEDURE — 93005 ELECTROCARDIOGRAM TRACING: CPT

## 2022-12-10 PROCEDURE — 84145 PROCALCITONIN (PCT): CPT

## 2022-12-10 PROCEDURE — 80048 BASIC METABOLIC PNL TOTAL CA: CPT

## 2022-12-10 PROCEDURE — 0225U NFCT DS DNA&RNA 21 SARSCOV2: CPT

## 2022-12-10 PROCEDURE — 86850 RBC ANTIBODY SCREEN: CPT

## 2022-12-10 PROCEDURE — 94660 CPAP INITIATION&MGMT: CPT

## 2022-12-10 PROCEDURE — 82435 ASSAY OF BLOOD CHLORIDE: CPT

## 2022-12-10 PROCEDURE — 74176 CT ABD & PELVIS W/O CONTRAST: CPT | Mod: MA

## 2022-12-10 PROCEDURE — 87641 MR-STAPH DNA AMP PROBE: CPT

## 2022-12-10 PROCEDURE — U0003: CPT

## 2022-12-10 PROCEDURE — 84484 ASSAY OF TROPONIN QUANT: CPT

## 2022-12-10 PROCEDURE — 85014 HEMATOCRIT: CPT

## 2022-12-10 PROCEDURE — 80061 LIPID PANEL: CPT

## 2022-12-10 PROCEDURE — 86900 BLOOD TYPING SEROLOGIC ABO: CPT

## 2022-12-10 PROCEDURE — 82550 ASSAY OF CK (CPK): CPT

## 2022-12-10 PROCEDURE — 82947 ASSAY GLUCOSE BLOOD QUANT: CPT

## 2022-12-10 PROCEDURE — 85018 HEMOGLOBIN: CPT

## 2022-12-10 PROCEDURE — 84132 ASSAY OF SERUM POTASSIUM: CPT

## 2022-12-10 RX ORDER — POTASSIUM CHLORIDE 20 MEQ
40 PACKET (EA) ORAL ONCE
Refills: 0 | Status: COMPLETED | OUTPATIENT
Start: 2022-12-10 | End: 2022-12-10

## 2022-12-10 RX ORDER — METOPROLOL TARTRATE 50 MG
0.5 TABLET ORAL
Qty: 30 | Refills: 0
Start: 2022-12-10 | End: 2023-01-08

## 2022-12-10 RX ORDER — POLYETHYLENE GLYCOL 3350 17 G/17G
17 POWDER, FOR SOLUTION ORAL
Qty: 0 | Refills: 0 | DISCHARGE
Start: 2022-12-10

## 2022-12-10 RX ORDER — ETHACRYNIC ACID 25 MG/1
2 TABLET ORAL
Qty: 0 | Refills: 0 | DISCHARGE
Start: 2022-12-10

## 2022-12-10 RX ORDER — POTASSIUM CHLORIDE 20 MEQ
2 PACKET (EA) ORAL
Qty: 0 | Refills: 0 | DISCHARGE
Start: 2022-12-10

## 2022-12-10 RX ADMIN — CLOPIDOGREL BISULFATE 75 MILLIGRAM(S): 75 TABLET, FILM COATED ORAL at 12:47

## 2022-12-10 RX ADMIN — Medication 100 MILLIGRAM(S): at 05:40

## 2022-12-10 RX ADMIN — POLYETHYLENE GLYCOL 3350 17 GRAM(S): 17 POWDER, FOR SOLUTION ORAL at 12:47

## 2022-12-10 RX ADMIN — Medication 12.5 MILLIGRAM(S): at 05:40

## 2022-12-10 RX ADMIN — Medication 40 MILLIEQUIVALENT(S): at 12:47

## 2022-12-10 RX ADMIN — ETHACRYNIC ACID 50 MILLIGRAM(S): 25 TABLET ORAL at 05:40

## 2022-12-10 RX ADMIN — CHLORHEXIDINE GLUCONATE 1 APPLICATION(S): 213 SOLUTION TOPICAL at 13:00

## 2022-12-10 NOTE — PROGRESS NOTE ADULT - PROBLEM SELECTOR PROBLEM 2
HTN (hypertension)
HTN (hypertension)
Hypoxia
HTN (hypertension)
Hypoxia
HTN (hypertension)
HTN (hypertension)
Hypoxia
HTN (hypertension)

## 2022-12-10 NOTE — DISCHARGE NOTE PROVIDER - PROVIDER TOKENS
PROVIDER:[TOKEN:[[id number]],FOLLOWUP:[Routine]],FREE:[LAST:[Thu],FIRST:[Dakota],PHONE:[phone]],FAX:[(   )    -]],PROVIDER:[TOKEN:[[id number]],FOLLOWUP:[Routine]]

## 2022-12-10 NOTE — PROGRESS NOTE ADULT - SUBJECTIVE AND OBJECTIVE BOX
[facility]  Keegan Cox PA-C  [address] [place] [id number] [phone]    INTERVAL HPI/OVERNIGHT EVENTS:  Pt seen and examined, no new events  tolerating diet, no N/V/D or abdominal pain    MEDICATIONS  (STANDING):  chlorhexidine 2% Cloths 1 Application(s) Topical daily  clopidogrel Tablet 75 milliGRAM(s) Oral daily  lactated ringers. 1000 milliLiter(s) (75 mL/Hr) IV Continuous <Continuous>  ondansetron Injectable 4 milliGRAM(s) IV Push once  polyethylene glycol 3350 17 Gram(s) Oral daily    Vital Signs Last 24 Hrs  T(C): 36.6 (10 Dec 2022 04:43), Max: 36.8 (10 Dec 2022 00:51)  T(F): 97.9 (10 Dec 2022 04:43), Max: 98.2 (10 Dec 2022 00:51)  HR: 80 (10 Dec 2022 04:43) (64 - 90)  BP: 130/65 (10 Dec 2022 04:43) (129/69 - 152/70)  BP(mean): --  RR: 18 (10 Dec 2022 04:43) (18 - 18)  SpO2: 92% (10 Dec 2022 04:43) (86% - 95%)    Parameters below as of 10 Dec 2022 04:43  Patient On (Oxygen Delivery Method): nasal cannula  O2 Flow (L/min): 1      Physical:  General: A&Ox3. NAD.  Abdomen: Soft nondistended, nontender.    I&O's Summary    09 Dec 2022 07:01  -  10 Dec 2022 07:00  --------------------------------------------------------  IN: 400 mL / OUT: 600 mL / NET: -200 mL        LABS:                        9.7    9.88  )-----------( 242      ( 09 Dec 2022 07:25 )             29.7   12-09    135  |  96  |  9   ----------------------------<  110<H>  3.7   |  29  |  0.70    Ca    9.3      09 Dec 2022 07:24    TPro  6.2  /  Alb  2.7<L>  /  TBili  0.8  /  DBili  x   /  AST  14  /  ALT  11  /  AlkPhos  50  12-08

## 2022-12-10 NOTE — PROGRESS NOTE ADULT - PROBLEM SELECTOR PLAN 3
Reports of hemoptysis 12/4  -Pt refusing VQ scan  -Clinically improving  -No episodes of further hemoptysis per RN  -Monitor respiratory status  -Monitor for increased hemoptysis.

## 2022-12-10 NOTE — DISCHARGE NOTE NURSING/CASE MANAGEMENT/SOCIAL WORK - NSDCPEFALRISK_GEN_ALL_CORE
For information on Fall & Injury Prevention, visit: [email or web address] OR  [email or web address] [place]  https://www.cdc.gov/steadi/patient.html

## 2022-12-10 NOTE — DISCHARGE NOTE NURSING/CASE MANAGEMENT/SOCIAL WORK - PATIENT PORTAL LINK FT
You can access the FollowMyHealth Patient Portal offered by [facility] by registering at the following website: [email or web address]. By joining [email or web address]’s FollowMyHealth portal, you will also be able to view your health information using other applications (apps) compatible with our system.

## 2022-12-10 NOTE — DISCHARGE NOTE PROVIDER - CARE PROVIDERS DIRECT ADDRESSES
[email or web address],DirectAddress_Unknown,DirectAddress_Unknown

## 2022-12-10 NOTE — DISCHARGE NOTE PROVIDER - CARE PROVIDER_API CALL
Kale Khan (MD)  Cardiovascular Disease; Internal Medicine  [address], [address]  [place] [address]  Phone: [phone]  Fax: [phone]  Follow Up Time: Routine    Dakota Andino  Phone: [phone]  Fax: (   )    -  Follow Up Time:     Thompson Massey (MD)  Critical Care Medicine  [address] [place] [address]  [place] [address]  Phone: [phone]  Fax: [phone]  Follow Up Time: Routine

## 2022-12-10 NOTE — PROGRESS NOTE ADULT - PROBLEM SELECTOR PLAN 2
Pt with episode of hypoxia to mid 80s with ambulation 12/2, resolved with rest and 2LNC  -S/p RRT later in the evening for hypoxia, suspect 2nd to pulm edema. Repeat CXR with increased congestion, proBNP elevated, had been receiving IV fluids  -Elevated ddimer noted. Unable to do CTA 2nd to contrast allergy, pt refusing VQ scan  -Ddimer downtrending now  -LE duplex negative for DVT  -RVP negative  -Dyspnea, O2 requirements improving with diuresis  -ABG with no CO2 retention, continue to monitor off Bipap  -Mild hypoxia on RA, suspect 2nd to residual pulmonary edema. Keep O>I as tolerated. Keep sats >90% with O2 as needed (currently 1LNC).

## 2022-12-10 NOTE — PROGRESS NOTE ADULT - SUBJECTIVE AND OBJECTIVE BOX
89 yo woman  PMHx of macular degeneration (appears to be legally blind), severe presbycusis, and hypertension presents after 1 week of abdominal distention mild nausea enlarging abdomen went to PMD Dr. Villalobos having routine blood work done for this issue and sent in today for evaluation after lipase greater than 6000.  Denies previous abdominal surgeries denies constipation diarrhea Patient has been resting comfortably.  Patient had an episode of shortness of breath 12/2. Started on ethacrynic acid with improvement in symptoms. Patient moved to [facility] for further monitoring. Patient states abdominal pain has improved. Patient requiring 1 liter via NC. Patient is scheduled for DC to rehab      MEDICATIONS  (STANDING):  benzonatate 100 milliGRAM(s) Oral every 8 hours  chlorhexidine 2% Cloths 1 Application(s) Topical daily  clopidogrel Tablet 75 milliGRAM(s) Oral daily  ethacrynic acid 50 milliGRAM(s) Oral daily  metoprolol tartrate 12.5 milliGRAM(s) Oral two times a day  polyethylene glycol 3350 17 Gram(s) Oral daily    MEDICATIONS  (PRN):  acetaminophen     Tablet .. 650 milliGRAM(s) Oral every 6 hours PRN Mild Pain (1 - 3)          VITALS:   T(C): 36.6 (12-10-22 @ 04:43), Max: 36.8 (12-10-22 @ 00:51)  HR: 80 (12-10-22 @ 04:43) (80 - 90)  BP: 130/65 (12-10-22 @ 04:43) (130/65 - 152/70)  RR: 18 (12-10-22 @ 10:00) (18 - 18)  SpO2: 92% (12-10-22 @ 10:00) (86% - 95%)  Wt(kg): --    PHYSICAL EXAM:  GENERAL: NAD, well nourished and conversant  HEAD:  Atraumatic  EYES: EOM, PERRLA, conjunctiva pink and sclera white  ENT: No tonsillar erythema, exudates, or enlargement, moist mucous membranes, good dentition, no lesions  NECK: Supple, No JVD, normal thyroid, carotids with normal upstrokes and no bruits  CHEST/LUNG: Clear to auscultation bilaterally, No rales, rhonchi, wheezing, or rubs  HEART: Regular rate and rhythm, No murmurs, rubs, or gallops  ABDOMEN: Soft, nondistended, no masses, guarding, tenderness or rebound, bowel sounds present  EXTREMITIES:  2+ Peripheral Pulses, No clubbing, cyanosis, or edema.   LYMPH: No lymphadenopathy noted  SKIN: No rashes or lesions  NERVOUS SYSTEM:  Alert & Oriented X3, normal cognitive function. Motor Strength 5/5 right upper and right lower.  5/5 left upper and left lower extremities, DTRs 2+ intact and symmetric    LABS:        CBC Full  -  ( 09 Dec 2022 07:25 )  WBC Count : 9.88 K/uL  RBC Count : 3.37 M/uL  Hemoglobin : 9.7 g/dL  Hematocrit : 29.7 %  Platelet Count - Automated : 242 K/uL  Mean Cell Volume : 88.1 fl  Mean Cell Hemoglobin : 28.8 pg  Mean Cell Hemoglobin Concentration : 32.7 gm/dL  Auto Neutrophil # : x  Auto Lymphocyte # : x  Auto Monocyte # : x  Auto Eosinophil # : x  Auto Basophil # : x  Auto Neutrophil % : x  Auto Lymphocyte % : x  Auto Monocyte % : x  Auto Eosinophil % : x  Auto Basophil % : x    12-10    136  |  98  |  10  ----------------------------<  86  3.4<L>   |  27  |  0.73    Ca    8.7      10 Dec 2022 06:39            CAPILLARY BLOOD GLUCOSE          RADIOLOGY & ADDITIONAL TESTS:

## 2022-12-10 NOTE — PROGRESS NOTE ADULT - PROVIDER SPECIALTY LIST ADULT
Gastroenterology
Cardiology
Gastroenterology
Internal Medicine
Pulmonology
Cardiology
Gastroenterology
Internal Medicine
Internal Medicine
Pulmonology
Internal Medicine
Pulmonology
Internal Medicine
Internal Medicine

## 2022-12-10 NOTE — PROGRESS NOTE ADULT - PROBLEM SELECTOR PLAN 1
CXR with pulm edema  -ProBNP elevated, downtrending this AM  -TTE with mild diastolic dysfunction  -Diuresis per cards  -CXR 12/8 grossly unchanged, but dyspnea/O2 requirements improving  -Keep O>I as tolerated.
continue IV fluids  Patient on a clear liquid diet  GI following  Pain meds as needed  Lipase has been decreasing  continue to monitor symptoms
diet advanced  as per GI no need to continue to trend lipase  repeat blood in 2 weeks   Patient denies abdominal pain  Pain meds as needed  follow for BMs
IV fluid held  diet advanced  Lipase continues to increase  will need to discuss with GI  May need to continue NPO  Patient denies abdominal pain  GI following  Pain meds as needed  follow for BMs
diet advanced  as per GI no need to continue to trend lipase  repeat blood in 2 weeks   Patient denies abdominal pain  Pain meds as needed  follow for BMs
CXR with pulm edema  -ProBNP elevated  -TTE with mild diastolic dysfunction  -Diuresis per cards  -CXR in AM.
CXR with pulm edema  -ProBNP elevated, downtrending now  -TTE with mild diastolic dysfunction  -Diuresis per cards  -CXR 12/8 grossly unchanged, but dyspnea/O2 requirements improving  -Keep O>I as tolerated.
IV fluid held  Keep NPO for now  will restart clear liquid tomorrow  GI following  Pain meds as needed  Lipase decreasing today  continue to monitor symptoms
CXR with pulm edema  -ProBNP elevated  -TTE with mild diastolic dysfunction  -Diuresis per cards  -Eventual repeat CXR.
IV fluid held  Patient on a clear liquid diet  GI following  Pain meds as needed  Lipase increased today  awaiting further GI input  continue to monitor symptoms
IV fluid held  restart clear liquid diet  will restart clear liquid tomorrow  GI following  Pain meds as needed  continue to monitor lipase  follow for BMs
CXR with pulm edema  -ProBNP elevated, repeat in AM  -TTE with mild diastolic dysfunction  -Diuresis per cards  -CXR this AM grossly unchanged, but dyspnea/O2 requirements improving  -Keep O>I as tolerated.
IV fluid held  diet advanced  continue to monitor lipase  GI following  Pain meds as needed  follow for BMs
CXR with pulm edema  -ProBNP elevated  -TTE with mild diastolic dysfunction  -Diuresis per cards.
continue IV fluids  Patient is NPO 'follow lipase  GI following  Pain meds as needed

## 2022-12-10 NOTE — PROGRESS NOTE ADULT - PROBLEM SELECTOR PLAN 3
continue diuretics  Patient is currently on ethacrynic acid May need to increase dose to BID  Patient seen by pulmonary  O2 sats have been improving  Patient currently on 1 Liter NC  will try to wean off completely

## 2022-12-10 NOTE — PROGRESS NOTE ADULT - NUTRITIONAL ASSESSMENT
This patient has been assessed with a concern for Malnutrition and has been determined to have a diagnosis/diagnoses of Severe protein-calorie malnutrition.    This patient is being managed with:   Diet Soft and Bite Sized-  Supplement Feeding Modality:  Oral  Ensure Plus High Protein Cans or Servings Per Day:  2       Frequency:  Daily  Entered: Dec  9 2022 12:17PM    
This patient has been assessed with a concern for Malnutrition and has been determined to have a diagnosis/diagnoses of Severe protein-calorie malnutrition.    This patient is being managed with:   Diet Soft and Bite Sized-  Supplement Feeding Modality:  Oral  Ensure Plus High Protein Cans or Servings Per Day:  2       Frequency:  Daily  Entered: Dec  7 2022  5:29PM    Diet Soft and Bite Sized-  Entered: Dec  6 2022  5:32PM    The following pending diet order is being considered for treatment of Severe protein-calorie malnutrition:null
This patient has been assessed with a concern for Malnutrition and has been determined to have a diagnosis/diagnoses of Severe protein-calorie malnutrition.    This patient is being managed with:   Diet Soft and Bite Sized-  Supplement Feeding Modality:  Oral  Ensure Plus High Protein Cans or Servings Per Day:  2       Frequency:  Daily  Entered: Dec  9 2022 12:17PM    
This patient has been assessed with a concern for Malnutrition and has been determined to have a diagnosis/diagnoses of Severe protein-calorie malnutrition.    This patient is being managed with:   Diet Clear Liquid-  Entered: Dec  8 2022 11:57AM

## 2022-12-10 NOTE — PROGRESS NOTE ADULT - PROBLEM SELECTOR PROBLEM 1
Pancreatitis
Pulmonary edema
Pulmonary edema
Pancreatitis
Pancreatitis
Pulmonary edema
Pulmonary edema
Pancreatitis
Pulmonary edema
Pancreatitis
Pulmonary edema
Pancreatitis

## 2022-12-10 NOTE — PROGRESS NOTE ADULT - PROBLEM SELECTOR PROBLEM 3
Hemoptysis
Hypoxia
Hypoxia
Hemoptysis
Hypoxia
Hemoptysis
Hypoxia
Hemoptysis
Hypoxia

## 2022-12-10 NOTE — PROGRESS NOTE ADULT - SUBJECTIVE AND OBJECTIVE BOX
Follow-up Pulm Progress Note    O2 sats 94% on 1LNC  Dyspnea improving  Denies CP    Medications:  MEDICATIONS  (STANDING):  benzonatate 100 milliGRAM(s) Oral every 8 hours  chlorhexidine 2% Cloths 1 Application(s) Topical daily  clopidogrel Tablet 75 milliGRAM(s) Oral daily  ethacrynic acid 50 milliGRAM(s) Oral daily  metoprolol tartrate 12.5 milliGRAM(s) Oral two times a day  polyethylene glycol 3350 17 Gram(s) Oral daily  potassium chloride    Tablet ER 40 milliEquivalent(s) Oral once    MEDICATIONS  (PRN):  acetaminophen     Tablet .. 650 milliGRAM(s) Oral every 6 hours PRN Mild Pain (1 - 3)      Vital Signs Last 24 Hrs  T(C): 36.6 (10 Dec 2022 04:43), Max: 36.8 (10 Dec 2022 00:51)  T(F): 97.9 (10 Dec 2022 04:43), Max: 98.2 (10 Dec 2022 00:51)  HR: 80 (10 Dec 2022 04:43) (80 - 90)  BP: 130/65 (10 Dec 2022 04:43) (130/65 - 152/70)  BP(mean): --  RR: 18 (10 Dec 2022 10:00) (18 - 18)  SpO2: 92% (10 Dec 2022 10:00) (86% - 95%)    Parameters below as of 10 Dec 2022 10:00  Patient On (Oxygen Delivery Method): room air      12-09 @ 07:01  -  12-10 @ 07:00  --------------------------------------------------------  IN: 400 mL / OUT: 600 mL / NET: -200 mL      LABS:                        9.7    9.88  )-----------( 242      ( 09 Dec 2022 07:25 )             29.7     12-10    136  |  98  |  10  ----------------------------<  86  3.4<L>   |  27  |  0.73    Ca    8.7      10 Dec 2022 06:39    Procalcitonin, Serum: 0.09 ng/mL (12-09-22 @ 07:24)    Serum Pro-Brain Natriuretic Peptide: 8058 pg/mL (12-09-22 @ 07:24)    CULTURES:      Culture - Urine (collected 12-01-22 @ 18:42)  Source: Clean Catch Clean Catch (Midstream)  Final Report (12-03-22 @ 09:19):    <10,000 CFU/mL Normal Urogenital Dulce    Physical Examination:  PULM: decreased at bases, few scattered crackles  CVS: S1, S2 heard    RADIOLOGY REVIEWED  CXR: 12/8 b/l effusions, pulm congestion

## 2022-12-10 NOTE — DISCHARGE NOTE PROVIDER - HOSPITAL COURSE
89 yo female h/o macular degeneration, htn, here with pancreatitis     Problem/Plan - 1:  ·  Problem: Pancreatitis.   ·  Plan: diet advanced  as per GI no need to continue to trend lipase  repeat blood in 2 weeks   Patient denies abdominal pain  Pain meds as needed  follow for BMs.     Problem/Plan - 2:  ·  Problem: HTN (hypertension).   ·  Plan: will continue to monitor BP  will add meds as needed.     Problem/Plan - 3:  ·  Problem: Hypoxia.   ·  Plan: continue diuretics  Patient is currently on ethacrynic acid May need to increase dose to BID  Patient seen by pulmonary  O2 sats have been improving  Patient currently on 1 Liter NC  will try to wean off completely.    Medically cleared for discharge to [facility].

## 2022-12-10 NOTE — DISCHARGE NOTE PROVIDER - NSDCMRMEDTOKEN_GEN_ALL_CORE_FT
benzonatate 100 mg oral capsule: 1 cap(s) orally every 8 hours  clopidogrel 75 mg oral tablet: 1 tab(s) orally once a day  ethacrynic acid 25 mg oral tablet: 2 tab(s) orally once a day  metoprolol tartrate 25 mg oral tablet: 0.5 tab(s) orally 2 times a day   polyethylene glycol 3350 oral powder for reconstitution: 17 gram(s) orally once a day  potassium chloride 20 mEq oral tablet, extended release: 2 tab(s) orally once  Tylenol 325 mg oral tablet: 2 tab(s) orally , As Needed

## 2022-12-10 NOTE — DISCHARGE NOTE PROVIDER - NSDCCPCAREPLAN_GEN_ALL_CORE_FT
PRINCIPAL DISCHARGE DIAGNOSIS  Diagnosis: Pancreatitis  Assessment and Plan of Treatment:   -CT non contrast with acute pancreatitis, no evidence of gallstones   -triglycerides are normal   -abdominal pain resolved  -check blood work (lipase) as outpatient in 1-2 weeks   -regular diet as tolerated      SECONDARY DISCHARGE DIAGNOSES  Diagnosis: Dyspnea  Assessment and Plan of Treatment: -due to pulmonary congestion/CHF. Echo showed mild systolic and diastolic dysfunction; mild to moderate MR noted.  -increase ethacrynic acid to 50 mg daily   -follow up with Dr Khan after discharge      Diagnosis: Hemoptysis  Assessment and Plan of Treatment:   Reports of hemoptysis 12/4  -Patient refusing VQ scan  -No episodes of gross hemoptysis, intermittent streaks of blood mixed with sputum per nurse  -Monitor respiratory status  -Monitor for increased hemoptysis    Diagnosis: Hypoxia  Assessment and Plan of Treatment: -Elevated ddimer noted. Unable to do CTA 2nd to contrast allergy, patient refusing VQ scan  -LE duplex negative for DVT  -RVP negative  -Keep O>I as tolerated   -Keep sats >90% with O2 as needed (currently 1LNC). Wean O2 as tolerated  -ABG with no CO2 retention  -follow up with pulmonary as outpatient

## 2022-12-10 NOTE — PROGRESS NOTE ADULT - ASSESSMENT
89y.o. Female abdominal pain   elevated lipase   pancreatitis, clinically improving      CT non con with acute pancreatitis, no evidence of gallstones   triglycerides are normal   now with flash pulm edema   cards/pulm following   no abdominal pain   no need to trend lipase, check as outpatient in 1-2 weeks   regular diet as tolerated  will follow  dc planning as per primary     Advanced care planning was discussed with patient and family.  Advanced care planning forms were reviewed and discussed.  Risks, benefits and alternatives of gastroenterologic procedures were discussed in detail and all questions were answered.    30 minutes spent.

## 2022-12-10 NOTE — PROGRESS NOTE ADULT - TIME BILLING
Discussed treatment plan with patient at bedside.
Discussed treatment plan with patient and daughter at bedside and son by phone.
Discussed treatment plan with patient at bedside.
Discussed treatment plan with patient at bedside.
Patient scheduled for DC to rehab  continue current meds  PO as tolerated  activity as tolerated  follow up with Dr Villalobos  Patient and family aware of plan

## 2023-01-01 NOTE — PHYSICAL THERAPY INITIAL EVALUATION ADULT - ADDITIONAL COMMENTS
show
Pt lives at [facility] alone; has HHA [id number] and 3days/week for 6 hours. Pt with recent falls and declining mobility. Pt owns RW, rollator, commode and shower chair.

## 2023-01-20 ENCOUNTER — NON-APPOINTMENT (OUTPATIENT)
Age: 88
End: 2023-01-20

## 2023-01-20 ENCOUNTER — APPOINTMENT (OUTPATIENT)
Dept: CARDIOLOGY | Facility: CLINIC | Age: 88
End: 2023-01-20
Payer: MEDICARE

## 2023-01-20 VITALS
HEART RATE: 71 BPM | RESPIRATION RATE: 17 BRPM | WEIGHT: 112 LBS | OXYGEN SATURATION: 96 % | DIASTOLIC BLOOD PRESSURE: 66 MMHG | HEIGHT: 58 IN | SYSTOLIC BLOOD PRESSURE: 172 MMHG | BODY MASS INDEX: 23.51 KG/M2

## 2023-01-20 DIAGNOSIS — Z92.89 PERSONAL HISTORY OF OTHER MEDICAL TREATMENT: ICD-10-CM

## 2023-01-20 PROCEDURE — 99214 OFFICE O/P EST MOD 30 MIN: CPT

## 2023-01-20 PROCEDURE — 93000 ELECTROCARDIOGRAM COMPLETE: CPT

## 2023-01-20 RX ORDER — MELOXICAM 15 MG/1
15 TABLET ORAL
Qty: 45 | Refills: 0 | Status: DISCONTINUED | COMMUNITY
Start: 2018-03-06 | End: 2023-01-20

## 2023-01-20 RX ORDER — CELECOXIB 200 MG/1
200 CAPSULE ORAL DAILY
Qty: 45 | Refills: 1 | Status: DISCONTINUED | COMMUNITY
Start: 2018-02-06 | End: 2023-01-20

## 2023-01-20 RX ORDER — IBUPROFEN 600 MG/1
600 TABLET, FILM COATED ORAL
Qty: 90 | Refills: 0 | Status: DISCONTINUED | COMMUNITY
Start: 2018-04-17 | End: 2023-01-20

## 2023-01-29 ENCOUNTER — NON-APPOINTMENT (OUTPATIENT)
Age: 88
End: 2023-01-29

## 2023-01-29 PROBLEM — Z92.89 HISTORY OF RECENT HOSPITALIZATION: Status: ACTIVE | Noted: 2023-01-29

## 2023-01-29 NOTE — REASON FOR VISIT
[Follow-Up - Clinic] : a clinic follow-up of [Abnormal ECG] : an abnormal ECG [Formal Caregiver] : formal caregiver [FreeTextEntry1] : \par \par

## 2023-01-29 NOTE — DISCUSSION/SUMMARY
[EKG obtained to assist in diagnosis and management of assessed problem(s)] : EKG obtained to assist in diagnosis and management of assessed problem(s) [FreeTextEntry1] : We reassured the patient and her daughter that her physical exam remained stable today.  EKG shows no significant interval change.\par \par We will have her restart on ethacrynic acid 25 mg a day as she had been taking during her hospital stay.  She will continue on a low-salt diet as well.  She will also continue the current dose of metoprolol.\par \par Given her recent episode of pancreatitis, she will also continue on a low fat diet. \par \par Follow up in 4 weeks for reassessment.

## 2023-01-29 NOTE — HISTORY OF PRESENT ILLNESS
[FreeTextEntry1] : \par She returns today for a follow-up visit.  She was hospitalized at [facility] from December 1 through December 10 of last year for pancreatitis.  She also has a history of hypertension.\par \par As she returns today, she has been feeling better.  Her GI symptoms have resolved and she reports no further nausea.  She is tolerating her current diet without problem.\par \par During her hospital stay, ethacrynic acid was added, as chest x-ray was consistent with mild congestive heart failure; in part this may have been a consequence of the need for intravenous fluid on admission as part of her treatment of pancreatitis.  Ethacrynic acid was chosen given her history of allergy to sulfa drugs.\par \par Asked to see her today, in the company of her daughter, Erika, she reports no episodes of exertional chest discomfort or dyspnea. She describes no significant palpitations. \par \par

## 2023-01-29 NOTE — PHYSICAL EXAM
[General Appearance - Well Developed] : well developed [Normal Appearance] : normal appearance [Well Groomed] : well groomed [General Appearance - Well Nourished] : well nourished [General Appearance - In No Acute Distress] : no acute distress [Conjunctiva] : the conjunctiva were normal in both eyes [Normal] : the eyelids were normal bilaterally [PERRL] : pupils were equal in size, round, and reactive to light [EOM Intact] : extraocular movements were intact [Normal Oral Mucosa] : normal oral mucosa [Normal Oropharynx] : normal oropharynx [Normal Jugular Venous A Waves Present] : normal jugular venous A waves present [Normal Jugular Venous V Waves Present] : normal jugular venous V waves present [Respiration, Rhythm And Depth] : normal respiratory rhythm and effort [Auscultation Breath Sounds / Voice Sounds] : lungs were clear to auscultation bilaterally [Heart Rate And Rhythm] : heart rate and rhythm were normal [Bowel Sounds] : normal bowel sounds [Nail Clubbing] : no clubbing of the fingernails [Cyanosis, Localized] : no localized cyanosis [Skin Color & Pigmentation] : normal skin color and pigmentation [] : no rash [Oriented To Time, Place, And Person] : oriented to person, place, and time [Impaired Insight] : insight and judgment were intact [Affect] : the affect was normal [Mood] : the mood was normal [Yellow Sclera (Icteric)] : no scleral icterus was seen [FreeTextEntry1] : Very mild lower extremity varicosities. Trace pedal pulses.

## 2023-01-29 NOTE — END OF VISIT
[FreeTextEntry3] : I saw the patient with Desire Interiano N.P.  I agree with the history, exam, assessment and plan as above.\luba Khan M.D.

## 2023-02-16 ENCOUNTER — NON-APPOINTMENT (OUTPATIENT)
Age: 88
End: 2023-02-16

## 2023-02-16 ENCOUNTER — APPOINTMENT (OUTPATIENT)
Dept: CARDIOLOGY | Facility: CLINIC | Age: 88
End: 2023-02-16
Payer: MEDICARE

## 2023-02-16 VITALS
DIASTOLIC BLOOD PRESSURE: 60 MMHG | WEIGHT: 113 LBS | OXYGEN SATURATION: 97 % | HEART RATE: 75 BPM | SYSTOLIC BLOOD PRESSURE: 120 MMHG | BODY MASS INDEX: 23.62 KG/M2

## 2023-02-16 PROCEDURE — 99214 OFFICE O/P EST MOD 30 MIN: CPT

## 2023-02-16 PROCEDURE — 93000 ELECTROCARDIOGRAM COMPLETE: CPT

## 2023-02-17 DIAGNOSIS — R05.9 COUGH, UNSPECIFIED: ICD-10-CM

## 2023-02-19 NOTE — REASON FOR VISIT
[FreeTextEntry1] : \par Tania Naya returns returns today for a follow-up visit.  She was recently hospitalized at [facility] from December 1 through December 10 of 2022 for pancreatitis.  She has a history of hypertension and a mild episode of CHF during her recent hospital stay.

## 2023-02-19 NOTE — PHYSICAL EXAM
[General Appearance - Well Developed] : well developed [Normal Appearance] : normal appearance [Well Groomed] : well groomed [General Appearance - Well Nourished] : well nourished [General Appearance - In No Acute Distress] : no acute distress [Normal Oral Mucosa] : normal oral mucosa [Normal Oropharynx] : normal oropharynx [Normal Jugular Venous A Waves Present] : normal jugular venous A waves present [Normal Jugular Venous V Waves Present] : normal jugular venous V waves present [Respiration, Rhythm And Depth] : normal respiratory rhythm and effort [Auscultation Breath Sounds / Voice Sounds] : lungs were clear to auscultation bilaterally [Heart Rate And Rhythm] : heart rate and rhythm were normal [Bowel Sounds] : normal bowel sounds [Nail Clubbing] : no clubbing of the fingernails [Cyanosis, Localized] : no localized cyanosis [Skin Color & Pigmentation] : normal skin color and pigmentation [] : no rash [Oriented To Time, Place, And Person] : oriented to person, place, and time [Impaired Insight] : insight and judgment were intact [Affect] : the affect was normal [Mood] : the mood was normal [FreeTextEntry1] : Very mild lower extremity varicosities. Trace pedal pulses.

## 2023-02-19 NOTE — DISCUSSION/SUMMARY
[EKG obtained to assist in diagnosis and management of assessed problem(s)] : EKG obtained to assist in diagnosis and management of assessed problem(s) [FreeTextEntry1] : We reassured Tania and her daughter that her physical exam remains stable today.  EKG shows no significant interval change.\par \par She will have her continue on ethacrynic acid 25 mg a day and well as a low salt diet as well.  She will also continue the current dose of metoprolol.\par \par Given her recent episode of pancreatitis, she will also continue on a low fat diet. \par \par For her cough, we have renewed her benzonatate. \par \par She will return for a f/u visit in April.

## 2023-02-19 NOTE — HISTORY OF PRESENT ILLNESS
[FreeTextEntry1] : She was hospitalized at [facility] from December 1 through December 10 of last year for pancreatitis.  During her stay, she had a mild episode of CHF in the setting of IV fluid administration for acute pancreatitis.  She returns today for a follow-up visit.  \par \par During her hospital stay, ethacrynic acid was added; ethacrynic acid was chosen given a history of allergy to sulfa drugs. She has been taking her current medications without difficulty.  She is seen today in the company of her daughter, Erika.  Tania has been feeling better overall. Her only complaint today is an intermittent dry cough.  She describes no episodes of exertional chest discomfort or dyspnea. She reports no orthopnea or PND.  She describes no significant palpitations.

## 2023-04-24 ENCOUNTER — APPOINTMENT (OUTPATIENT)
Dept: CARDIOLOGY | Facility: CLINIC | Age: 88
End: 2023-04-24
Payer: MEDICARE

## 2023-04-24 ENCOUNTER — NON-APPOINTMENT (OUTPATIENT)
Age: 88
End: 2023-04-24

## 2023-04-24 VITALS
DIASTOLIC BLOOD PRESSURE: 60 MMHG | SYSTOLIC BLOOD PRESSURE: 140 MMHG | HEART RATE: 76 BPM | BODY MASS INDEX: 23.62 KG/M2 | WEIGHT: 113 LBS | OXYGEN SATURATION: 95 %

## 2023-04-24 PROCEDURE — 93000 ELECTROCARDIOGRAM COMPLETE: CPT

## 2023-04-24 PROCEDURE — 99214 OFFICE O/P EST MOD 30 MIN: CPT

## 2023-04-27 NOTE — HISTORY OF PRESENT ILLNESS
[FreeTextEntry1] : She was hospitalized at [facility] in December of last year.  She had a mild episode of CHF in the setting of IV fluid administration for acute pancreatitis.  Ethacrynic acid was added; ethacrynic acid was chosen given a history of allergy to sulfa drugs. \par \par She returns today for a follow-up visit in the company of her daughter, Erika.  Taina remains stable overall.  Her only complaint today is a, chronic,  intermittent dry cough.  She describes no episodes of exertional chest discomfort or dyspnea. She reports no orthopnea or PND.  She describes no significant palpitations.\par \par Medications are unchanged. There have been no new interval medical problems.

## 2023-04-27 NOTE — REASON FOR VISIT
[FreeTextEntry1] : \par Tania Person returns returns today for a follow-up visit.   She has a history of hypertension and a mild episode of CHF during a hospital stay in December 2022 for pancreatitis

## 2023-04-27 NOTE — DISCUSSION/SUMMARY
[EKG obtained to assist in diagnosis and management of assessed problem(s)] : EKG obtained to assist in diagnosis and management of assessed problem(s) [FreeTextEntry1] : We reassured Tania and her daughter that her physical exam remains stable today.  EKG shows no significant interval change.\par \par To continue ethacrynic acid 25 mg qd; continue low salt diet as well as current dose of metoprolol.\par \par Given her episode of pancreatitis, she will also continue on a low fat diet. \par \par For her cough, she may try throat lozenges PRN. \par \par She will return for a f/u visit in July.

## 2023-08-01 ENCOUNTER — APPOINTMENT (OUTPATIENT)
Dept: VASCULAR SURGERY | Facility: CLINIC | Age: 88
End: 2023-08-01

## 2023-08-07 RX ORDER — ETHACRYNIC ACID 25 MG/1
25 TABLET ORAL DAILY
Qty: 90 | Refills: 2 | Status: ACTIVE | COMMUNITY
Start: 2023-01-20 | End: 1900-01-01

## 2023-08-28 RX ORDER — BENZONATATE 100 MG/1
100 CAPSULE ORAL EVERY 8 HOURS
Qty: 42 | Refills: 1 | Status: DISCONTINUED | COMMUNITY
Start: 2023-01-20 | End: 2023-08-28

## 2023-08-29 ENCOUNTER — NON-APPOINTMENT (OUTPATIENT)
Age: 88
End: 2023-08-29

## 2023-08-29 ENCOUNTER — APPOINTMENT (OUTPATIENT)
Dept: CARDIOLOGY | Facility: CLINIC | Age: 88
End: 2023-08-29
Payer: MEDICARE

## 2023-08-29 VITALS
RESPIRATION RATE: 17 BRPM | DIASTOLIC BLOOD PRESSURE: 60 MMHG | HEIGHT: 58 IN | SYSTOLIC BLOOD PRESSURE: 136 MMHG | OXYGEN SATURATION: 96 % | HEART RATE: 67 BPM | BODY MASS INDEX: 24.77 KG/M2 | WEIGHT: 118 LBS

## 2023-08-29 DIAGNOSIS — E78.5 HYPERLIPIDEMIA, UNSPECIFIED: ICD-10-CM

## 2023-08-29 PROCEDURE — 93000 ELECTROCARDIOGRAM COMPLETE: CPT

## 2023-08-29 PROCEDURE — 99214 OFFICE O/P EST MOD 30 MIN: CPT

## 2023-08-29 RX ORDER — POTASSIUM CHLORIDE 750 MG/1
10 TABLET, FILM COATED, EXTENDED RELEASE ORAL
Qty: 180 | Refills: 3 | Status: ACTIVE | COMMUNITY
Start: 2023-01-20 | End: 1900-01-01

## 2023-08-29 RX ORDER — POTASSIUM CHLORIDE 750 MG/1
10 TABLET, EXTENDED RELEASE ORAL
Qty: 60 | Refills: 0 | Status: COMPLETED | COMMUNITY
Start: 2023-01-31 | End: 2023-08-29

## 2023-08-29 NOTE — HISTORY OF PRESENT ILLNESS
[FreeTextEntry1] : She was hospitalized at [facility] in December of 2022.  She had a mild episode of CHF in the setting of IV fluid administration for acute pancreatitis.  Ethacrynic acid was added; ethacrynic acid was chosen given a history of allergy to sulfa drugs.   She returns today for a follow-up visit in the company of her daughter, Erika.  She has been stable overall and remains active and largely self-sufficient at home with the help of an aide for several hours each day.  She remains free of cardiac sxs. and reports no episodes of exertional chest discomfort or KEENAN.  There have been no episodes of orthopnea or PND.  She describes no palpitations.  She reports a continuous L shoulder/arm discomfort which is mild and seems musculoskeletal in nature.  Medications are unchanged.

## 2023-08-29 NOTE — DISCUSSION/SUMMARY
[EKG obtained to assist in diagnosis and management of assessed problem(s)] : EKG obtained to assist in diagnosis and management of assessed problem(s) [FreeTextEntry1] : Cardiac exam remains stable with good vital signgs. EKG today shows: Sinus Rhythm at 75 bpm.  Incomplete right bundle branch block with associated ST/T abnormalities; no significant change.  1.  CHF - to continue ethacrynic acid 25 mg qd - continue low salt diet - continue current dose of metoprolol. - renewal for potassium tabs sent to pharmacy.  2.  Given her episode of pancreatitis, she continues on a low fat diet.   No additional cardiac testing at this time.  She will return for a f/u visit in 6 mos.

## 2023-08-29 NOTE — REASON FOR VISIT
[FreeTextEntry1] :  Tania Naya returns for follow-up; she has a history of hypertension and a mild episode of CHF during a hospital stay in December 2022 for pancreatitis

## 2023-08-30 ENCOUNTER — NON-APPOINTMENT (OUTPATIENT)
Age: 88
End: 2023-08-30

## 2023-10-18 NOTE — PHYSICAL THERAPY INITIAL EVALUATION ADULT - GAIT TRAINING, PT EVAL
Goal: Patient will ambulate 50 feet with RW independently in 2 weeks Tazorac Pregnancy And Lactation Text: This medication is not safe during pregnancy. It is unknown if this medication is excreted in breast milk.

## 2023-10-29 ENCOUNTER — RX RENEWAL (OUTPATIENT)
Age: 88
End: 2023-10-29

## 2023-11-25 ENCOUNTER — RX RENEWAL (OUTPATIENT)
Age: 88
End: 2023-11-25

## 2023-11-25 RX ORDER — METOPROLOL SUCCINATE 25 MG/1
25 TABLET, EXTENDED RELEASE ORAL DAILY
Qty: 90 | Refills: 3 | Status: ACTIVE | COMMUNITY
Start: 2023-01-20 | End: 1900-01-01

## 2024-03-06 PROBLEM — I10 HYPERTENSION, BENIGN: Status: ACTIVE | Noted: 2023-01-20

## 2024-03-06 PROBLEM — I50.9 CHF (CONGESTIVE HEART FAILURE): Status: ACTIVE | Noted: 2023-01-29

## 2024-03-07 ENCOUNTER — NON-APPOINTMENT (OUTPATIENT)
Age: 89
End: 2024-03-07

## 2024-03-07 ENCOUNTER — APPOINTMENT (OUTPATIENT)
Dept: CARDIOLOGY | Facility: CLINIC | Age: 89
End: 2024-03-07
Payer: MEDICARE

## 2024-03-07 VITALS
SYSTOLIC BLOOD PRESSURE: 122 MMHG | DIASTOLIC BLOOD PRESSURE: 58 MMHG | HEIGHT: 58 IN | BODY MASS INDEX: 24.56 KG/M2 | RESPIRATION RATE: 17 BRPM | WEIGHT: 117 LBS | OXYGEN SATURATION: 93 % | HEART RATE: 67 BPM

## 2024-03-07 DIAGNOSIS — Z00.00 ENCOUNTER FOR GENERAL ADULT MEDICAL EXAMINATION W/OUT ABNORMAL FINDINGS: ICD-10-CM

## 2024-03-07 DIAGNOSIS — R94.31 ABNORMAL ELECTROCARDIOGRAM [ECG] [EKG]: ICD-10-CM

## 2024-03-07 DIAGNOSIS — I50.9 HEART FAILURE, UNSPECIFIED: ICD-10-CM

## 2024-03-07 DIAGNOSIS — I10 ESSENTIAL (PRIMARY) HYPERTENSION: ICD-10-CM

## 2024-03-07 LAB
ALBUMIN SERPL ELPH-MCNC: 3.9 G/DL
ALP BLD-CCNC: 59 U/L
ALT SERPL-CCNC: 9 U/L
ANION GAP SERPL CALC-SCNC: 12 MMOL/L
AST SERPL-CCNC: 10 U/L
BILIRUB SERPL-MCNC: 0.7 MG/DL
BUN SERPL-MCNC: 30 MG/DL
CALCIUM SERPL-MCNC: 9.8 MG/DL
CHLORIDE SERPL-SCNC: 105 MMOL/L
CHOLEST SERPL-MCNC: 284 MG/DL
CO2 SERPL-SCNC: 24 MMOL/L
CREAT SERPL-MCNC: 1.04 MG/DL
EGFR: 47 ML/MIN/1.73M2
GLUCOSE SERPL-MCNC: 68 MG/DL
HCT VFR BLD CALC: 35.1 %
HDLC SERPL-MCNC: 62 MG/DL
HGB BLD-MCNC: 11.3 G/DL
LDLC SERPL CALC-MCNC: 204 MG/DL
LDLC SERPL DIRECT ASSAY-MCNC: 206 MG/DL
MCHC RBC-ENTMCNC: 30.3 PG
MCHC RBC-ENTMCNC: 32.2 GM/DL
MCV RBC AUTO: 94.1 FL
NONHDLC SERPL-MCNC: 222 MG/DL
NT-PROBNP SERPL-MCNC: 766 PG/ML
PLATELET # BLD AUTO: 152 K/UL
POTASSIUM SERPL-SCNC: 5.1 MMOL/L
PROT SERPL-MCNC: 6.5 G/DL
RBC # BLD: 3.73 M/UL
RBC # FLD: 14.3 %
SODIUM SERPL-SCNC: 141 MMOL/L
TRIGL SERPL-MCNC: 106 MG/DL
WBC # FLD AUTO: 8.08 K/UL

## 2024-03-07 PROCEDURE — 93000 ELECTROCARDIOGRAM COMPLETE: CPT

## 2024-03-07 PROCEDURE — 99214 OFFICE O/P EST MOD 30 MIN: CPT

## 2024-03-07 PROCEDURE — G2211 COMPLEX E/M VISIT ADD ON: CPT

## 2024-03-07 NOTE — HISTORY OF PRESENT ILLNESS
[FreeTextEntry1] : She was hospitalized at [facility] in December of 2022.  She had a mild episode of CHF in the setting of IV fluid administration for acute pancreatitis.  Ethacrynic acid was added; ethacrynic acid was chosen given a history of allergy to sulfa drugs.   She returns today for a follow-up visit in the company of her daughter, Erika.  She continues to do well overall and is still mostly self-sufficient at home, with an aide to help her several hours each day.  She describes no episodes of exertional chest discomfort & no KEENAN.  She reports no episodes of orthopnea or PND; there have been no palpitations.  Her main complaints related to visual loss and deafness but overall she seems to cope quite well.   Medications are unchanged. There have been no new interval medical problems.

## 2024-03-07 NOTE — DISCUSSION/SUMMARY
[FreeTextEntry1] : EKG today shows:  Sinus Rhythm at 76 bpm.  RBBB with associated ST/T abn; no significant change.  PLAN: 1.  CHF - remains well compensated - to continue ethacrynic acid, metoprolol and K supplement. - continue low salt diet  2.  HTN - in good range today - continue current meds.  3.  Mild to mod. MR -murmur unchanged  4.  Given hx. of pancreatitis, she continues a low fat diet.   Will send out blood work today.   She will return for a f/u visit in 6 mos. [EKG obtained to assist in diagnosis and management of assessed problem(s)] : EKG obtained to assist in diagnosis and management of assessed problem(s)

## 2024-03-07 NOTE — PHYSICAL EXAM
[General Appearance - Well Developed] : well developed [Well Groomed] : well groomed [General Appearance - Well Nourished] : well nourished [Normal Appearance] : normal appearance [General Appearance - In No Acute Distress] : no acute distress [Normal Oral Mucosa] : normal oral mucosa [Normal Jugular Venous A Waves Present] : normal jugular venous A waves present [Normal Oropharynx] : normal oropharynx [Normal Jugular Venous V Waves Present] : normal jugular venous V waves present [Respiration, Rhythm And Depth] : normal respiratory rhythm and effort [Auscultation Breath Sounds / Voice Sounds] : lungs were clear to auscultation bilaterally [Heart Rate And Rhythm] : heart rate and rhythm were normal [Bowel Sounds] : normal bowel sounds [Nail Clubbing] : no clubbing of the fingernails [Cyanosis, Localized] : no localized cyanosis [Skin Color & Pigmentation] : normal skin color and pigmentation [] : no rash [Oriented To Time, Place, And Person] : oriented to person, place, and time [Impaired Insight] : insight and judgment were intact [Affect] : the affect was normal [Mood] : the mood was normal [FreeTextEntry1] : Very mild lower extremity varicosities. Trace pedal pulses.

## 2024-04-23 ENCOUNTER — RX RENEWAL (OUTPATIENT)
Age: 89
End: 2024-04-23

## 2024-04-23 RX ORDER — CLOPIDOGREL BISULFATE 75 MG/1
75 TABLET, FILM COATED ORAL
Qty: 90 | Refills: 3 | Status: ACTIVE | COMMUNITY
Start: 2023-04-24 | End: 1900-01-01

## 2024-07-30 ENCOUNTER — RX RENEWAL (OUTPATIENT)
Age: 89
End: 2024-07-30

## 2024-08-27 ENCOUNTER — RX RENEWAL (OUTPATIENT)
Age: 89
End: 2024-08-27

## 2024-09-19 ENCOUNTER — APPOINTMENT (OUTPATIENT)
Dept: CARDIOLOGY | Facility: CLINIC | Age: 89
End: 2024-09-19

## 2024-09-19 DIAGNOSIS — E78.5 HYPERLIPIDEMIA, UNSPECIFIED: ICD-10-CM

## 2024-09-19 DIAGNOSIS — I50.9 HEART FAILURE, UNSPECIFIED: ICD-10-CM

## 2024-09-19 DIAGNOSIS — I10 ESSENTIAL (PRIMARY) HYPERTENSION: ICD-10-CM

## 2024-09-19 DIAGNOSIS — R94.31 ABNORMAL ELECTROCARDIOGRAM [ECG] [EKG]: ICD-10-CM

## 2024-09-22 NOTE — HISTORY OF PRESENT ILLNESS
[FreeTextEntry1] : She was hospitalized at [facility] in December of 2022.  She had a mild episode of CHF in the setting of IV fluid administration for acute pancreatitis.  Ethacrynic acid was added; ethacrynic acid was chosen given a history of allergy to sulfa drugs.   She returns today, in the company of her daughter, Erika,   she continues to do well overall and is still mostly self-sufficient at home, with an aide to help her several hours each day.  She describes no episodes of exertional chest discomfort & no KEENAN.  She reports no episodes of orthopnea or PND; there have been no palpitations.  Her main complaints related to visual loss and deafness but overall she seems to cope quite well.   Medications are unchanged. There have been no new interval medical problems.

## 2024-09-22 NOTE — HISTORY OF PRESENT ILLNESS
[FreeTextEntry1] : She was hospitalized at [facility] in December of 2022.  She had a mild episode of CHF in the setting of IV fluid administration for acute pancreatitis.  Ethacrynic acid was added; ethacrynic acid was chosen given a history of allergy to sulfa drugs.   She returns today, in the company of her daughter, Erika,   she continues to do well overall and is still mostly self-sufficient at home, with an aide to help her several hours each day.  She describes no episodes of exertional chest discomfort & no KEENAN.  She reports no episodes of orthopnea or PND; there have been no palpitations.  Her main complaints related to visual loss and deafness but overall she seems to cope quite well.   Medications are unchanged. There have been no new interval medical problems.

## 2024-09-22 NOTE — DISCUSSION/SUMMARY
[FreeTextEntry1] : EKG today shows:   Sinus Rhythm at 76 bpm.  RBBB with associated ST/T abn; no significant change.  No labs today ?ech onext OV; last 2020  PLAN: 1.  CHF - remains well compensated - to continue ethacrynic acid, metoprolol and K supplement. - continue low salt diet  2.  HTN - in good range today - continue current meds.  3.  Mild to mod. MR; mild AI - murmur unchanged  4.  Given hx. of pancreatitis, she continues a low fat diet.   She will return for a f/u visit in 6 mos.

## 2024-09-22 NOTE — REASON FOR VISIT
[FreeTextEntry1] : NO SHOW FOR APPOINTMENT TODAY.  PRELIMINARY NOTE   Tania Naya returns for follow-up; she has a history of hypertension and a mild episode of CHF during a hospital stay in December 2022 for pancreatitis

## 2024-10-09 NOTE — DISCHARGE NOTE PROVIDER - DISCHARGE DIET
Soft and Bite-Sized Diet
Assistance OOB with selected safe patient handling equipment/Assistance with ambulation/Communicate Fall Risk and Risk Factors to all staff, patient, and family/Discuss with provider need for PT consult/Monitor gait and stability/Provide patient with walking aids - walker, cane, crutches/Reinforce activity limits and safety measures with patient and family/Sit up slowly, dangle for a short time, stand at bedside before walking/Visual Cue: Yellow wristband/Bed in lowest position, wheels locked, appropriate side rails in place/Call bell, personal items and telephone in reach/Instruct patient to call for assistance before getting out of bed or chair/Non-slip footwear when patient is out of bed/[place] to call system/Physically safe environment - no spills, clutter or unnecessary equipment/Purposeful Proactive Rounding/Room/bathroom lighting operational, light cord in reach

## 2024-12-26 ENCOUNTER — INPATIENT (INPATIENT)
Facility: HOSPITAL | Age: 88
LOS: 4 days | Discharge: HOME | End: 2024-12-31
Attending: HOSPITALIST | Admitting: HOSPITALIST
Payer: MEDICARE

## 2024-12-26 VITALS
OXYGEN SATURATION: 94 % | TEMPERATURE: 98 F | RESPIRATION RATE: 18 BRPM | WEIGHT: 110.01 LBS | DIASTOLIC BLOOD PRESSURE: 72 MMHG | SYSTOLIC BLOOD PRESSURE: 175 MMHG | HEART RATE: 79 BPM

## 2024-12-26 DIAGNOSIS — Z96.642 PRESENCE OF LEFT ARTIFICIAL HIP JOINT: Chronic | ICD-10-CM

## 2024-12-26 PROCEDURE — 99285 EMERGENCY DEPT VISIT HI MDM: CPT

## 2024-12-26 RX ORDER — DIPHENHYDRAMINE HCL 25 MG
50 TABLET ORAL ONCE
Refills: 0 | Status: COMPLETED | OUTPATIENT
Start: 2024-12-26 | End: 2024-12-26

## 2024-12-26 RX ORDER — ONDANSETRON 4 MG/1
4 TABLET ORAL ONCE
Refills: 0 | Status: COMPLETED | OUTPATIENT
Start: 2024-12-26 | End: 2024-12-26

## 2024-12-26 RX ORDER — DIPHENHYDRAMINE HCL 25 MG
50 TABLET ORAL ONCE
Refills: 0 | Status: DISCONTINUED | OUTPATIENT
Start: 2024-12-26 | End: 2024-12-26

## 2024-12-26 RX ORDER — ACETAMINOPHEN 80 MG/.8ML
750 SOLUTION/ DROPS ORAL ONCE
Refills: 0 | Status: COMPLETED | OUTPATIENT
Start: 2024-12-26 | End: 2024-12-26

## 2024-12-26 RX ORDER — METHYLPREDNISOLONE 4 MG/1
40 TABLET ORAL ONCE
Refills: 0 | Status: COMPLETED | OUTPATIENT
Start: 2024-12-26 | End: 2024-12-26

## 2024-12-26 RX ADMIN — METHYLPREDNISOLONE 40 MILLIGRAM(S): 4 TABLET ORAL at 23:39

## 2024-12-26 RX ADMIN — ONDANSETRON 4 MILLIGRAM(S): 4 TABLET ORAL at 23:39

## 2024-12-26 RX ADMIN — ACETAMINOPHEN 300 MILLIGRAM(S): 80 SOLUTION/ DROPS ORAL at 23:39

## 2024-12-26 NOTE — ED ADULT TRIAGE NOTE - CHIEF COMPLAINT QUOTE
Pt via EMS from home c/o N/V, abdominal pain since this AM. Well-appearing. PHx: HTN, [place], arthritis, GERD.

## 2024-12-26 NOTE — ED ADULT NURSE NOTE - NSFALLHARMRISKINTERV_ED_ALL_ED
Assistance OOB with selected safe patient handling equipment if applicable/Communicate risk of Fall with Harm to all staff, patient, and family/Monitor gait and stability/Monitor for mental status changes and reorient to person, place, and time, as needed/Move patient closer to nursing station/within visual sight of ED staff/Provide patient with walking aids/Provide visual cue: red socks, yellow wristband, yellow gown, etc/Reinforce activity limits and safety measures with patient and family/Toileting schedule using arm’s reach rule for commode and bathroom/Use of alarms - bed, stretcher, chair and/or video monitoring/Bed in lowest position, wheels locked, appropriate side rails in place/Call bell, personal items and telephone in reach/Instruct patient to call for assistance before getting out of bed/chair/stretcher/Non-slip footwear applied when patient is off stretcher/[place] to call system/Physically safe environment - no spills, clutter or unnecessary equipment/Purposeful Proactive Rounding/Room/bathroom lighting operational, light cord in reach

## 2024-12-26 NOTE — ED ADULT NURSE NOTE - OBJECTIVE STATEMENT
Pt arrives to room 14 A&Ox3 but forgetful and ambulatory with walker at baseline. PH of [place], GERD, Arthritis and Macular degeneration. Pt comes to ED s/p trip and fall. Pt states she got out of bed and went to reach for her walker when she fell over her chair. Pt reports hitting R side of face, denies LOC and blood thinner use. Pt endorsing abdominal pain and nausea at this time. Denies headache, dizziness, chest pain, SOB, fevers, chills and diarrhea at this time. Pt noted to be 93% on room air, pt placed on 2L nasal cannula, 97%. Respirations even and unlabored. 22 gauge placed to R AC, labs drawn and sent. Pt medicated per EMAR. Pt noted with hematoma ro RLQ, tender on palpation. Plan of care ongoing, comfort measures provided and safety measures maintained. Awaiting CT.

## 2024-12-26 NOTE — ED ADULT NURSE NOTE - CHIEF COMPLAINT QUOTE
Pt via EMS from home c/o N/V, abdominal pain since this AM. Well-appearing. PHx: HTN, [place], arthritis, GERD.

## 2024-12-27 DIAGNOSIS — Z29.9 ENCOUNTER FOR PROPHYLACTIC MEASURES, UNSPECIFIED: ICD-10-CM

## 2024-12-27 DIAGNOSIS — I10 ESSENTIAL (PRIMARY) HYPERTENSION: ICD-10-CM

## 2024-12-27 DIAGNOSIS — I50.32 CHRONIC DIASTOLIC (CONGESTIVE) HEART FAILURE: ICD-10-CM

## 2024-12-27 DIAGNOSIS — M79.604 PAIN IN RIGHT LEG: ICD-10-CM

## 2024-12-27 LAB
ADD ON TEST-SPECIMEN IN LAB: SIGNIFICANT CHANGE UP
ALBUMIN SERPL ELPH-MCNC: 3.7 G/DL — SIGNIFICANT CHANGE UP (ref 3.3–5)
ALP SERPL-CCNC: 61 U/L — SIGNIFICANT CHANGE UP (ref 40–120)
ALT FLD-CCNC: 7 U/L — SIGNIFICANT CHANGE UP (ref 4–33)
ANION GAP SERPL CALC-SCNC: 14 MMOL/L — SIGNIFICANT CHANGE UP (ref 7–14)
APTT BLD: 27.7 SEC — SIGNIFICANT CHANGE UP (ref 24.5–35.6)
AST SERPL-CCNC: 21 U/L — SIGNIFICANT CHANGE UP (ref 4–32)
BASOPHILS # BLD AUTO: 0.03 K/UL — SIGNIFICANT CHANGE UP (ref 0–0.2)
BASOPHILS NFR BLD AUTO: 0.2 % — SIGNIFICANT CHANGE UP (ref 0–2)
BILIRUB SERPL-MCNC: 1.2 MG/DL — SIGNIFICANT CHANGE UP (ref 0.2–1.2)
BUN SERPL-MCNC: 21 MG/DL — SIGNIFICANT CHANGE UP (ref 7–23)
CALCIUM SERPL-MCNC: 9.2 MG/DL — SIGNIFICANT CHANGE UP (ref 8.4–10.5)
CHLORIDE SERPL-SCNC: 101 MMOL/L — SIGNIFICANT CHANGE UP (ref 98–107)
CO2 SERPL-SCNC: 20 MMOL/L — LOW (ref 22–31)
CREAT SERPL-MCNC: 0.8 MG/DL — SIGNIFICANT CHANGE UP (ref 0.5–1.3)
EGFR: 65 ML/MIN/1.73M2 — SIGNIFICANT CHANGE UP
EOSINOPHIL # BLD AUTO: 0.13 K/UL — SIGNIFICANT CHANGE UP (ref 0–0.5)
EOSINOPHIL NFR BLD AUTO: 1 % — SIGNIFICANT CHANGE UP (ref 0–6)
GLUCOSE SERPL-MCNC: 94 MG/DL — SIGNIFICANT CHANGE UP (ref 70–99)
HCT VFR BLD CALC: 33.7 % — LOW (ref 34.5–45)
HGB BLD-MCNC: 11 G/DL — LOW (ref 11.5–15.5)
IANC: 11.08 K/UL — HIGH (ref 1.8–7.4)
IMM GRANULOCYTES NFR BLD AUTO: 0.7 % — SIGNIFICANT CHANGE UP (ref 0–0.9)
INR BLD: 1.03 RATIO — SIGNIFICANT CHANGE UP (ref 0.85–1.16)
LIDOCAIN IGE QN: >3000 U/L — HIGH (ref 7–60)
LYMPHOCYTES # BLD AUTO: 0.85 K/UL — LOW (ref 1–3.3)
LYMPHOCYTES # BLD AUTO: 6.7 % — LOW (ref 13–44)
MCHC RBC-ENTMCNC: 30 PG — SIGNIFICANT CHANGE UP (ref 27–34)
MCHC RBC-ENTMCNC: 32.6 G/DL — SIGNIFICANT CHANGE UP (ref 32–36)
MCV RBC AUTO: 91.8 FL — SIGNIFICANT CHANGE UP (ref 80–100)
MONOCYTES # BLD AUTO: 0.6 K/UL — SIGNIFICANT CHANGE UP (ref 0–0.9)
MONOCYTES NFR BLD AUTO: 4.7 % — SIGNIFICANT CHANGE UP (ref 2–14)
NEUTROPHILS # BLD AUTO: 11.08 K/UL — HIGH (ref 1.8–7.4)
NEUTROPHILS NFR BLD AUTO: 86.7 % — HIGH (ref 43–77)
NRBC # BLD: 0 /100 WBCS — SIGNIFICANT CHANGE UP (ref 0–0)
NRBC # FLD: 0 K/UL — SIGNIFICANT CHANGE UP (ref 0–0)
PLATELET # BLD AUTO: 133 K/UL — LOW (ref 150–400)
POTASSIUM SERPL-MCNC: 4.3 MMOL/L — SIGNIFICANT CHANGE UP (ref 3.5–5.3)
POTASSIUM SERPL-SCNC: 4.3 MMOL/L — SIGNIFICANT CHANGE UP (ref 3.5–5.3)
PROT SERPL-MCNC: 7.1 G/DL — SIGNIFICANT CHANGE UP (ref 6–8.3)
PROTHROM AB SERPL-ACNC: 12 SEC — SIGNIFICANT CHANGE UP (ref 9.9–13.4)
RBC # BLD: 3.67 M/UL — LOW (ref 3.8–5.2)
RBC # FLD: 14.3 % — SIGNIFICANT CHANGE UP (ref 10.3–14.5)
SODIUM SERPL-SCNC: 135 MMOL/L — SIGNIFICANT CHANGE UP (ref 135–145)
WBC # BLD: 12.78 K/UL — HIGH (ref 3.8–10.5)
WBC # FLD AUTO: 12.78 K/UL — HIGH (ref 3.8–10.5)

## 2024-12-27 PROCEDURE — 74177 CT ABD & PELVIS W/CONTRAST: CPT | Mod: 26,MC

## 2024-12-27 PROCEDURE — 99223 1ST HOSP IP/OBS HIGH 75: CPT

## 2024-12-27 PROCEDURE — 71260 CT THORAX DX C+: CPT | Mod: 26,MC

## 2024-12-27 PROCEDURE — 72125 CT NECK SPINE W/O DYE: CPT | Mod: 26,MC

## 2024-12-27 PROCEDURE — 70450 CT HEAD/BRAIN W/O DYE: CPT | Mod: 26,MC

## 2024-12-27 RX ORDER — GINKGO BILOBA 40 MG
3 CAPSULE ORAL AT BEDTIME
Refills: 0 | Status: DISCONTINUED | OUTPATIENT
Start: 2024-12-27 | End: 2024-12-31

## 2024-12-27 RX ORDER — ONDANSETRON 4 MG/1
4 TABLET ORAL EVERY 8 HOURS
Refills: 0 | Status: DISCONTINUED | OUTPATIENT
Start: 2024-12-27 | End: 2024-12-31

## 2024-12-27 RX ORDER — SODIUM CHLORIDE 9 MG/ML
1000 INJECTION, SOLUTION INTRAMUSCULAR; INTRAVENOUS; SUBCUTANEOUS
Refills: 0 | Status: DISCONTINUED | OUTPATIENT
Start: 2024-12-27 | End: 2024-12-27

## 2024-12-27 RX ORDER — SODIUM CHLORIDE 9 MG/ML
1000 INJECTION, SOLUTION INTRAVENOUS
Refills: 0 | Status: DISCONTINUED | OUTPATIENT
Start: 2024-12-27 | End: 2024-12-28

## 2024-12-27 RX ORDER — ACETAMINOPHEN 80 MG/.8ML
650 SOLUTION/ DROPS ORAL EVERY 6 HOURS
Refills: 0 | Status: DISCONTINUED | OUTPATIENT
Start: 2024-12-27 | End: 2024-12-29

## 2024-12-27 RX ORDER — BENZOCAINE AND MENTHOL 15; 3.6 MG/1; MG/1
1 LOZENGE ORAL ONCE
Refills: 0 | Status: COMPLETED | OUTPATIENT
Start: 2024-12-27 | End: 2024-12-27

## 2024-12-27 RX ORDER — MAG HYDROX/ALUMINUM HYD/SIMETH 200-200-20
30 SUSPENSION, ORAL (FINAL DOSE FORM) ORAL EVERY 4 HOURS
Refills: 0 | Status: DISCONTINUED | OUTPATIENT
Start: 2024-12-27 | End: 2024-12-31

## 2024-12-27 RX ORDER — SODIUM CHLORIDE 9 MG/ML
1000 INJECTION, SOLUTION INTRAMUSCULAR; INTRAVENOUS; SUBCUTANEOUS ONCE
Refills: 0 | Status: COMPLETED | OUTPATIENT
Start: 2024-12-27 | End: 2024-12-27

## 2024-12-27 RX ORDER — ACETAMINOPHEN 80 MG/.8ML
750 SOLUTION/ DROPS ORAL ONCE
Refills: 0 | Status: COMPLETED | OUTPATIENT
Start: 2024-12-27 | End: 2024-12-27

## 2024-12-27 RX ADMIN — ACETAMINOPHEN 300 MILLIGRAM(S): 80 SOLUTION/ DROPS ORAL at 05:44

## 2024-12-27 RX ADMIN — ACETAMINOPHEN 650 MILLIGRAM(S): 80 SOLUTION/ DROPS ORAL at 14:05

## 2024-12-27 RX ADMIN — SODIUM CHLORIDE 1000 MILLILITER(S): 9 INJECTION, SOLUTION INTRAMUSCULAR; INTRAVENOUS; SUBCUTANEOUS at 03:01

## 2024-12-27 RX ADMIN — SODIUM CHLORIDE 75 MILLILITER(S): 9 INJECTION, SOLUTION INTRAVENOUS at 10:59

## 2024-12-27 RX ADMIN — SODIUM CHLORIDE 90 MILLILITER(S): 9 INJECTION, SOLUTION INTRAMUSCULAR; INTRAVENOUS; SUBCUTANEOUS at 06:59

## 2024-12-27 RX ADMIN — ACETAMINOPHEN 650 MILLIGRAM(S): 80 SOLUTION/ DROPS ORAL at 21:23

## 2024-12-27 RX ADMIN — ACETAMINOPHEN 650 MILLIGRAM(S): 80 SOLUTION/ DROPS ORAL at 22:30

## 2024-12-27 RX ADMIN — BENZOCAINE AND MENTHOL 1 LOZENGE: 15; 3.6 LOZENGE ORAL at 14:07

## 2024-12-27 RX ADMIN — ACETAMINOPHEN 650 MILLIGRAM(S): 80 SOLUTION/ DROPS ORAL at 15:07

## 2024-12-27 RX ADMIN — Medication 50 MILLIGRAM(S): at 03:39

## 2024-12-27 NOTE — H&P ADULT - ASSESSMENT
89F with HTN, HFpEF, macular degeneration, pancreatitis p/w acute pancreatitis and RLE pain 2/2 fall

## 2024-12-27 NOTE — H&P ADULT - PROBLEM SELECTOR PLAN 1
Pt with peripancreatic fluid and lipase >3000. Report of abd pain and vomiting yesterday which is now resolved  - continue gentle IVF given history of HFpEF  - increase diet as tolerated  - pain control prn  - pt follows closely with outpatient GI, would recommend follow up upon d/c

## 2024-12-27 NOTE — ED ADULT NURSE REASSESSMENT NOTE - NS ED NURSE REASSESS COMMENT FT1
Upon reassessment pt sleeping in stretcher arousable to verbal and tactile and stimuli. Respirations even and unlabored on 2L nasal cannula. VS as noted in flow sheet. Pt pre medicated for CT at this time. CT aware for CT at 0440. No acute distress noted. Plan of care ongoing, comfort measures provided and safety measures maintained. Awaiting CT.

## 2024-12-27 NOTE — H&P ADULT - NSHPLABSRESULTS_GEN_ALL_CORE
11.0   12.78 )-----------( 133      ( 26 Dec 2024 23:22 )             33.7     12-26    135  |  101  |  21  ----------------------------<  94  4.3   |  20[L]  |  0.80    Ca    9.2      26 Dec 2024 23:22    TPro  7.1  /  Alb  3.7  /  TBili  1.2  /  DBili  x   /  AST  21  /  ALT  7   /  AlkPhos  61  12-26    PT/INR - ( 26 Dec 2024 23:22 )   PT: 12.0 sec;   INR: 1.03 ratio         PTT - ( 26 Dec 2024 23:22 )  PTT:27.7 sec        CAPILLARY BLOOD GLUCOSE          Urinalysis Basic - ( 26 Dec 2024 23:22 )    Color: x / Appearance: x / SG: x / pH: x  Gluc: 94 mg/dL / Ketone: x  / Bili: x / Urobili: x   Blood: x / Protein: x / Nitrite: x   Leuk Esterase: x / RBC: x / WBC x   Sq Epi: x / Non Sq Epi: x / Bacteria: x      CT C/A/P: personally reviewed IMPRESSION:  1.  No evidence for acutethoracic trauma.    2.  Mild peripancreatic fluid. Lipase level correlation is recommended to   evaluate for acute pancreatitis. Normal pancreatic enhancement.    3.  Thickened endometrium for which ultrasound correlation and GYN   peripheral are advised.    CT Head/Neck: CT HEAD: Slightly motion degradation.  No acute intracranial hemorrhage, mass effect, or midline shift.    CT CERVICAL SPINE:  No acute fracture or traumatic subluxation.    Multi-level degenerative changes.      EKG: Sinus, HR 82, QTc 462, incomplete RBBB, no ischemic changes, no changes from 12/2022

## 2024-12-27 NOTE — H&P ADULT - PROBLEM SELECTOR PLAN 2
Pt with RLE pain s/p fall  - PT consulted  - check Xray hip, femur and knee to eval for fracture  - pain control prn

## 2024-12-27 NOTE — H&P ADULT - CONVERSATION DETAILS
Discussed with pt's Son, Geo, who notes that pt has completed MOLST form from last admission which has not changed. Pt is DNR/DNI, no feeding tube, trial of IVF and antibiotics as indicated. DNR/DNI order placed and MOLST printed and placed in paper chart.

## 2024-12-27 NOTE — H&P ADULT - HISTORY OF PRESENT ILLNESS
89F with HTN, HFpEF, macular degeneration, pancreatitis p/w nausea and vomiting s/p fall. Pt notes that 2 days ago she fell after  89F with HTN, HFpEF, macular degeneration, pancreatitis p/w nausea and vomiting s/p fall. Pt notes that 2 days ago she fell after attempting to get out of bed, she reached for her walker and felt her right give out. She slowly fell onto the floor and hit the right side of her face and leg. Denies LOC. Family noted a bruise on the right side of her abdomen which occurred after the fall. Pt did not want to go to the ED 2 days ago, but yesterday developed worsening sharp, 5/10, right leg pain, worse with movement. Pt also had several episodes of nbnb emesis and abdominal pain yesterday. Denies fevers, chills, chest pain, SOB, abd pain, n/v/d.    In ED VSS/ pt s/p 1L NS, tylenol, zofran and steroid/benadryl for CT premedication

## 2024-12-27 NOTE — ED ADULT NURSE REASSESSMENT NOTE - NS ED NURSE REASSESS COMMENT FT1
Pt returns from CT. Pt A&Ox3 sitting up in stretcher resting comfortably. Respirations even and unlabored on 2L nasal cannula. No acute distress noted. Denies headache, dizziness, chest pain, SOB, pruritis, fevers, chills, nausea pr vomiting at this time. Pt endorsing pain to R leg at this time. MD Fink made aware. Plan of care ongoing, comfort measures provided and safety measures maintained. Awaiting further orders.

## 2024-12-27 NOTE — ED PROVIDER NOTE - ATTENDING CONTRIBUTION TO CARE
Brief HPI:  89-year-old female past medical history of hypertension, macular degeneration, hard of hearing, pancreatitis presents to the ED after fall 2 days ago.  Patient sustained a fall while getting out of bed.  She reports injury to left head, abdominal and pelvis pain.    Vitals:   Reviewed    Exam:    GEN:  Non-toxic appearing, non-distressed, speaking full sentences, non-diaphoretic, Alert  HEENT:  NCAT, neck supple, EOMI, PERRLA, sclera anicteric, no conjunctival pallor or injection, no stridor, normal voice, no tonsillar exudate, uvula midline  CV:  regular rhythm and rate, s1/s2 audible, no murmurs, rubs or gallops, peripheral pulses 2+ and symmetric  PULM:  non-labored respirations, lungs clear to auscultation bilaterally, no wheezes, crackles or rales  ABD:  non distended, non-tender, no rebound, no guarding, negative Cruz's sign, bowel sounds normal, no cvat  MSK:  no gross deformity, non-tender extremities and joints, range of motion grossly normal appearing, no extremity edema, extremities warm and well perfused   NEURO:  AAOx3, CN II-XII intact, motor 5/5 in upper and lower extremities bilaterally, sensation grossly intact in extremities and trunk  SKIN:  Ecchymosis to right lower quadrant abdomen    A/P: 89-year-old female past medical history of hypertension, macular degeneration, hard of hearing, pancreatitis presents to the ED after fall 2 days ago. Vital signs stable.  Right lower quadrant abdominal ecchymosis.  Will evaluate injuries with labs, CT imaging.  Disposition pending.

## 2024-12-27 NOTE — CHART NOTE - NSCHARTNOTEFT_GEN_A_CORE
[facility] Medicine Night Coverage     Notified by RN, no physical MOLST in chart. Attempted to get in touch with patient's son, Geo Tyson at [phone] to complete MOLST, unable to do so.  [] Will relay to AM provider to re-attempt in AM.       Karely Ray PA-C  Department of Medicine   [id number]

## 2024-12-27 NOTE — H&P ADULT - PROBLEM SELECTOR PLAN 5
DVT ppx: low risk, SCDs  Diet: regular  Dispo: pending PT eval    plan d/w Son, Geo, over the phone 12/27

## 2024-12-27 NOTE — H&P ADULT - NSHPREVIEWOFSYSTEMS_GEN_ALL_CORE
GEN: No fever, chills, night sweats, weight loss  EYES: No vision changes, irritation, itchiness  ENT: No ear pain, congestion, sore throat  RESP: No cough or trouble breathing  CARDIOVASCULAR: No chest pain or palpitations  GI: +abd pain, vomiting  :  No change in urine output; no dysuria, hematuria, or discharge  MSK: +RLE pain  SKIN: No rashes  NEURO: No headache; no abnormal movements; no numbness or tingling  Remainder negative, except as per the HPI

## 2024-12-27 NOTE — ED ADULT NURSE REASSESSMENT NOTE - NS ED NURSE REASSESS COMMENT FT1
A&Ox4 but forgetful, no signs of distress, no complaints at this time, pending bed placement, fall precautions maintained

## 2024-12-27 NOTE — ED PROVIDER NOTE - CLINICAL SUMMARY MEDICAL DECISION MAKING FREE TEXT BOX
89-year-old female past medical history significant for hypertension, macular degeneration, pancreatitis presents emergency department status post mechanical fall x 2 days ago presenting with episode of vomiting, abdominal pain.  Patient tolerating p.o., not on blood thinners or aspirin.  Patient complaining of abdominal pain.  No systemic signs or symptoms.  Seen evaluated at bedside with daughter-in-law who collaborates story.  Physical exam, patient ANO x 4, lungs clear to auscultation, cardiac regular rate and rhythm, abdomen tender with ecchymosis to right lower quadrant, pelvis intact, no midline spinal tenderness, no chest wall tenderness, range of motion of upper and lower extremity.  Will get CT head to evaluate intracranial hemorrhage, will get CT chest, abdomen to evaluate for acute trauma pathology, will get screening labs, pain medicine.  Dispo pending workup

## 2024-12-27 NOTE — PATIENT PROFILE ADULT - FALL HARM RISK - HARM RISK INTERVENTIONS

## 2024-12-27 NOTE — H&P ADULT - NSHPPHYSICALEXAM_GEN_ALL_CORE
T(C): 36.8 (12-27-24 @ 12:25), Max: 37.1 (12-27-24 @ 08:39)  HR: 72 (12-27-24 @ 12:25) (69 - 90)  BP: 144/48 (12-27-24 @ 12:25) (143/63 - 176/73)  RR: 16 (12-27-24 @ 12:25) (16 - 18)  SpO2: 95% (12-27-24 @ 12:25) (90% - 97%)    CONSTITUTIONAL: Well groomed, no apparent distress  EYES: PERRLA and symmetric, EOMI, No conjunctival or scleral injection, non-icteric  ENMT: Oral mucosa with moist membranes. Normal dentition; no pharyngeal injection or exudates  NECK: Supple, symmetric and without tracheal deviation   RESP: No respiratory distress, no use of accessory muscles; CTA b/l, no WRR  CV: RRR, +S1S2, no MRG; no JVD; no peripheral edema  GI: Soft, ND, mild epigastric tenderness, no rebound, no guarding; no palpable masses; no hepatosplenomegaly; no hernia palpated  Skin: warm and dry, 4cm bruise RLQ  MSK: No digital clubbing or cyanosis; examination of the (head/neck/spine/ribs/pelvis, RUE, LUE, RLE, LLE) without misalignment,            Normal ROM without pain, no spinal tenderness, normal muscle strength/tone  NEURO: CN II-XII intact; no focal motor deficits, sensation intact in upper and lower extremities b/l to light touch   PSYCH: Appropriate insight/judgment; A+O x 3, mood and affect appropriate, recent/remote memory intact

## 2024-12-27 NOTE — H&P ADULT - NSICDXPASTMEDICALHX_GEN_ALL_CORE_FT
PAST MEDICAL HISTORY:  Arthritis     GERD (gastroesophageal reflux disease)     Hard of hearing, bilateral     Macular degeneration     Visual impairment      Humira Counseling:  I discussed with the patient the risks of adalimumab including but not limited to myelosuppression, immunosuppression, autoimmune hepatitis, demyelinating diseases, lymphoma, and serious infections.  The patient understands that monitoring is required including a PPD at baseline and must alert us or the primary physician if symptoms of infection or other concerning signs are noted.

## 2024-12-28 LAB
ALBUMIN SERPL ELPH-MCNC: 3.4 G/DL — SIGNIFICANT CHANGE UP (ref 3.3–5)
ALP SERPL-CCNC: 52 U/L — SIGNIFICANT CHANGE UP (ref 40–120)
ALT FLD-CCNC: 10 U/L — SIGNIFICANT CHANGE UP (ref 4–33)
AMYLASE P1 CFR SERPL: 680 U/L — HIGH (ref 25–125)
ANION GAP SERPL CALC-SCNC: 12 MMOL/L — SIGNIFICANT CHANGE UP (ref 7–14)
AST SERPL-CCNC: 23 U/L — SIGNIFICANT CHANGE UP (ref 4–32)
BASOPHILS # BLD AUTO: 0.02 K/UL — SIGNIFICANT CHANGE UP (ref 0–0.2)
BASOPHILS NFR BLD AUTO: 0.2 % — SIGNIFICANT CHANGE UP (ref 0–2)
BILIRUB SERPL-MCNC: 0.9 MG/DL — SIGNIFICANT CHANGE UP (ref 0.2–1.2)
BUN SERPL-MCNC: 24 MG/DL — HIGH (ref 7–23)
CALCIUM SERPL-MCNC: 8.8 MG/DL — SIGNIFICANT CHANGE UP (ref 8.4–10.5)
CHLORIDE SERPL-SCNC: 103 MMOL/L — SIGNIFICANT CHANGE UP (ref 98–107)
CO2 SERPL-SCNC: 22 MMOL/L — SIGNIFICANT CHANGE UP (ref 22–31)
CREAT SERPL-MCNC: 0.94 MG/DL — SIGNIFICANT CHANGE UP (ref 0.5–1.3)
EGFR: 53 ML/MIN/1.73M2 — LOW
EOSINOPHIL # BLD AUTO: 0.17 K/UL — SIGNIFICANT CHANGE UP (ref 0–0.5)
EOSINOPHIL NFR BLD AUTO: 1.3 % — SIGNIFICANT CHANGE UP (ref 0–6)
GLUCOSE SERPL-MCNC: 82 MG/DL — SIGNIFICANT CHANGE UP (ref 70–99)
HCT VFR BLD CALC: 30.9 % — LOW (ref 34.5–45)
HGB BLD-MCNC: 10.3 G/DL — LOW (ref 11.5–15.5)
IANC: 11.06 K/UL — HIGH (ref 1.8–7.4)
IMM GRANULOCYTES NFR BLD AUTO: 0.7 % — SIGNIFICANT CHANGE UP (ref 0–0.9)
LIDOCAIN IGE QN: 278 U/L — HIGH (ref 7–60)
LYMPHOCYTES # BLD AUTO: 0.89 K/UL — LOW (ref 1–3.3)
LYMPHOCYTES # BLD AUTO: 6.9 % — LOW (ref 13–44)
MAGNESIUM SERPL-MCNC: 2 MG/DL — SIGNIFICANT CHANGE UP (ref 1.6–2.6)
MCHC RBC-ENTMCNC: 30.7 PG — SIGNIFICANT CHANGE UP (ref 27–34)
MCHC RBC-ENTMCNC: 33.3 G/DL — SIGNIFICANT CHANGE UP (ref 32–36)
MCV RBC AUTO: 92.2 FL — SIGNIFICANT CHANGE UP (ref 80–100)
MONOCYTES # BLD AUTO: 0.64 K/UL — SIGNIFICANT CHANGE UP (ref 0–0.9)
MONOCYTES NFR BLD AUTO: 5 % — SIGNIFICANT CHANGE UP (ref 2–14)
NEUTROPHILS # BLD AUTO: 11.06 K/UL — HIGH (ref 1.8–7.4)
NEUTROPHILS NFR BLD AUTO: 85.9 % — HIGH (ref 43–77)
NRBC # BLD: 0 /100 WBCS — SIGNIFICANT CHANGE UP (ref 0–0)
NRBC # FLD: 0 K/UL — SIGNIFICANT CHANGE UP (ref 0–0)
PHOSPHATE SERPL-MCNC: 2.2 MG/DL — LOW (ref 2.5–4.5)
PLATELET # BLD AUTO: 124 K/UL — LOW (ref 150–400)
POTASSIUM SERPL-MCNC: 4 MMOL/L — SIGNIFICANT CHANGE UP (ref 3.5–5.3)
POTASSIUM SERPL-SCNC: 4 MMOL/L — SIGNIFICANT CHANGE UP (ref 3.5–5.3)
PROT SERPL-MCNC: 6.5 G/DL — SIGNIFICANT CHANGE UP (ref 6–8.3)
RBC # BLD: 3.35 M/UL — LOW (ref 3.8–5.2)
RBC # FLD: 14.4 % — SIGNIFICANT CHANGE UP (ref 10.3–14.5)
SODIUM SERPL-SCNC: 137 MMOL/L — SIGNIFICANT CHANGE UP (ref 135–145)
WBC # BLD: 12.87 K/UL — HIGH (ref 3.8–10.5)
WBC # FLD AUTO: 12.87 K/UL — HIGH (ref 3.8–10.5)

## 2024-12-28 PROCEDURE — 73501 X-RAY EXAM HIP UNI 1 VIEW: CPT | Mod: 26,RT

## 2024-12-28 PROCEDURE — 99232 SBSQ HOSP IP/OBS MODERATE 35: CPT

## 2024-12-28 PROCEDURE — 73552 X-RAY EXAM OF FEMUR 2/>: CPT | Mod: 26,RT

## 2024-12-28 PROCEDURE — 73562 X-RAY EXAM OF KNEE 3: CPT | Mod: 26,RT

## 2024-12-28 RX ORDER — SODIUM CHLORIDE 9 MG/ML
1000 INJECTION, SOLUTION INTRAVENOUS
Refills: 0 | Status: DISCONTINUED | OUTPATIENT
Start: 2024-12-28 | End: 2024-12-28

## 2024-12-28 RX ORDER — HEPARIN SODIUM 1000 [USP'U]/ML
5000 INJECTION, SOLUTION INTRAVENOUS; SUBCUTANEOUS EVERY 12 HOURS
Refills: 0 | Status: DISCONTINUED | OUTPATIENT
Start: 2024-12-28 | End: 2024-12-31

## 2024-12-28 RX ORDER — METOPROLOL TARTRATE 50 MG
25 TABLET ORAL DAILY
Refills: 0 | Status: DISCONTINUED | OUTPATIENT
Start: 2024-12-28 | End: 2024-12-31

## 2024-12-28 RX ORDER — POLYSORBATE 80 100 MG/10ML
1 SOLUTION/ DROPS OPHTHALMIC THREE TIMES A DAY
Refills: 0 | Status: DISCONTINUED | OUTPATIENT
Start: 2024-12-28 | End: 2024-12-31

## 2024-12-28 RX ORDER — HEPARIN SODIUM 1000 [USP'U]/ML
5000 INJECTION, SOLUTION INTRAVENOUS; SUBCUTANEOUS EVERY 8 HOURS
Refills: 0 | Status: DISCONTINUED | OUTPATIENT
Start: 2024-12-28 | End: 2024-12-28

## 2024-12-28 RX ADMIN — ACETAMINOPHEN 650 MILLIGRAM(S): 80 SOLUTION/ DROPS ORAL at 05:43

## 2024-12-28 RX ADMIN — ACETAMINOPHEN 650 MILLIGRAM(S): 80 SOLUTION/ DROPS ORAL at 15:11

## 2024-12-28 RX ADMIN — ACETAMINOPHEN 650 MILLIGRAM(S): 80 SOLUTION/ DROPS ORAL at 22:31

## 2024-12-28 RX ADMIN — HEPARIN SODIUM 5000 UNIT(S): 1000 INJECTION, SOLUTION INTRAVENOUS; SUBCUTANEOUS at 17:40

## 2024-12-28 RX ADMIN — ACETAMINOPHEN 650 MILLIGRAM(S): 80 SOLUTION/ DROPS ORAL at 06:45

## 2024-12-28 RX ADMIN — POLYSORBATE 80 1 DROP(S): 100 SOLUTION/ DROPS OPHTHALMIC at 14:00

## 2024-12-28 RX ADMIN — ONDANSETRON 4 MILLIGRAM(S): 4 TABLET ORAL at 17:40

## 2024-12-28 RX ADMIN — ACETAMINOPHEN 650 MILLIGRAM(S): 80 SOLUTION/ DROPS ORAL at 14:11

## 2024-12-28 RX ADMIN — SODIUM CHLORIDE 75 MILLILITER(S): 9 INJECTION, SOLUTION INTRAVENOUS at 10:10

## 2024-12-28 RX ADMIN — ACETAMINOPHEN 650 MILLIGRAM(S): 80 SOLUTION/ DROPS ORAL at 23:29

## 2024-12-28 NOTE — PROGRESS NOTE ADULT - PROBLEM SELECTOR PLAN 5
DVT ppx: HSQ  Diet: regular  Dispo: pending PT eval DVT ppx: HSQ  Diet: regular  Dispo: pending PT eval  Code: DNR/DNI (molst done on 12/28)

## 2024-12-29 LAB
ALBUMIN SERPL ELPH-MCNC: 3.1 G/DL — LOW (ref 3.3–5)
ALP SERPL-CCNC: 53 U/L — SIGNIFICANT CHANGE UP (ref 40–120)
ALT FLD-CCNC: 11 U/L — SIGNIFICANT CHANGE UP (ref 4–33)
ANION GAP SERPL CALC-SCNC: 14 MMOL/L — SIGNIFICANT CHANGE UP (ref 7–14)
AST SERPL-CCNC: 21 U/L — SIGNIFICANT CHANGE UP (ref 4–32)
BASOPHILS # BLD AUTO: 0.03 K/UL — SIGNIFICANT CHANGE UP (ref 0–0.2)
BASOPHILS NFR BLD AUTO: 0.2 % — SIGNIFICANT CHANGE UP (ref 0–2)
BILIRUB DIRECT SERPL-MCNC: <0.2 MG/DL — SIGNIFICANT CHANGE UP (ref 0–0.3)
BILIRUB INDIRECT FLD-MCNC: >0.7 MG/DL — SIGNIFICANT CHANGE UP (ref 0–1)
BILIRUB SERPL-MCNC: 0.9 MG/DL — SIGNIFICANT CHANGE UP (ref 0.2–1.2)
BUN SERPL-MCNC: 19 MG/DL — SIGNIFICANT CHANGE UP (ref 7–23)
CALCIUM SERPL-MCNC: 8.6 MG/DL — SIGNIFICANT CHANGE UP (ref 8.4–10.5)
CHLORIDE SERPL-SCNC: 101 MMOL/L — SIGNIFICANT CHANGE UP (ref 98–107)
CO2 SERPL-SCNC: 19 MMOL/L — LOW (ref 22–31)
CREAT SERPL-MCNC: 0.94 MG/DL — SIGNIFICANT CHANGE UP (ref 0.5–1.3)
EGFR: 53 ML/MIN/1.73M2 — LOW
EOSINOPHIL # BLD AUTO: 0.15 K/UL — SIGNIFICANT CHANGE UP (ref 0–0.5)
EOSINOPHIL NFR BLD AUTO: 1.2 % — SIGNIFICANT CHANGE UP (ref 0–6)
GLUCOSE SERPL-MCNC: 79 MG/DL — SIGNIFICANT CHANGE UP (ref 70–99)
HCT VFR BLD CALC: 29 % — LOW (ref 34.5–45)
HGB BLD-MCNC: 9.7 G/DL — LOW (ref 11.5–15.5)
IANC: 9.94 K/UL — HIGH (ref 1.8–7.4)
IMM GRANULOCYTES NFR BLD AUTO: 1.1 % — HIGH (ref 0–0.9)
LYMPHOCYTES # BLD AUTO: 1.28 K/UL — SIGNIFICANT CHANGE UP (ref 1–3.3)
LYMPHOCYTES # BLD AUTO: 10.4 % — LOW (ref 13–44)
MAGNESIUM SERPL-MCNC: 1.9 MG/DL — SIGNIFICANT CHANGE UP (ref 1.6–2.6)
MCHC RBC-ENTMCNC: 30.5 PG — SIGNIFICANT CHANGE UP (ref 27–34)
MCHC RBC-ENTMCNC: 33.4 G/DL — SIGNIFICANT CHANGE UP (ref 32–36)
MCV RBC AUTO: 91.2 FL — SIGNIFICANT CHANGE UP (ref 80–100)
MONOCYTES # BLD AUTO: 0.72 K/UL — SIGNIFICANT CHANGE UP (ref 0–0.9)
MONOCYTES NFR BLD AUTO: 5.9 % — SIGNIFICANT CHANGE UP (ref 2–14)
NEUTROPHILS # BLD AUTO: 9.94 K/UL — HIGH (ref 1.8–7.4)
NEUTROPHILS NFR BLD AUTO: 81.2 % — HIGH (ref 43–77)
NRBC # BLD: 0 /100 WBCS — SIGNIFICANT CHANGE UP (ref 0–0)
NRBC # FLD: 0 K/UL — SIGNIFICANT CHANGE UP (ref 0–0)
PHOSPHATE SERPL-MCNC: 2.2 MG/DL — LOW (ref 2.5–4.5)
PLATELET # BLD AUTO: 130 K/UL — LOW (ref 150–400)
POTASSIUM SERPL-MCNC: 4 MMOL/L — SIGNIFICANT CHANGE UP (ref 3.5–5.3)
POTASSIUM SERPL-SCNC: 4 MMOL/L — SIGNIFICANT CHANGE UP (ref 3.5–5.3)
PROT SERPL-MCNC: 5.9 G/DL — LOW (ref 6–8.3)
RBC # BLD: 3.18 M/UL — LOW (ref 3.8–5.2)
RBC # FLD: 14.5 % — SIGNIFICANT CHANGE UP (ref 10.3–14.5)
SODIUM SERPL-SCNC: 134 MMOL/L — LOW (ref 135–145)
WBC # BLD: 12.25 K/UL — HIGH (ref 3.8–10.5)
WBC # FLD AUTO: 12.25 K/UL — HIGH (ref 3.8–10.5)

## 2024-12-29 PROCEDURE — 99232 SBSQ HOSP IP/OBS MODERATE 35: CPT

## 2024-12-29 RX ORDER — SOD PHOS DI, MONO/K PHOS MONO 250 MG
1 TABLET ORAL ONCE
Refills: 0 | Status: COMPLETED | OUTPATIENT
Start: 2024-12-29 | End: 2024-12-29

## 2024-12-29 RX ORDER — ACETAMINOPHEN 80 MG/.8ML
650 SOLUTION/ DROPS ORAL EVERY 8 HOURS
Refills: 0 | Status: COMPLETED | OUTPATIENT
Start: 2024-12-29 | End: 2024-12-31

## 2024-12-29 RX ORDER — LIDOCAINE 50 MG/G
1 OINTMENT TOPICAL DAILY
Refills: 0 | Status: DISCONTINUED | OUTPATIENT
Start: 2024-12-29 | End: 2024-12-31

## 2024-12-29 RX ORDER — SODIUM CHLORIDE 9 MG/ML
500 INJECTION, SOLUTION INTRAVENOUS ONCE
Refills: 0 | Status: COMPLETED | OUTPATIENT
Start: 2024-12-29 | End: 2024-12-29

## 2024-12-29 RX ADMIN — SODIUM CHLORIDE 166.67 MILLILITER(S): 9 INJECTION, SOLUTION INTRAVENOUS at 09:18

## 2024-12-29 RX ADMIN — ACETAMINOPHEN 650 MILLIGRAM(S): 80 SOLUTION/ DROPS ORAL at 21:47

## 2024-12-29 RX ADMIN — HEPARIN SODIUM 5000 UNIT(S): 1000 INJECTION, SOLUTION INTRAVENOUS; SUBCUTANEOUS at 05:05

## 2024-12-29 RX ADMIN — LIDOCAINE 1 PATCH: 50 OINTMENT TOPICAL at 17:21

## 2024-12-29 RX ADMIN — ACETAMINOPHEN 650 MILLIGRAM(S): 80 SOLUTION/ DROPS ORAL at 14:30

## 2024-12-29 RX ADMIN — HEPARIN SODIUM 5000 UNIT(S): 1000 INJECTION, SOLUTION INTRAVENOUS; SUBCUTANEOUS at 17:22

## 2024-12-29 RX ADMIN — ACETAMINOPHEN 650 MILLIGRAM(S): 80 SOLUTION/ DROPS ORAL at 22:47

## 2024-12-29 RX ADMIN — ACETAMINOPHEN 650 MILLIGRAM(S): 80 SOLUTION/ DROPS ORAL at 13:37

## 2024-12-29 RX ADMIN — Medication 1 PACKET(S): at 09:40

## 2024-12-29 RX ADMIN — Medication 3 MILLIGRAM(S): at 21:46

## 2024-12-29 NOTE — PHYSICAL THERAPY INITIAL EVALUATION ADULT - PERTINENT HX OF CURRENT PROBLEM, REHAB EVAL
Pt is a 89 year old female who presents with acute pancreatitis and right lower extremity pain due to fall. X-rays with stable right femur ORIF hardware, no fracture, partially imaged spondylosis

## 2024-12-29 NOTE — PHYSICAL THERAPY INITIAL EVALUATION ADULT - NSPTDISCHREC_GEN_A_CORE
Home with home PT and resumption of services to address current functional limitations to optimize safety within the home environment./Home PT

## 2024-12-29 NOTE — PHYSICAL THERAPY INITIAL EVALUATION ADULT - ADDITIONAL COMMENTS
Pt lives alone in an apartment, +elevator. prior to admission Pt stated she ambulated independently and used a rolling walker. Pt required assistance with ADLs from her home health aide. Pt stated she has a home health aide from 9-2 daily.     Pt. left comfortable in bed with all tubes/lines intact, head of the bed elevated, +bed alarm, call bell in reach and in NAD. RNFawad, aware of session and pt current position.

## 2024-12-29 NOTE — PROGRESS NOTE ADULT - PROBLEM SELECTOR PLAN 5
DVT ppx: HSQ  Diet: regular  Dispo: Home with Home PT per PT eval   Code: DNR/DNI (molst done on 12/28)    Update son Geo on phone 12/29

## 2024-12-30 ENCOUNTER — TRANSCRIPTION ENCOUNTER (OUTPATIENT)
Age: 88
End: 2024-12-30

## 2024-12-30 LAB
ALBUMIN SERPL ELPH-MCNC: 3 G/DL — LOW (ref 3.3–5)
ALP SERPL-CCNC: 62 U/L — SIGNIFICANT CHANGE UP (ref 40–120)
ALT FLD-CCNC: 14 U/L — SIGNIFICANT CHANGE UP (ref 4–33)
ANION GAP SERPL CALC-SCNC: 14 MMOL/L — SIGNIFICANT CHANGE UP (ref 7–14)
AST SERPL-CCNC: 22 U/L — SIGNIFICANT CHANGE UP (ref 4–32)
BASOPHILS # BLD AUTO: 0.03 K/UL — SIGNIFICANT CHANGE UP (ref 0–0.2)
BASOPHILS NFR BLD AUTO: 0.3 % — SIGNIFICANT CHANGE UP (ref 0–2)
BILIRUB DIRECT SERPL-MCNC: <0.2 MG/DL — SIGNIFICANT CHANGE UP (ref 0–0.3)
BILIRUB INDIRECT FLD-MCNC: >0.5 MG/DL — SIGNIFICANT CHANGE UP (ref 0–1)
BILIRUB SERPL-MCNC: 0.7 MG/DL — SIGNIFICANT CHANGE UP (ref 0.2–1.2)
BUN SERPL-MCNC: 15 MG/DL — SIGNIFICANT CHANGE UP (ref 7–23)
CALCIUM SERPL-MCNC: 8.8 MG/DL — SIGNIFICANT CHANGE UP (ref 8.4–10.5)
CHLORIDE SERPL-SCNC: 104 MMOL/L — SIGNIFICANT CHANGE UP (ref 98–107)
CO2 SERPL-SCNC: 20 MMOL/L — LOW (ref 22–31)
CREAT SERPL-MCNC: 0.87 MG/DL — SIGNIFICANT CHANGE UP (ref 0.5–1.3)
EGFR: 58 ML/MIN/1.73M2 — LOW
EOSINOPHIL # BLD AUTO: 0.26 K/UL — SIGNIFICANT CHANGE UP (ref 0–0.5)
EOSINOPHIL NFR BLD AUTO: 2.3 % — SIGNIFICANT CHANGE UP (ref 0–6)
GLUCOSE SERPL-MCNC: 89 MG/DL — SIGNIFICANT CHANGE UP (ref 70–99)
HCT VFR BLD CALC: 30.3 % — LOW (ref 34.5–45)
HGB BLD-MCNC: 10.1 G/DL — LOW (ref 11.5–15.5)
IANC: 8.7 K/UL — HIGH (ref 1.8–7.4)
IMM GRANULOCYTES NFR BLD AUTO: 1 % — HIGH (ref 0–0.9)
LYMPHOCYTES # BLD AUTO: 1.41 K/UL — SIGNIFICANT CHANGE UP (ref 1–3.3)
LYMPHOCYTES # BLD AUTO: 12.6 % — LOW (ref 13–44)
MAGNESIUM SERPL-MCNC: 1.9 MG/DL — SIGNIFICANT CHANGE UP (ref 1.6–2.6)
MCHC RBC-ENTMCNC: 30.3 PG — SIGNIFICANT CHANGE UP (ref 27–34)
MCHC RBC-ENTMCNC: 33.3 G/DL — SIGNIFICANT CHANGE UP (ref 32–36)
MCV RBC AUTO: 91 FL — SIGNIFICANT CHANGE UP (ref 80–100)
MONOCYTES # BLD AUTO: 0.69 K/UL — SIGNIFICANT CHANGE UP (ref 0–0.9)
MONOCYTES NFR BLD AUTO: 6.2 % — SIGNIFICANT CHANGE UP (ref 2–14)
NEUTROPHILS # BLD AUTO: 8.7 K/UL — HIGH (ref 1.8–7.4)
NEUTROPHILS NFR BLD AUTO: 77.6 % — HIGH (ref 43–77)
NRBC # BLD: 0 /100 WBCS — SIGNIFICANT CHANGE UP (ref 0–0)
NRBC # FLD: 0 K/UL — SIGNIFICANT CHANGE UP (ref 0–0)
PHOSPHATE SERPL-MCNC: 2.2 MG/DL — LOW (ref 2.5–4.5)
PLATELET # BLD AUTO: 151 K/UL — SIGNIFICANT CHANGE UP (ref 150–400)
POTASSIUM SERPL-MCNC: 4 MMOL/L — SIGNIFICANT CHANGE UP (ref 3.5–5.3)
POTASSIUM SERPL-SCNC: 4 MMOL/L — SIGNIFICANT CHANGE UP (ref 3.5–5.3)
PROT SERPL-MCNC: 6.4 G/DL — SIGNIFICANT CHANGE UP (ref 6–8.3)
RBC # BLD: 3.33 M/UL — LOW (ref 3.8–5.2)
RBC # FLD: 14.6 % — HIGH (ref 10.3–14.5)
SODIUM SERPL-SCNC: 138 MMOL/L — SIGNIFICANT CHANGE UP (ref 135–145)
WBC # BLD: 11.2 K/UL — HIGH (ref 3.8–10.5)
WBC # FLD AUTO: 11.2 K/UL — HIGH (ref 3.8–10.5)

## 2024-12-30 PROCEDURE — 99232 SBSQ HOSP IP/OBS MODERATE 35: CPT

## 2024-12-30 RX ORDER — SOD PHOS DI, MONO/K PHOS MONO 250 MG
1 TABLET ORAL ONCE
Refills: 0 | Status: COMPLETED | OUTPATIENT
Start: 2024-12-30 | End: 2024-12-30

## 2024-12-30 RX ADMIN — ACETAMINOPHEN 650 MILLIGRAM(S): 80 SOLUTION/ DROPS ORAL at 13:47

## 2024-12-30 RX ADMIN — LIDOCAINE 1 PATCH: 50 OINTMENT TOPICAL at 20:23

## 2024-12-30 RX ADMIN — Medication 25 MILLIGRAM(S): at 06:06

## 2024-12-30 RX ADMIN — ACETAMINOPHEN 650 MILLIGRAM(S): 80 SOLUTION/ DROPS ORAL at 06:05

## 2024-12-30 RX ADMIN — LIDOCAINE 1 PATCH: 50 OINTMENT TOPICAL at 06:01

## 2024-12-30 RX ADMIN — LIDOCAINE 1 PATCH: 50 OINTMENT TOPICAL at 13:48

## 2024-12-30 RX ADMIN — HEPARIN SODIUM 5000 UNIT(S): 1000 INJECTION, SOLUTION INTRAVENOUS; SUBCUTANEOUS at 06:06

## 2024-12-30 RX ADMIN — ACETAMINOPHEN 650 MILLIGRAM(S): 80 SOLUTION/ DROPS ORAL at 14:40

## 2024-12-30 RX ADMIN — Medication 1 PACKET(S): at 13:48

## 2024-12-30 RX ADMIN — ACETAMINOPHEN 650 MILLIGRAM(S): 80 SOLUTION/ DROPS ORAL at 06:59

## 2024-12-30 RX ADMIN — ACETAMINOPHEN 650 MILLIGRAM(S): 80 SOLUTION/ DROPS ORAL at 21:24

## 2024-12-30 RX ADMIN — HEPARIN SODIUM 5000 UNIT(S): 1000 INJECTION, SOLUTION INTRAVENOUS; SUBCUTANEOUS at 17:38

## 2024-12-30 NOTE — DISCHARGE NOTE PROVIDER - NSDCFUSCHEDAPPT_GEN_ALL_CORE_FT
Davis Marcelino  [facility]  VASCULAR [address]  Scheduled Appointment: 01/07/2025    Kale Khan  [facility]  CARDIOLOGY [address] [facility]   Scheduled Appointment: 01/08/2025

## 2024-12-30 NOTE — DISCHARGE NOTE PROVIDER - DISCHARGE DATE
POST SEPSIS ALERT HUDDLE  Time Huddle Called Overhead: 1637    Responding Provider: Dr. Becki SMALLS RN: Mandi    SEVERE SEPSIS CRITERIA  Infection Source: PNA    SIRS: HR (92) RR (25) WBC (12.9)    Organ Dysfunction: LA    Time Huddle Completed:     Were 2 sets of blood cultures drawn prior to ABX: yes    Time of Blood Culture # 1: 1545  Time of Blood Culture # 2: 1552    IV Antibiotic Given: rocephin  Time IV Antibiotic Given: 1830    Initial Lactate: 2.7  Draw/Result Time: 1504/1633    Repeat Lactate: 1.2  Draw/Result Time: 1816/1849    SEPTIC SHOCK CRITERIA  Does the patient have initial hypotension: no  First Low BP (MAP)/Time: n/a  Second Low BP (MAP)/Time: n/a    Actual Weight:    BMI:    IBW (may only use if BMI > 30):     IBW in kg charted in physician note along with reason for using (BMI > 30 or obesity):     30 mL/kg:     Volume of Fluid Completed:     Time Fluids Completed:    Documentation by Physician of fluid exclusion criteria used:    Documentation by Physician of amount of fluids given with fluid exclusion criteria (MUST match orders in the MAR):     Last 2 Consecutive BP/MAP within the hour after fluids completed:    1.   2.     Vasopressor Start Time (must be started within 6 hours of shock presentation time):     Why Vasopressor not started:     Time of Sepsis Reassessment Completed by Physician (must be within 6 hours of shock):            31-Dec-2024

## 2024-12-30 NOTE — DISCHARGE NOTE NURSING/CASE MANAGEMENT/SOCIAL WORK - FINANCIAL ASSISTANCE
[facility] provides services at a reduced cost to those who are determined to be eligible through [facility]’s financial assistance program. Information regarding [facility]’s financial assistance program can be found by going to [email or web address] or by calling [phone].

## 2024-12-30 NOTE — DISCHARGE NOTE NURSING/CASE MANAGEMENT/SOCIAL WORK - NSDCPEPT PROEDHF_GEN_ALL_CORE
Call primary care provider for follow up after discharge/Activities as tolerated/Low salt diet/Monitor weight daily/Report signs and symptoms to primary care provider
Farzana Carney - /Health Care Proxy (HCP)
Zygomaticofacial Flap Text: Because of full-thickness of the defect and to avoid distortion of the lower eyelid, a full cheek rotation flap was planned. After prep and anesthesia, a large Burow’s triangle was excised inferiorly.  An incision was made from the superior portion of the wound over the orbital rim, curving upward onto the temple, then downward along the preauricular crease and below the ear lobule where another Burow’s triangle was excised.  The full cheek flap was elevated and the wound edges were undermined in the subcutaneous plane. After hemostasis, the flap was rotated and carried over into place, trimmed at the tip, suspended with a periosteal stitch from the orbital rim, and sutured in a a layered fashion.

## 2024-12-30 NOTE — DISCHARGE NOTE NURSING/CASE MANAGEMENT/SOCIAL WORK - PATIENT PORTAL LINK FT
You can access the FollowMyHealth Patient Portal offered by [facility] by registering at the following website: [email or web address]. By joining [employer]’s FollowMyHealth portal, you will also be able to view your health information using other applications (apps) compatible with our system.

## 2024-12-30 NOTE — PROGRESS NOTE ADULT - PROBLEM SELECTOR PLAN 5
DVT ppx: HSQ  Diet: regular  Dispo: Home with Home PT per PT eval   Code: DNR/DNI (molst done on 12/28)

## 2024-12-30 NOTE — DISCHARGE NOTE PROVIDER - NSDCCPCAREPLAN_GEN_ALL_CORE_FT
PRINCIPAL DISCHARGE DIAGNOSIS  Diagnosis: Pancreatitis  Assessment and Plan of Treatment: Follow up with your gastroenterologist DR Villalobos next week  advance diet as tolerated      SECONDARY DISCHARGE DIAGNOSES  Diagnosis: Right leg pain  Assessment and Plan of Treatment: You may take tylenol for pain  You wiull receive home physical therapy    Diagnosis: HTN (hypertension)  Assessment and Plan of Treatment: Low salt, low fat, low cholesterol   Continue metoprolol    Diagnosis: Chronic diastolic heart failure  Assessment and Plan of Treatment: Continue diuretic ethacrynic acid     PRINCIPAL DISCHARGE DIAGNOSIS  Diagnosis: Pancreatitis  Assessment and Plan of Treatment: Follow up with your gastroenterologist Dr Villalobos next week  advance diet as tolerated      SECONDARY DISCHARGE DIAGNOSES  Diagnosis: Right leg pain  Assessment and Plan of Treatment: You may take tylenol for pain  You wiull receive home physical therapy    Diagnosis: HTN (hypertension)  Assessment and Plan of Treatment: Low salt, low fat, low cholesterol   Continue metoprolol    Diagnosis: Chronic diastolic heart failure  Assessment and Plan of Treatment: HOld diuretic ethacrynic acid until you follow up with your cardiologist Dr Khan next week

## 2024-12-30 NOTE — DISCHARGE NOTE NURSING/CASE MANAGEMENT/SOCIAL WORK - NSDCPNINST_GEN_ALL_CORE
Call MD with any further signs of persistent abdominal pain,  nausea and/or vomiting, unrelieved abdominal discomfort as well as with signs of infection ie fever/shaking chills.   Continue safety and fall prevention measures at home to prevent injury.  Follow-up with your provider as instructed for continuity of care.

## 2024-12-30 NOTE — DISCHARGE NOTE PROVIDER - NSDCMRMEDTOKEN_GEN_ALL_CORE_FT
ethacrynic acid 25 mg oral tablet: 1 tab(s) orally once a day  Metoprolol Succinate ER 25 mg oral tablet, extended release: 1 tab(s) orally once a day   Metoprolol Succinate ER 25 mg oral tablet, extended release: 1 tab(s) orally once a day

## 2024-12-30 NOTE — DISCHARGE NOTE NURSING/CASE MANAGEMENT/SOCIAL WORK - NSDCPETBCESMAN_GEN_ALL_CORE
Pt comes in with complaints of a metal fragment in his L eye. Pt believes his last Tetanus was about 3 years ago in [place].   If you are a smoker, it is important for your health to stop smoking. Please be aware that second hand smoke is also harmful. 27-Nov-2024

## 2024-12-30 NOTE — DISCHARGE NOTE PROVIDER - HOSPITAL COURSE
89F with HTN, HFpEF, macular degeneration, pancreatitis p/w acute pancreatitis and RLE pain 2/2 fall.     Acute Pancreatitis   - s/p gentle IVF   - increase diet as tolerated  - pain control prn  - pt follows closely with outpatient GI, would recommend follow up upon d/c.      RLE pain s/p fall   - X-rays with stable R femur ORIF hardware, no fracture, partially imaged spondylosis    - Home PT set up and HHS reinstaetd      HTN (hypertension). ·  Plan: - resume metoprolol  - trend BP.    Chronic diastolic heart failure. ·  Plan: Hold diuretics in setting of pancreatitis and mild dehydration  - assess fluid status daily.      Plan for DC 12/31 as per Dr Enamorado

## 2024-12-30 NOTE — DISCHARGE NOTE NURSING/CASE MANAGEMENT/SOCIAL WORK - NSDCPECAREGIVERED_GEN_ALL_CORE
Medline and carenotes for Pancreatitis, Dietary recommendations, Fall prevention in the home,  as well as DC Medications and side effects literature for patient reference.

## 2024-12-31 VITALS
RESPIRATION RATE: 17 BRPM | DIASTOLIC BLOOD PRESSURE: 58 MMHG | SYSTOLIC BLOOD PRESSURE: 132 MMHG | HEART RATE: 95 BPM | OXYGEN SATURATION: 95 % | TEMPERATURE: 98 F

## 2024-12-31 LAB
ALBUMIN SERPL ELPH-MCNC: 3 G/DL — LOW (ref 3.3–5)
ALP SERPL-CCNC: 57 U/L — SIGNIFICANT CHANGE UP (ref 40–120)
ALT FLD-CCNC: 12 U/L — SIGNIFICANT CHANGE UP (ref 4–33)
ANION GAP SERPL CALC-SCNC: 10 MMOL/L — SIGNIFICANT CHANGE UP (ref 7–14)
AST SERPL-CCNC: 15 U/L — SIGNIFICANT CHANGE UP (ref 4–32)
BASOPHILS # BLD AUTO: 0.03 K/UL — SIGNIFICANT CHANGE UP (ref 0–0.2)
BASOPHILS NFR BLD AUTO: 0.4 % — SIGNIFICANT CHANGE UP (ref 0–2)
BILIRUB SERPL-MCNC: 0.5 MG/DL — SIGNIFICANT CHANGE UP (ref 0.2–1.2)
BUN SERPL-MCNC: 14 MG/DL — SIGNIFICANT CHANGE UP (ref 7–23)
CALCIUM SERPL-MCNC: 8.8 MG/DL — SIGNIFICANT CHANGE UP (ref 8.4–10.5)
CHLORIDE SERPL-SCNC: 104 MMOL/L — SIGNIFICANT CHANGE UP (ref 98–107)
CO2 SERPL-SCNC: 22 MMOL/L — SIGNIFICANT CHANGE UP (ref 22–31)
CREAT SERPL-MCNC: 0.85 MG/DL — SIGNIFICANT CHANGE UP (ref 0.5–1.3)
EGFR: 60 ML/MIN/1.73M2 — SIGNIFICANT CHANGE UP
EOSINOPHIL # BLD AUTO: 0.28 K/UL — SIGNIFICANT CHANGE UP (ref 0–0.5)
EOSINOPHIL NFR BLD AUTO: 3.4 % — SIGNIFICANT CHANGE UP (ref 0–6)
GLUCOSE SERPL-MCNC: 95 MG/DL — SIGNIFICANT CHANGE UP (ref 70–99)
HCT VFR BLD CALC: 29.5 % — LOW (ref 34.5–45)
HGB BLD-MCNC: 9.9 G/DL — LOW (ref 11.5–15.5)
IANC: 6.37 K/UL — SIGNIFICANT CHANGE UP (ref 1.8–7.4)
IMM GRANULOCYTES NFR BLD AUTO: 0.7 % — SIGNIFICANT CHANGE UP (ref 0–0.9)
LYMPHOCYTES # BLD AUTO: 0.98 K/UL — LOW (ref 1–3.3)
LYMPHOCYTES # BLD AUTO: 12 % — LOW (ref 13–44)
MCHC RBC-ENTMCNC: 30.8 PG — SIGNIFICANT CHANGE UP (ref 27–34)
MCHC RBC-ENTMCNC: 33.6 G/DL — SIGNIFICANT CHANGE UP (ref 32–36)
MCV RBC AUTO: 91.9 FL — SIGNIFICANT CHANGE UP (ref 80–100)
MONOCYTES # BLD AUTO: 0.45 K/UL — SIGNIFICANT CHANGE UP (ref 0–0.9)
MONOCYTES NFR BLD AUTO: 5.5 % — SIGNIFICANT CHANGE UP (ref 2–14)
NEUTROPHILS # BLD AUTO: 6.37 K/UL — SIGNIFICANT CHANGE UP (ref 1.8–7.4)
NEUTROPHILS NFR BLD AUTO: 78 % — HIGH (ref 43–77)
NRBC # BLD: 0 /100 WBCS — SIGNIFICANT CHANGE UP (ref 0–0)
NRBC # FLD: 0 K/UL — SIGNIFICANT CHANGE UP (ref 0–0)
PLATELET # BLD AUTO: 132 K/UL — LOW (ref 150–400)
POTASSIUM SERPL-MCNC: 4.1 MMOL/L — SIGNIFICANT CHANGE UP (ref 3.5–5.3)
POTASSIUM SERPL-SCNC: 4.1 MMOL/L — SIGNIFICANT CHANGE UP (ref 3.5–5.3)
PROT SERPL-MCNC: 6.1 G/DL — SIGNIFICANT CHANGE UP (ref 6–8.3)
RBC # BLD: 3.21 M/UL — LOW (ref 3.8–5.2)
RBC # FLD: 14.5 % — SIGNIFICANT CHANGE UP (ref 10.3–14.5)
SODIUM SERPL-SCNC: 136 MMOL/L — SIGNIFICANT CHANGE UP (ref 135–145)
WBC # BLD: 8.17 K/UL — SIGNIFICANT CHANGE UP (ref 3.8–10.5)
WBC # FLD AUTO: 8.17 K/UL — SIGNIFICANT CHANGE UP (ref 3.8–10.5)

## 2024-12-31 PROCEDURE — 99239 HOSP IP/OBS DSCHRG MGMT >30: CPT

## 2024-12-31 RX ORDER — ACETAMINOPHEN 80 MG/.8ML
650 SOLUTION/ DROPS ORAL ONCE
Refills: 0 | Status: COMPLETED | OUTPATIENT
Start: 2024-12-31 | End: 2024-12-31

## 2024-12-31 RX ADMIN — HEPARIN SODIUM 5000 UNIT(S): 1000 INJECTION, SOLUTION INTRAVENOUS; SUBCUTANEOUS at 05:15

## 2024-12-31 RX ADMIN — ACETAMINOPHEN 650 MILLIGRAM(S): 80 SOLUTION/ DROPS ORAL at 05:14

## 2024-12-31 RX ADMIN — LIDOCAINE 1 PATCH: 50 OINTMENT TOPICAL at 00:12

## 2024-12-31 RX ADMIN — Medication 25 MILLIGRAM(S): at 05:13

## 2024-12-31 RX ADMIN — ACETAMINOPHEN 650 MILLIGRAM(S): 80 SOLUTION/ DROPS ORAL at 10:55

## 2024-12-31 RX ADMIN — ACETAMINOPHEN 650 MILLIGRAM(S): 80 SOLUTION/ DROPS ORAL at 11:50

## 2024-12-31 NOTE — PROGRESS NOTE ADULT - PROBLEM SELECTOR PLAN 4
Hold diuretics in setting of pancreatitis and mild dehydration  - assess fluid status daily

## 2024-12-31 NOTE — PROGRESS NOTE ADULT - PROBLEM SELECTOR PLAN 5
DVT ppx: HSQ  Diet: regular  Dispo: Home with Home PT per PT eval   Code: DNR/DNI (molst done on 12/28)   will dc home today  Patient hemodynamically stable for discharge home  Time spent in discharge process is 31 min

## 2024-12-31 NOTE — PROGRESS NOTE ADULT - PROBLEM SELECTOR PLAN 2
Pt with RLE pain s/p fall. Mechanical Fall in nature.   - PT consulted  - R Xray hip, femur and knee on 12/28 notable for: "Demineralized bones. Patient is status post right femur ORIF with intact hardware. Heterotopic ossification about the inferior hip articulation. No fracture. No dislocation. Joint spaces are maintained. Partially imaged lumbar spondylosis. Vascular calcification is present. "   - pain control with standing tylenol 650mg tid with lidocaine patch to the R hip  - orthostatic + for diastolic and HR change, small IVF bolus ordered. Recheck orthostatics. No sx of dizziness/lightheadedness   - hold loop diuretic for now
Pt with RLE pain s/p fall  - PT consulted  - R Xray hip, femur and knee on 12/28 notable for: "Demineralized bones. Patient is status post right femur ORIF with intact hardware. Heterotopic ossification about the inferior hip articulation. No fracture. No dislocation. Joint spaces are maintained. Partially imaged lumbar spondylosis. Vascular calcification is present. "   - pain control prn with tylenol for now.  - check orthostatics  - hold loop diuretic for now pending orthostatics
Pt with RLE pain s/p fall. Mechanical Fall in nature.   - PT consulted  - R Xray hip, femur and knee on 12/28 notable for: "Demineralized bones. Patient is status post right femur ORIF with intact hardware. Heterotopic ossification about the inferior hip articulation. No fracture. No dislocation. Joint spaces are maintained. Partially imaged lumbar spondylosis. Vascular calcification is present. "   - pain control with standing tylenol 650mg tid with lidocaine patch to the R hip  - orthostatic + for diastolic and HR change, small IVF bolus ordered. Recheck orthostatics. No sx of dizziness/lightheadedness   - hold loop diuretic for now
Pt with RLE pain s/p fall. Mechanical Fall in nature.   - PT consulted  - R Xray hip, femur and knee on 12/28 notable for: "Demineralized bones. Patient is status post right femur ORIF with intact hardware. Heterotopic ossification about the inferior hip articulation. No fracture. No dislocation. Joint spaces are maintained. Partially imaged lumbar spondylosis. Vascular calcification is present. "   - pain control with standing tylenol 650mg tid with lidocaine patch to the R hip  - orthostatic + for diastolic and HR change, small IVF bolus ordered. Recheck orthostatics. No sx of dizziness/lightheadedness   - hold loop diuretic for now

## 2024-12-31 NOTE — PROGRESS NOTE ADULT - SUBJECTIVE AND OBJECTIVE BOX
Patient is a 89y old  Female who presents with a chief complaint of Fall, pancreatitis (30 Dec 2024 18:50)      SUBJECTIVE / OVERNIGHT EVENTS: patient seen and examined by bedside, pt comfortable, no acute distress noted        MEDICATIONS  (STANDING):  heparin   Injectable 5000 Unit(s) SubCutaneous every 12 hours  lidocaine   4% Patch 1 Patch Transdermal daily  metoprolol succinate ER 25 milliGRAM(s) Oral daily    MEDICATIONS  (PRN):  aluminum hydroxide/magnesium hydroxide/simethicone Suspension 30 milliLiter(s) Oral every 4 hours PRN Dyspepsia  artificial tears (preservative free) Ophthalmic Solution 1 Drop(s) Both EYES three times a day PRN Dry Eyes  melatonin 3 milliGRAM(s) Oral at bedtime PRN Insomnia  ondansetron Injectable 4 milliGRAM(s) IV Push every 8 hours PRN Nausea and/or Vomiting      Vital Signs Last 24 Hrs  T(C): 36.7 (31 Dec 2024 09:07), Max: 36.7 (31 Dec 2024 09:07)  T(F): 98.1 (31 Dec 2024 09:07), Max: 98.1 (31 Dec 2024 09:07)  HR: 95 (31 Dec 2024 09:07) (87 - 97)  BP: 132/58 (31 Dec 2024 09:07) (129/49 - 138/51)  BP(mean): --  RR: 17 (31 Dec 2024 09:07) (17 - 19)  SpO2: 95% (31 Dec 2024 09:07) (94% - 97%)    Parameters below as of 31 Dec 2024 05:00  Patient On (Oxygen Delivery Method): room air      CAPILLARY BLOOD GLUCOSE        I&O's Summary    30 Dec 2024 07:01  -  31 Dec 2024 07:00  --------------------------------------------------------  IN: 480 mL / OUT: 800 mL / NET: -320 mL    31 Dec 2024 07:01  -  31 Dec 2024 14:25  --------------------------------------------------------  IN: 0 mL / OUT: 250 mL / NET: -250 mL        PHYSICAL EXAM:  CONSTITUTIONAL: NAD, elderly   EYES: conjunctiva and sclera clear  ENMT: Moist oral mucosa   NECK: Supple   RESPIRATORY: Normal respiratory effort; lungs are clear to auscultation bilaterally  CARDIOVASCULAR: Regular rate and rhythm, normal S1 and S2, no murmur/rub/gallop; Peripheral pulses are 2+ bilaterally  ABDOMEN: Nontender to palpation, normoactive bowel sounds, no rebound/guarding   MUSCULOSKELETAL: No lower extremity edema   PSYCH:affect appropriate  NEUROLOGY: moves all extremities   SKIN: No rashes      LABS:                        9.9    8.17  )-----------( 132      ( 31 Dec 2024 08:34 )             29.5     12-31    136  |  104  |  14  ----------------------------<  95  4.1   |  22  |  0.85    Ca    8.8      31 Dec 2024 08:34  Phos  2.2     12-30  Mg     1.90     12-30    TPro  6.1  /  Alb  3.0[L]  /  TBili  0.5  /  DBili  x   /  AST  15  /  ALT  12  /  AlkPhos  57  12-31          Urinalysis Basic - ( 31 Dec 2024 08:34 )    Color: x / Appearance: x / SG: x / pH: x  Gluc: 95 mg/dL / Ketone: x  / Bili: x / Urobili: x   Blood: x / Protein: x / Nitrite: x   Leuk Esterase: x / RBC: x / WBC x   Sq Epi: x / Non Sq Epi: x / Bacteria: x        RADIOLOGY & ADDITIONAL TESTS:    Imaging Personally Reviewed:    Consultant(s) Notes Reviewed:      Care Discussed with Consultants/Other Providers:  
[facility] of [facility] Medicine  Sean Patiño DO  Available on MS Teams  Pager: [phone]    Patient is a 89y old  Female who presents with a chief complaint of Fall, pancreatitis (27 Dec 2024 12:20)    OVERNIGHT EVENTS: No acute events overnight    SUBJECTIVE: Pt seen and examined at bedside this morning. Doing well, came back from xrays, reporting no abd pain, n/v/d/c, eating without pain. Did not want IVF. WBC still in 12s. Lipase improving to 270s. AAox4. pending orthostatics, resumed home toprol.     ADDITIONAL REVIEW OF SYSTEMS:    MEDICATIONS  (STANDING):  heparin   Injectable 5000 Unit(s) SubCutaneous every 12 hours  metoprolol succinate ER 25 milliGRAM(s) Oral daily    MEDICATIONS  (PRN):  acetaminophen     Tablet .. 650 milliGRAM(s) Oral every 6 hours PRN Temp greater or equal to 38C (100.4F), Mild Pain (1 - 3)  aluminum hydroxide/magnesium hydroxide/simethicone Suspension 30 milliLiter(s) Oral every 4 hours PRN Dyspepsia  artificial tears (preservative free) Ophthalmic Solution 1 Drop(s) Both EYES three times a day PRN Dry Eyes  melatonin 3 milliGRAM(s) Oral at bedtime PRN Insomnia  ondansetron Injectable 4 milliGRAM(s) IV Push every 8 hours PRN Nausea and/or Vomiting      CAPILLARY BLOOD GLUCOSE        I&O's Summary    27 Dec 2024 07:01  -  28 Dec 2024 07:00  --------------------------------------------------------  IN: 150 mL / OUT: 200 mL / NET: -50 mL        PHYSICAL EXAM:  Vital Signs Last 24 Hrs  T(C): 36.5 (28 Dec 2024 09:32), Max: 36.9 (28 Dec 2024 05:47)  T(F): 97.7 (28 Dec 2024 09:32), Max: 98.4 (28 Dec 2024 05:47)  HR: 82 (28 Dec 2024 09:32) (76 - 94)  BP: 130/47 (28 Dec 2024 09:32) (111/46 - 130/47)  BP(mean): --  RR: 18 (28 Dec 2024 09:32) (17 - 18)  SpO2: 94% (28 Dec 2024 09:32) (93% - 94%)    Parameters below as of 28 Dec 2024 09:32  Patient On (Oxygen Delivery Method): room air        CONSTITUTIONAL: NAD, frail, elderly female, on RA   HEAD: Normocephalic, atraumatic  EYES: EOMI, PERRL, MMM, conjunctiva clear, anicteric   ENT: no rhinorrhea, no pharyngeal erythema  RESPIRATORY: No increased work of breathing, CTAB, no wheezes or crackles appreciated  CARDIOVASCULAR: RRR, S1 and S2 present, no m/r/g  ABDOMEN: soft, mild pain on deep palpation in middle of the abdomen, ND, bowel sounds present   EXTREMITIES: No LE edema b/l   MUSCULOSKELETAL: no joint swelling, no tenderness to palpation  NEURO: A&Ox4, moving all extremities independently     LABS:                        10.3   12.87 )-----------( 124      ( 28 Dec 2024 06:00 )             30.9     12-28    137  |  103  |  24[H]  ----------------------------<  82  4.0   |  22  |  0.94    Ca    8.8      28 Dec 2024 06:00  Phos  2.2     12-28  Mg     2.00     12-28    TPro  6.5  /  Alb  3.4  /  TBili  0.9  /  DBili  x   /  AST  23  /  ALT  10  /  AlkPhos  52  12-28    PT/INR - ( 26 Dec 2024 23:22 )   PT: 12.0 sec;   INR: 1.03 ratio         PTT - ( 26 Dec 2024 23:22 )  PTT:27.7 sec      Urinalysis Basic - ( 28 Dec 2024 06:00 )    Color: x / Appearance: x / SG: x / pH: x  Gluc: 82 mg/dL / Ketone: x  / Bili: x / Urobili: x   Blood: x / Protein: x / Nitrite: x   Leuk Esterase: x / RBC: x / WBC x   Sq Epi: x / Non Sq Epi: x / Bacteria: x          RADIOLOGY & ADDITIONAL TESTS:    Results Reviewed:   Imaging Personally Reviewed:  Electrocardiogram Personally Reviewed:    COORDINATION OF CARE:  Care Discussed with Consultants/Other Providers [Y/N]:  Prior or Outpatient Records Reviewed [Y/N]:            
[facility] Department of [facility] Medicine  Sean Patiño DO  Available on MS Teams  Pager: [phone]    Patient is a 89y old  Female who presents with a chief complaint of Fall, pancreatitis (28 Dec 2024 17:40)      OVERNIGHT EVENTS: No acute events overnight    SUBJECTIVE: Pt seen and examined at bedside this morning. Doing well, tolerating diet, no abd pain reported. Has some R hip pain. Added standing tylenol with lidocaine patch to the area as well. PT eval noted. Orthostatic + for diastolic dysfunction / HR and given small LR bolus over 3 hours. Holding loop diuretic for now.     ADDITIONAL REVIEW OF SYSTEMS:    MEDICATIONS  (STANDING):  acetaminophen     Tablet .. 650 milliGRAM(s) Oral every 8 hours  heparin   Injectable 5000 Unit(s) SubCutaneous every 12 hours  lidocaine   4% Patch 1 Patch Transdermal daily  metoprolol succinate ER 25 milliGRAM(s) Oral daily    MEDICATIONS  (PRN):  acetaminophen     Tablet .. 650 milliGRAM(s) Oral every 6 hours PRN Temp greater or equal to 38C (100.4F), Mild Pain (1 - 3)  aluminum hydroxide/magnesium hydroxide/simethicone Suspension 30 milliLiter(s) Oral every 4 hours PRN Dyspepsia  artificial tears (preservative free) Ophthalmic Solution 1 Drop(s) Both EYES three times a day PRN Dry Eyes  melatonin 3 milliGRAM(s) Oral at bedtime PRN Insomnia  ondansetron Injectable 4 milliGRAM(s) IV Push every 8 hours PRN Nausea and/or Vomiting      CAPILLARY BLOOD GLUCOSE        I&O's Summary    28 Dec 2024 07:01  -  29 Dec 2024 07:00  --------------------------------------------------------  IN: 0 mL / OUT: 400 mL / NET: -400 mL        PHYSICAL EXAM:  Vital Signs Last 24 Hrs  T(C): 36.8 (29 Dec 2024 09:18), Max: 37.2 (28 Dec 2024 17:39)  T(F): 98.2 (29 Dec 2024 09:18), Max: 98.9 (28 Dec 2024 17:39)  HR: 88 (29 Dec 2024 09:18) (83 - 92)  BP: 132/58 (29 Dec 2024 09:18) (127/50 - 136/78)  BP(mean): --  RR: 17 (29 Dec 2024 09:18) (17 - 17)  SpO2: 96% (29 Dec 2024 09:18) (93% - 96%)    Parameters below as of 29 Dec 2024 09:18  Patient On (Oxygen Delivery Method): room air      CONSTITUTIONAL: NAD, frail, elderly female, on RA   HEAD: Normocephalic, atraumatic  EYES: EOMI, PERRL, MMM, conjunctiva clear, anicteric   ENT: no rhinorrhea, no pharyngeal erythema  RESPIRATORY: No increased work of breathing, CTAB, no wheezes or crackles appreciated  CARDIOVASCULAR: RRR, S1 and S2 present, no m/r/g  ABDOMEN: soft, mild pain on deep palpation in middle of the abdomen, ND, bowel sounds present   EXTREMITIES: No LE edema b/l   MUSCULOSKELETAL: mild R hip tenderness with some skin bruising, not red/erythematous in nature.   NEURO: A&Ox4, moving all extremities independently     LABS:                        9.7    12.25 )-----------( 130      ( 29 Dec 2024 05:00 )             29.0     12-29    134[L]  |  101  |  19  ----------------------------<  79  4.0   |  19[L]  |  0.94    Ca    8.6      29 Dec 2024 05:00  Phos  2.2     12-29  Mg     1.90     12-29    TPro  5.9[L]  /  Alb  3.1[L]  /  TBili  0.9  /  DBili  <0.2  /  AST  21  /  ALT  11  /  AlkPhos  53  12-29          Urinalysis Basic - ( 29 Dec 2024 05:00 )    Color: x / Appearance: x / SG: x / pH: x  Gluc: 79 mg/dL / Ketone: x  / Bili: x / Urobili: x   Blood: x / Protein: x / Nitrite: x   Leuk Esterase: x / RBC: x / WBC x   Sq Epi: x / Non Sq Epi: x / Bacteria: x          RADIOLOGY & ADDITIONAL TESTS:    Results Reviewed:   Imaging Personally Reviewed:  Electrocardiogram Personally Reviewed:    COORDINATION OF CARE:  Care Discussed with Consultants/Other Providers [Y/N]:  Prior or Outpatient Records Reviewed [Y/N]:          
Danny Best MD  [facility] Division of Hospital Medicine  Pager: [phone]  Available via MS Teams    SUBJECTIVE / OVERNIGHT EVENTS:    no new complaints     MEDICATIONS  (STANDING):  acetaminophen     Tablet .. 650 milliGRAM(s) Oral every 8 hours  heparin   Injectable 5000 Unit(s) SubCutaneous every 12 hours  lidocaine   4% Patch 1 Patch Transdermal daily  metoprolol succinate ER 25 milliGRAM(s) Oral daily    MEDICATIONS  (PRN):  aluminum hydroxide/magnesium hydroxide/simethicone Suspension 30 milliLiter(s) Oral every 4 hours PRN Dyspepsia  artificial tears (preservative free) Ophthalmic Solution 1 Drop(s) Both EYES three times a day PRN Dry Eyes  melatonin 3 milliGRAM(s) Oral at bedtime PRN Insomnia  ondansetron Injectable 4 milliGRAM(s) IV Push every 8 hours PRN Nausea and/or Vomiting      I&O's Summary    29 Dec 2024 07:01  -  30 Dec 2024 07:00  --------------------------------------------------------  IN: 0 mL / OUT: 900 mL / NET: -900 mL    30 Dec 2024 07:01  -  30 Dec 2024 18:50  --------------------------------------------------------  IN: 0 mL / OUT: 200 mL / NET: -200 mL        PHYSICAL EXAM:  Vital Signs Last 24 Hrs  T(C): 36.6 (30 Dec 2024 18:32), Max: 37.1 (30 Dec 2024 02:14)  T(F): 97.8 (30 Dec 2024 18:32), Max: 98.7 (30 Dec 2024 02:14)  HR: 87 (30 Dec 2024 18:32) (80 - 104)  BP: 129/49 (30 Dec 2024 18:32) (115/69 - 139/80)  BP(mean): --  RR: 18 (30 Dec 2024 18:32) (17 - 18)  SpO2: 97% (30 Dec 2024 18:32) (93% - 97%)    Parameters below as of 30 Dec 2024 18:32  Patient On (Oxygen Delivery Method): room air      CONSTITUTIONAL: NAD, elderly   EYES: conjunctiva and sclera clear  ENMT: Moist oral mucosa   NECK: Supple   RESPIRATORY: Normal respiratory effort; lungs are clear to auscultation bilaterally  CARDIOVASCULAR: Regular rate and rhythm, normal S1 and S2, no murmur/rub/gallop; Peripheral pulses are 2+ bilaterally  ABDOMEN: Nontender to palpation, normoactive bowel sounds, no rebound/guarding   MUSCULOSKELETAL: No lower extremity edema   PSYCH:affect appropriate  NEUROLOGY: moves all extremities   SKIN: No rashes    LABS:                        10.1   11.20 )-----------( 151      ( 30 Dec 2024 05:29 )             30.3     12-30    138  |  104  |  15  ----------------------------<  89  4.0   |  20[L]  |  0.87    Ca    8.8      30 Dec 2024 08:11  Phos  2.2     12-30  Mg     1.90     12-30    TPro  6.4  /  Alb  3.0[L]  /  TBili  0.7  /  DBili  <0.2  /  AST  22  /  ALT  14  /  AlkPhos  62  12-30          Urinalysis Basic - ( 30 Dec 2024 08:11 )    Color: x / Appearance: x / SG: x / pH: x  Gluc: 89 mg/dL / Ketone: x  / Bili: x / Urobili: x   Blood: x / Protein: x / Nitrite: x   Leuk Esterase: x / RBC: x / WBC x   Sq Epi: x / Non Sq Epi: x / Bacteria: x            COORDINATION OF CARE:  Communication: discussed plan of care with ACP

## 2024-12-31 NOTE — PROGRESS NOTE ADULT - PROBLEM SELECTOR PLAN 3
- c/w metoprolol succinate   - trend BP

## 2024-12-31 NOTE — PROGRESS NOTE ADULT - PROBLEM SELECTOR PLAN 1
Pt with peripancreatic fluid and lipase >3000. Report of abd pain and vomiting previously, now resolved  - s/p IVF and now improved. Does not fluids. monitor. Tolerating Po as well diet as well. +passing gas.   - zofran prn for nausea   - pt follows closely with outpatient GI, would recommend follow up upon d/c
Pt with peripancreatic fluid and lipase >3000. Report of abd pain and vomiting previously, now resolved  - s/p IVF and now improved. Does not fluids. monitor. Tolerating Po as well.   - pain control prn with tylenol for pain   - zofran prn for nausea   - pt follows closely with outpatient GI, would recommend follow up upon d/c
Pt with peripancreatic fluid and lipase >3000. Report of abd pain and vomiting previously, now resolved  - s/p IVF and now improved. Does not fluids. monitor. Tolerating Po as well diet as well. +passing gas.   - zofran prn for nausea   - pt follows closely with outpatient GI, would recommend follow up upon d/c
Pt with peripancreatic fluid and lipase >3000. Report of abd pain and vomiting previously, now resolved  - s/p IVF and now improved. Does not fluids. monitor. Tolerating Po as well diet as well. +passing gas.   - zofran prn for nausea   - pt follows closely with outpatient GI, would recommend follow up upon d/c

## 2024-12-31 NOTE — PROGRESS NOTE ADULT - ASSESSMENT
89F with HTN, HFpEF, macular degeneration, pancreatitis p/w acute pancreatitis and RLE pain 2/2 fall. 

## 2025-01-06 ENCOUNTER — RESULT REVIEW (OUTPATIENT)
Age: 89
End: 2025-01-06

## 2025-01-07 ENCOUNTER — TRANSCRIPTION ENCOUNTER (OUTPATIENT)
Age: 89
End: 2025-01-07

## 2025-01-07 ENCOUNTER — APPOINTMENT (OUTPATIENT)
Dept: VASCULAR SURGERY | Facility: CLINIC | Age: 89
End: 2025-01-07

## 2025-01-07 ENCOUNTER — RESULT REVIEW (OUTPATIENT)
Age: 89
End: 2025-01-07

## 2025-01-08 ENCOUNTER — APPOINTMENT (OUTPATIENT)
Dept: CARDIOLOGY | Facility: CLINIC | Age: 89
End: 2025-01-08

## 2025-01-08 ENCOUNTER — TRANSCRIPTION ENCOUNTER (OUTPATIENT)
Age: 89
End: 2025-01-08

## 2025-01-10 ENCOUNTER — TRANSCRIPTION ENCOUNTER (OUTPATIENT)
Age: 89
End: 2025-01-10

## 2025-01-15 RX ORDER — METOPROLOL TARTRATE 50 MG
1 TABLET ORAL
Refills: 0 | DISCHARGE